# Patient Record
Sex: FEMALE | Race: WHITE | URBAN - METROPOLITAN AREA
[De-identification: names, ages, dates, MRNs, and addresses within clinical notes are randomized per-mention and may not be internally consistent; named-entity substitution may affect disease eponyms.]

---

## 2024-01-09 RX ORDER — SODIUM CHLORIDE, SODIUM LACTATE, POTASSIUM CHLORIDE, CALCIUM CHLORIDE 600; 310; 30; 20 MG/100ML; MG/100ML; MG/100ML; MG/100ML
INJECTION, SOLUTION INTRAVENOUS CONTINUOUS
Status: CANCELLED | OUTPATIENT
Start: 2024-01-09

## 2024-01-10 ENCOUNTER — ANESTHESIA (OUTPATIENT)
Dept: ENDOSCOPY | Facility: HOSPITAL | Age: 44
End: 2024-01-10
Payer: COMMERCIAL

## 2024-01-10 ENCOUNTER — ANESTHESIA EVENT (OUTPATIENT)
Dept: ENDOSCOPY | Facility: HOSPITAL | Age: 44
End: 2024-01-10
Payer: COMMERCIAL

## 2024-01-10 ENCOUNTER — HOSPITAL ENCOUNTER (OUTPATIENT)
Facility: HOSPITAL | Age: 44
Setting detail: HOSPITAL OUTPATIENT SURGERY
Discharge: HOME | End: 2024-01-10
Attending: INTERNAL MEDICINE | Admitting: INTERNAL MEDICINE
Payer: COMMERCIAL

## 2024-01-10 ENCOUNTER — APPOINTMENT (OUTPATIENT)
Dept: GENERAL RADIOLOGY | Facility: HOSPITAL | Age: 44
End: 2024-01-10
Attending: INTERNAL MEDICINE
Payer: COMMERCIAL

## 2024-01-10 VITALS
BODY MASS INDEX: 29.25 KG/M2 | TEMPERATURE: 97 F | WEIGHT: 149 LBS | DIASTOLIC BLOOD PRESSURE: 69 MMHG | HEART RATE: 66 BPM | RESPIRATION RATE: 16 BRPM | SYSTOLIC BLOOD PRESSURE: 120 MMHG | OXYGEN SATURATION: 100 % | HEIGHT: 60 IN

## 2024-01-10 DIAGNOSIS — K86.89 PANCREATIC MASS: ICD-10-CM

## 2024-01-10 DIAGNOSIS — R79.89 ABNORMAL LFTS: ICD-10-CM

## 2024-01-10 LAB — B-HCG UR QL: NEGATIVE

## 2024-01-10 PROCEDURE — 88307 TISSUE EXAM BY PATHOLOGIST: CPT | Performed by: INTERNAL MEDICINE

## 2024-01-10 PROCEDURE — 0F798DZ DILATION OF COMMON BILE DUCT WITH INTRALUMINAL DEVICE, VIA NATURAL OR ARTIFICIAL OPENING ENDOSCOPIC: ICD-10-PCS | Performed by: INTERNAL MEDICINE

## 2024-01-10 PROCEDURE — 88341 IMHCHEM/IMCYTCHM EA ADD ANTB: CPT | Performed by: INTERNAL MEDICINE

## 2024-01-10 PROCEDURE — 07DC8ZX EXTRACTION OF PELVIS LYMPHATIC, VIA NATURAL OR ARTIFICIAL OPENING ENDOSCOPIC, DIAGNOSTIC: ICD-10-PCS | Performed by: INTERNAL MEDICINE

## 2024-01-10 PROCEDURE — 88342 IMHCHEM/IMCYTCHM 1ST ANTB: CPT | Performed by: INTERNAL MEDICINE

## 2024-01-10 PROCEDURE — 81025 URINE PREGNANCY TEST: CPT

## 2024-01-10 PROCEDURE — 74328 X-RAY BILE DUCT ENDOSCOPY: CPT | Performed by: INTERNAL MEDICINE

## 2024-01-10 PROCEDURE — 88173 CYTOPATH EVAL FNA REPORT: CPT | Performed by: INTERNAL MEDICINE

## 2024-01-10 PROCEDURE — 0FBG8ZX EXCISION OF PANCREAS, VIA NATURAL OR ARTIFICIAL OPENING ENDOSCOPIC, DIAGNOSTIC: ICD-10-PCS | Performed by: INTERNAL MEDICINE

## 2024-01-10 PROCEDURE — 88305 TISSUE EXAM BY PATHOLOGIST: CPT | Performed by: INTERNAL MEDICINE

## 2024-01-10 PROCEDURE — BW41ZZZ ULTRASONOGRAPHY OF ABDOMEN AND PELVIS: ICD-10-PCS | Performed by: INTERNAL MEDICINE

## 2024-01-10 PROCEDURE — BF47ZZZ ULTRASONOGRAPHY OF PANCREAS: ICD-10-PCS | Performed by: INTERNAL MEDICINE

## 2024-01-10 DEVICE — STENT SYSTEM RMV
Type: IMPLANTABLE DEVICE | Site: BILIARY | Status: FUNCTIONAL
Brand: WALLFLEX BILIARY

## 2024-01-10 RX ORDER — SODIUM CHLORIDE, SODIUM LACTATE, POTASSIUM CHLORIDE, CALCIUM CHLORIDE 600; 310; 30; 20 MG/100ML; MG/100ML; MG/100ML; MG/100ML
INJECTION, SOLUTION INTRAVENOUS CONTINUOUS
Status: DISCONTINUED | OUTPATIENT
Start: 2024-01-10 | End: 2024-01-10

## 2024-01-10 RX ORDER — HYDROMORPHONE HYDROCHLORIDE 1 MG/ML
INJECTION, SOLUTION INTRAMUSCULAR; INTRAVENOUS; SUBCUTANEOUS
Status: DISCONTINUED
Start: 2024-01-10 | End: 2024-01-10

## 2024-01-10 RX ORDER — INDOMETHACIN 100 MG
100 SUPPOSITORY, RECTAL RECTAL ONCE
Status: COMPLETED | OUTPATIENT
Start: 2024-01-10 | End: 2024-01-10

## 2024-01-10 RX ORDER — HYDROMORPHONE HYDROCHLORIDE 1 MG/ML
0.5 INJECTION, SOLUTION INTRAMUSCULAR; INTRAVENOUS; SUBCUTANEOUS ONCE
Status: COMPLETED | OUTPATIENT
Start: 2024-01-10 | End: 2024-01-10

## 2024-01-10 RX ORDER — NALOXONE HYDROCHLORIDE 0.4 MG/ML
0.08 INJECTION, SOLUTION INTRAMUSCULAR; INTRAVENOUS; SUBCUTANEOUS ONCE AS NEEDED
Status: DISCONTINUED | OUTPATIENT
Start: 2024-01-10 | End: 2024-01-10

## 2024-01-10 RX ORDER — LIDOCAINE HYDROCHLORIDE 10 MG/ML
INJECTION, SOLUTION EPIDURAL; INFILTRATION; INTRACAUDAL; PERINEURAL AS NEEDED
Status: DISCONTINUED | OUTPATIENT
Start: 2024-01-10 | End: 2024-01-10 | Stop reason: SURG

## 2024-01-10 RX ORDER — ONDANSETRON 2 MG/ML
4 INJECTION INTRAMUSCULAR; INTRAVENOUS ONCE AS NEEDED
Status: DISCONTINUED | OUTPATIENT
Start: 2024-01-10 | End: 2024-01-10

## 2024-01-10 RX ORDER — INDOMETHACIN 100 MG
SUPPOSITORY, RECTAL RECTAL
Status: DISCONTINUED
Start: 2024-01-10 | End: 2024-01-10

## 2024-01-10 RX ORDER — SODIUM CHLORIDE, SODIUM LACTATE, POTASSIUM CHLORIDE, CALCIUM CHLORIDE 600; 310; 30; 20 MG/100ML; MG/100ML; MG/100ML; MG/100ML
100 INJECTION, SOLUTION INTRAVENOUS CONTINUOUS
Status: DISCONTINUED | OUTPATIENT
Start: 2024-01-10 | End: 2024-01-10

## 2024-01-10 RX ADMIN — SODIUM CHLORIDE, SODIUM LACTATE, POTASSIUM CHLORIDE, CALCIUM CHLORIDE: 600; 310; 30; 20 INJECTION, SOLUTION INTRAVENOUS at 07:44:00

## 2024-01-10 RX ADMIN — LIDOCAINE HYDROCHLORIDE 30 MG: 10 INJECTION, SOLUTION EPIDURAL; INFILTRATION; INTRACAUDAL; PERINEURAL at 07:49:00

## 2024-01-10 RX ADMIN — SODIUM CHLORIDE, SODIUM LACTATE, POTASSIUM CHLORIDE, CALCIUM CHLORIDE: 600; 310; 30; 20 INJECTION, SOLUTION INTRAVENOUS at 08:35:00

## 2024-01-10 NOTE — OPERATIVE REPORT
Lluvia Nieves Patient Status:  Hospital Outpatient Surgery    1980 MRN [id number]   [facility] Attending Diego Grady MD   Date 1/10/2024 PCP PHYSICIAN NONSTAFF     PREOPERATIVE DIAGNOSIS/INDICATION: Malignant biliary obstruction  POSTOPERTATIVE DIAGNOSIS: Same  PROCEDURE PERFORMED: ERCP with biliary sphincterotomy and biliary stent placement  TIME OUT WAS PERFORMED    SEDATION: MAC sedation provided by General Anesthesia    INFORMED CONSENT: Risks, benefits and alternatives to the procedure were explained to the patient including but not limited to bleeding, infection, perforation, adverse drug reactions, pancreatitis and the need for hospitalization and surgery if this occurs, the patient understands and agrees to procedure.  PROCEDURE DESCRIPTION: The therapeutic side viewing duodenoscope was introduced into the patient’s mouth, hypo pharynx, esophagus, stomach and the first and second portion of the duodenum, straightening of the endoscope was performed to obtain a direct view of the major ampulla. Careful but limited examination of the above described areas was performed on withdrawal of the endoscope. The patient tolerated the procedure well and there were no immediate complications noted during the procedure, the patient was transported to the recovery area in stable condition.  FINDINGS:  DUODENUM: Normal  MAJOR AMPULLA: normal  ERP: Limited injection showed collaterals and obstruction of PD at the head  ERC: The CBD showed a 3 cm tight malignant stricture with upstream dilatation of the CHD, the confluence of the right and left hepatic ducts and the intrahepatics, the cystic duct and the gallbladder were not visualized  THERAPEUTICS: The CBD was cannulated using a standard 0.035 [place] scientific  Hydratome RX44, 20mm cut wire, deep cannulation was performed with the help of a 0.035 straight Hydra jagwire 260cm in length, an adequate biliary sphincterotomy  was performed with standard ERBE settings, there were no immediate complication noted. We placed a biliary fully covered metal stent size 10 mm x 80 mm successfully; at the end of the procedure the proximal end was located at the CHD and the distal end in the duodenum.  RECOMMENDATIONS:   Post ERCP precautions  Call status report post procedure.  Surgical and medical oncology consultation    Diego Grady MD  1/10/2024  8:33 AM

## 2024-01-10 NOTE — H&P
[facility]  Pre-op H and P    Lluvia Nieves Patient Status:  Hospital Outpatient Surgery    1980 MRN [id number]   Location [facility] ENDOSCOPY [facility]IN [facility]ENTER Attending No att. providers found   Date 1/10/2024 PCP PHYSICIAN NONSTAFF     CC: Pancreas mass    History of Present Illness:  Lluvia Nieves is a a(n) 43 year old female. Pancreas mass    History:  Past Medical History:   Diagnosis Date    Abdominal pain Couple days ago    Blood in the stool Couple months ago    Easy bruising Couple months ago    Flatulence/gas pain/belching Couple days ago    Headache disorder Couple months ago    Heartburn 2 months ago    Heavy menses Couple months ago    Indigestion 2 months ago    Itch of skin Couple months ago    Nausea Couple of days ago    Stress Couple months ago    Uncomfortable fullness after meals Couple days ago    Vomiting Couple days ago    Weight loss Couple months ago     Past Surgical History:   Procedure Laterality Date    NEEDLE BIOPSY LEFT      TUBAL LIGATION       History reviewed. No pertinent family history.   reports that she has never smoked. She has never used smokeless tobacco. She reports that she does not currently use alcohol. She reports that she does not use drugs.    Allergies:  No Known Allergies    Medications:    Current Facility-Administered Medications:     [COMPLETED] lactated ringers IV bolus 1,000 mL, 1,000 mL, Intravenous, Once **FOLLOWED BY** lactated ringers infusion, 100 mL/hr, Intravenous, Continuous    indomethacin (Indocin) 100 MG rectal suppository, , ,     lactated ringers infusion, , Intravenous, Continuous    naloxone (Narcan) 0.4 MG/ML injection 0.08 mg, 0.08 mg, Intravenous, Once PRN    ondansetron (Zofran) 4 MG/2ML injection 4 mg, 4 mg, Intravenous, Once PRN    iohexol (OMNIPAQUE) 350 MG/ML injection, , , PRN    HYDROmorphone (Dilaudid) 1 MG/ML injection, , ,     Physical Exam:    Blood pressure 120/69, pulse 66, temperature 97.2 °F (36.2 °C), temperature  source Temporal, resp. rate 16, height 5' (1.524 m), weight 149 lb (67.6 kg), last menstrual period 12/22/2023, SpO2 100%.    General: Appears alert, oriented x3 and in no acute distress.  CV: Normal rate   Lungs: Normal effort   Skin: Warm and dry.  Laboratory Data:         Assessment/Plan/Procedure:  There is no problem list on file for this patient.      Pancreas mass    Plan:  EUSERCP    Diego Grady MD  1/10/2024  10:15 AM

## 2024-01-10 NOTE — ANESTHESIA PREPROCEDURE EVALUATION
PRE-OP EVALUATION    Patient Name: Lluvia Nieves    Admit Diagnosis: Pancreatic mass [K86.89]  Abnormal LFTs [R79.89]    Pre-op Diagnosis: Pancreatic mass [K86.89]  Abnormal LFTs [R79.89]    ENDOSCOPIC RETROGRADE CHOLANGIOPANCREATOGRAPHY (ERCP) and endoscopic ultrasound    Anesthesia Procedure: ENDOSCOPIC RETROGRADE CHOLANGIOPANCREATOGRAPHY (ERCP) and endoscopic ultrasound  ENDOSCOPIC ULTRASOUND (EUS)    Surgeon(s) and Role:     * Diego Grady MD - Primary    Pre-op vitals reviewed.  Temp: 97.2 °F (36.2 °C)  Pulse: 71  Resp: 18  BP: 111/68  SpO2: 99 %  Body mass index is 29.1 kg/m².    Current medications reviewed.  Hospital Medications:  • lactated ringers IV bolus 1,000 mL  1,000 mL Intravenous Once    Followed by   • lactated ringers infusion  100 mL/hr Intravenous Continuous   • indomethacin (Indocin) 100 MG rectal suppository 100 mg  100 mg Rectal Once   • indomethacin (Indocin) 100 MG rectal suppository           Outpatient Medications:     Medications Prior to Admission   Medication Sig Dispense Refill Last Dose   • Multiple Vitamin (MULTI-VITAMIN) Oral Tab Take 1 tablet by mouth daily.   2 weeks ago       Allergies: Patient has no known allergies.      Anesthesia Evaluation    Patient summary reviewed.    Anesthetic Complications  (-) history of anesthetic complications         GI/Hepatic/Renal  Comment: Pancreatic head mass                               Cardiovascular        Exercise tolerance: good     MET: >4                                           Endo/Other                                  Pulmonary                           Neuro/Psych                              Heartburn  Indigestion  Abdominal pain  Nausea  Vomiting  Uncomfortable fullness after meals  Flatulence/gas pain/belching  Blood in the stool  Weight loss  Headache disorder  Heavy menses  Stress  Itch of skin  Easy bruising          Past Surgical History:   Procedure Laterality Date   • NEEDLE BIOPSY LEFT     • TUBAL LIGATION        Social History     Socioeconomic History   • Marital status:    Tobacco Use   • Smoking status: Never   • Smokeless tobacco: Never   Vaping Use   • Vaping Use: Never used   Substance and Sexual Activity   • Alcohol use: Not Currently   • Drug use: Never     History   Drug Use Unknown     Available pre-op labs reviewed.               Airway      Mallampati: II  Mouth opening: >3 FB  TM distance: 4 - 6 cm  Neck ROM: full Cardiovascular      Rhythm: regular  Rate: normal     Dental    Dentition appears grossly intact         Pulmonary      Breath sounds clear to auscultation bilaterally.               Other findings        ASA: 2   Plan: MAC  NPO status verified and patient meets guidelines.    Post-procedure pain management plan discussed with surgeon and patient.      Plan/risks discussed with: patient            Present on Admission:  **None**

## 2024-01-10 NOTE — OPERATIVE REPORT
Lluvia Nieves Patient Status:  Hospital Outpatient Surgery    1980 MRN [id number]   [facility] Attending Diego Grady MD   Date 1/10/2024 PCP PHYSICIAN NONSTAFF     PREOPERATIVE DIAGNOSIS/INDICATION: Pancreas mass, abnormal LFT's  POSTOPERTATIVE DIAGNOSIS: Same  PROCEDURE PERFORMED: EUS FNB  TIME OUT WAS PERFORMED    SEDATION: MAC sedation provided by General Anesthesia    INFORMED CONSENT: Risks, benefits and alternatives to the procedure were explained to the patient including but not limited to bleeding, infection, perforation, adverse drug reactions, pancreatitis and the need for hospitalization and surgery if this occurs, the patient understands and agrees to procedure.  PROCEDURE DESCRIPTION: The therapeutic side viewing echoendoscope was introduced into the patient’s mouth, hypo pharynx, esophagus, stomach and the first and second portion of the duodenum, straightening of the endoscope was performed to obtain a direct view of the major ampulla. Careful but limited examination of the above described areas was performed on withdrawal of the endoscope. The patient tolerated the procedure well and there were no immediate complications noted during the procedure, the patient was transported to the recovery area in stable condition.  FINDINGS:  DUODENUM: Normal  MAJOR AMPULLA: Normal  ECHO: Imaging was performed through the esophagus, stomach and duodenum. The visualized portions of the left hepatic lobe showed intrahepatic biliary dilatation, the visualized pancreatic parenchyma showed a 3.3 cm hypoechoic heterogeneous mass at the head causing obstruction of the CBD and portal vein confluence, there was a large 3 cm periportal lymph node, round hypoechoic  THERAPEUTICS: FNB 22 G Acquire [place] [employer] needle, 4 passes, multiple throws of the pancreas head mass though duodenum and 3 passes, multiple throws of periportal LN through duodenum  RECOMMENDATIONS:    Post EUS/FNAprecautions, watch for bleeding, infection, perforation, adverse drug reactions and pancreatitis.  Call status report post procedure.  Follow cytopathology results.    Diego Grady MD  1/10/2024  8:35 AM

## 2024-01-10 NOTE — DISCHARGE INSTRUCTIONS
Home Care Instructions for Gastroscopy with Sedation    Diet:  - Resume your regular diet as tolerated unless otherwise instructed.  - Start with light meals to minimize bloating.  - Do not drink alcohol today.    Medication:  - If you have questions about resuming your normal medications, please contact your Primary Care Physician.    Activities:  - Take it easy today. Do not return to work today.  - Do not drive today.  - Do not operate any machinery today (including kitchen equipment).    Gastroscopy:  - You may have a sore throat for 2-3 days following the exam. This is normal. Gargling with warm salt water (1/2 tsp salt to 1 glass warm water) or using throat lozenges will help.  - If you experience any sharp pain in your neck, abdomen or chest, vomiting of blood, oral temperature over 100 degrees Fahrenheit, light-headedness or dizziness, or any other problems, contact your doctor.    **If unable to reach your doctor, please go to the [facility] Emergency Room**    - Your referring physician will receive a full report of your examination.  - If you do not hear from your doctor's office within two weeks of your biopsy, please call them for your results.    You may be able to see your laboratory results in [email or web address] between 4 and 7 business days.  In some cases, your physician may not have viewed the results before they are released to [email or web address].  If you have questions regarding your results contact the physician who ordered the test/exam by phone or via [email or web address] by choosing \"Ask a Medical Question.\"

## 2024-01-10 NOTE — ANESTHESIA POSTPROCEDURE EVALUATION
[facility]    Lluvia Nieves Patient Status:  Hospital Outpatient Surgery   Age/Gender 43 year old female MRN [id number]   Location [facility] ENDOSCOPY PAIN CENTER Attending Diego Grady MD   Hosp Day # 0 PCP PHYSICIAN NONSTAFF       Anesthesia Post-op Note    ENDOSCOPIC RETROGRADE CHOLANGIOPANCREATOGRAPHY (ERCP) with sphincterotomy and endoscopic ultrasound with fine needle biopsy    Procedure Summary       Date: 01/10/24 Room / Location:  ENDOSCOPY 02 /  ENDOSCOPY    Anesthesia Start: 0744 Anesthesia Stop: 0835    Procedures:       ENDOSCOPIC RETROGRADE CHOLANGIOPANCREATOGRAPHY (ERCP) with sphincterotomy and endoscopic ultrasound with fine needle biopsy      ENDOSCOPIC ULTRASOUND (EUS) Diagnosis:       Pancreatic mass      Abnormal LFTs      (pancreatic malignancy)    Surgeons: Diego Grady MD Anesthesiologist: Robbi Alonzo MD    Anesthesia Type: MAC ASA Status: 2            Anesthesia Type: MAC    Vitals Value Taken Time   /74 01/10/24 0835   Temp  01/10/24 0835   Pulse 84 01/10/24 0835   Resp 16 01/10/24 0835   SpO2 100 % 01/10/24 0835   Vitals shown include unfiled device data.    Patient Location: Endoscopy    Anesthesia Type: MAC    Airway Patency: patent    Postop Pain Control: adequate    Mental Status: mildly sedated but able to meaningfully participate in the post-anesthesia evaluation    Nausea/Vomiting: none    Cardiopulmonary/Hydration status: stable euvolemic    Complications: no apparent anesthesia related complications    Postop vital signs: stable    Dental Exam: Unchanged from Postop

## 2024-01-17 ENCOUNTER — OFFICE VISIT (OUTPATIENT)
Dept: SURGERY | Facility: CLINIC | Age: 44
End: 2024-01-17
Payer: COMMERCIAL

## 2024-01-17 VITALS
DIASTOLIC BLOOD PRESSURE: 83 MMHG | RESPIRATION RATE: 20 BRPM | SYSTOLIC BLOOD PRESSURE: 122 MMHG | HEART RATE: 75 BPM | WEIGHT: 150.63 LBS | HEIGHT: 60 IN | BODY MASS INDEX: 29.57 KG/M2 | TEMPERATURE: 98 F

## 2024-01-17 DIAGNOSIS — C7A.8 NEUROENDOCRINE CARCINOMA METASTATIC TO INTRA-ABDOMINAL LYMPH NODE (HCC): ICD-10-CM

## 2024-01-17 DIAGNOSIS — D3A.8 PRIMARY NEUROENDOCRINE TUMOR OF PANCREAS: Primary | ICD-10-CM

## 2024-01-17 DIAGNOSIS — C7B.8 NEUROENDOCRINE CARCINOMA METASTATIC TO INTRA-ABDOMINAL LYMPH NODE (HCC): ICD-10-CM

## 2024-01-17 DIAGNOSIS — C7B.8 METASTATIC MALIGNANT NEUROENDOCRINE TUMOR TO LIVER (HCC): ICD-10-CM

## 2024-01-17 PROBLEM — K86.89 MASS OF HEAD OF PANCREAS: Status: ACTIVE | Noted: 2024-01-17

## 2024-01-17 PROBLEM — K86.89 MASS OF HEAD OF PANCREAS: Status: RESOLVED | Noted: 2024-01-17 | Resolved: 2024-01-17

## 2024-01-17 PROBLEM — K86.89 MASS OF HEAD OF PANCREAS (HCC): Status: RESOLVED | Noted: 2024-01-17 | Resolved: 2024-01-17

## 2024-01-17 PROBLEM — K21.9 GERD (GASTROESOPHAGEAL REFLUX DISEASE): Status: ACTIVE | Noted: 2024-01-17

## 2024-01-17 PROBLEM — K86.89 MASS OF HEAD OF PANCREAS (HCC): Status: ACTIVE | Noted: 2024-01-17

## 2024-01-17 PROCEDURE — 3008F BODY MASS INDEX DOCD: CPT | Performed by: SURGERY

## 2024-01-17 PROCEDURE — 99205 OFFICE O/P NEW HI 60 MIN: CPT | Performed by: SURGERY

## 2024-01-17 PROCEDURE — 3074F SYST BP LT 130 MM HG: CPT | Performed by: SURGERY

## 2024-01-17 PROCEDURE — 3079F DIAST BP 80-89 MM HG: CPT | Performed by: SURGERY

## 2024-01-17 NOTE — CONSULTS
[facility]        Patient Name:  Lluvia Nieves   YOB: 1980   Gender:  Female   Appt Date:  1/17/2024   Provider:  Jarrod Napier MD     PATIENT PROVIDERS  Referring Provider: Diego Grady MD    Primary Care Provider: Sundeep Ordonez MD       CHIEF COMPLAINT  Chief Complaint   Patient presents with    Consult     Pancreas mass         PROBLEMS  Reviewed   Patient Active Problem List   Diagnosis    GERD (gastroesophageal reflux disease)    Primary neuroendocrine tumor of pancreas        History of Present Illness:  Patient is a 43 year old woman who is currently being referred for consideration of surgical oncology management of recently diagnosed neuroendocrine tumor of the pancreas     12/2023: Patient saw PCP who recommended colonoscopy and routine lab work. LFTs elevated: Total Bilirubin 2.6, AST//294,     12/29/2023: US liver performed showing 4.6 hypoechoic stricture in the region of the pancreatic head, concerning for malignancy or malignant lymphadenopathy. Noted intrahepatic and extrahepatic biliary ductal dilatation.    1/6/2024: CT A/P showing 3.3 x 2.8 x 3.7 cm hypoenhancing mass in the region of the pancreatic head suspicious for primary pancreatic cancer. Mass involves the portal vein and superior mesenteric vein with > 180 degree involvement, and abuts the common hepatic artery. Enlarged peripancreatic lymph nodes. 1.8 x 1.8 cm hypoenhancing mass in right hepatic lobe suspicious for metastatic disease.     1/10/2024: ERCP with EUS/FNA performed by Dr Grady showing 3 cm tight malignant stricture of the CBD with upstream dilatation of the CHD. EUS with 3.3 cm hypoechoic mass at head of pancreas causing obstruction. Biopsies of pancreatic mass and periportal lymph nodes --> well differentiated NET of pancreatic head, metastatic NET in periportal lymph nodes    Patient is doing well since ERCP. She confirms nausea and vomiting for the last year  that is progressively getting worse. She has noticed decreased appetite and confirms weight loss. She has epigastric pain radiating into the back that is associated with when she eats. No history of pancreatitis. No personal history of cancer. Family history includes a cousin with lung cancer. Recently has mammogram which was within normal limits.      Vital Signs:  /83 (BP Location: Right arm, Patient Position: Sitting, Cuff Size: adult)   Pulse 75   Temp 98 °F (36.7 °C) (Temporal)   Resp 20   Ht 1.524 m (5')   Wt 68.3 kg (150 lb 9.6 oz)   LMP 12/22/2023   BMI 29.41 kg/m²      Medications Reviewed:    Current Outpatient Medications:     Multiple Vitamin (MULTI-VITAMIN) Oral Tab, Take 1 tablet by mouth daily., Disp: , Rfl:      Allergies Reviewed:  No Known Allergies     History:  Reviewed:  Past Medical History:   Diagnosis Date    Abdominal pain Couple days ago    Blood in the stool Couple months ago    Easy bruising Couple months ago    Flatulence/gas pain/belching Couple days ago    Headache disorder Couple months ago    Heartburn 2 months ago    Heavy menses Couple months ago    Indigestion 2 months ago    Itch of skin Couple months ago    Nausea Couple of days ago    Stress Couple months ago    Uncomfortable fullness after meals Couple days ago    Vomiting Couple days ago    Weight loss Couple months ago      Reviewed:  Past Surgical History:   Procedure Laterality Date    Needle biopsy left      Tubal ligation        Reviewed Social History:  Social History     Socioeconomic History    Marital status:    Tobacco Use    Smoking status: Never    Smokeless tobacco: Never   Vaping Use    Vaping Use: Never used   Substance and Sexual Activity    Alcohol use: Not Currently    Drug use: Never      Reviewed:  History reviewed. No pertinent family history.   Review of Systems:  GENERAL HEALTH: feels well, + fatigue.   SKIN: no change in mole.   HEENT: denies pain  RESPIRATORY: denies shortness of  breath, wheezing or cough   CARDIOVASCULAR: denies chest pain, SOB, edema,orthopnea, no palpitations   GI: + nausea, vomiting and epigastric pain. Denies constipation, diarrhea; no rectal bleeding  GENITAL/: no blood in urine  MUSCULOSKELETAL: no joint complaints, no back pain  NEURO: no tingling, numbness, weakness  ENDOCRINE: + weight loss/gain  PSYCH: no mood changes       Physical Examination:  Constitutional: NAD.   Eyes: Sclera: non-icteric.   Lymph Nodes: Lymph Nodes no cervical LAD, supraclavicular LAD, axillary LAD, or inguinal LAD.   Lungs: Auscultation: breath sounds normal.   Cardiovascular: Heart Auscultation: RRR.   Abdomen: Inspection and Palpation: no masses, tenderness (no guarding, no rebound), or CVA tenderness and soft and non-distended. Liver: no hepatomegaly. Spleen: no splenomegaly. Hernia: none palpable.   Musculoskeletal: Extremities: no edema.   Skin: Inspection and palpation: no jaundice.      Document Review:  12/29/2023 US Abdomen:  1.  4.6 cm hypoechoic structure in the region of the pancreatic head, concerning for pancreatic head malignancy or malignant lymphadenopathy. Further evaluation by a contrast enhanced CT of the abdomen and pelvis pancreatic protocol is recommended.   2.  Intrahepatic and extrahepatic biliary ductal dilatation, likely related to the aforementioned process.   3.  Cholelithiasis without acute cholecystitis.     1/6/2024 CT A/P:  1.  A 3.3 x 2.8 x 3.7 cm hypoenhancing mass in the region of the pancreatic head. This is highly suspicious for primary pancreatic cancer. Main pancreatic duct is nondilated. This mass involves the portal vein and the superior mesenteric vein with greater than 180 degree involvement. Mass abuts the common hepatic artery without focal narrowing. No involvement of the main celiac or the superior mesenteric artery.     2.  Enlarged peripancreatic lymph nodes consistent with metastatic disease.   3.  A 1.8 x 1.8 cm hypoenhancing mass in  the right hepatic lobe adjacent to the gallbladder fundus. This is highly suspicious for metastatic disease.     4.  Enlarged left para-aortic lymph node, highly suspicious for metastatic disease.   5.  Echogenic material is noted within the gallbladder. This likely represents gallstones given appearance and ultrasound.     1/10/2024 EUS:  ECHO: Imaging was performed through the esophagus, stomach and duodenum. The visualized portions of the left hepatic lobe showed intrahepatic biliary dilatation, the visualized pancreatic parenchyma showed a 3.3 cm hypoechoic heterogeneous mass at the head causing obstruction of the CBD and portal vein confluence, there was a large 3 cm periportal lymph node, round hypoechoic  THERAPEUTICS: FNB 22 G Acquire [employer] needle, 4 passes, multiple throws of the pancreas head mass though duodenum and 3 passes, multiple throws of periportal LN through duodenum  1/10/2024 ERCP:  ERP: Limited injection showed collaterals and obstruction of PD at the head  ERC: The CBD showed a 3 cm tight malignant stricture with upstream dilatation of the CHD, the confluence of the right and left hepatic ducts and the intrahepatics, the cystic duct and the gallbladder were not visualized  Pathology:  Final Diagnosis:   A.  Pancreatic head mass biopsy:  -Well-differentiated neuroendocrine tumor.     B.  Periportal lymph node biopsy:  -Metastatic well-differentiated neuroendocrine tumor.     Final Diagnosis:   A- Pancreatic head mass, EUS-guided fine needle aspiration:  -Adequate for evaluation.   -Atypical cells present.  -See surgical specimen [id number].     B- Periportal lymph node, EUS-guided fine needle aspiration:  -Adequate for evaluation.   -POSITIVE for metastasis.   -See surgical specimen [id number].        Procedure(s):  None     Assessment / Plan:    ICD-10-CM    1. Primary neuroendocrine tumor of pancreas  D3A.8    -METASTATIC to liver and intra-abdominal lymph nodes.      Findings were  discussed with patient.  A  was present over the language line.  The case was presented today at our multidisciplinary tumor board and my recommendations reflect those of the tumor board.  I recommended PET gallium scan and referral to medical oncology.  Rationale were discussed.  Patient agreed and understood.  She and her family members had ample time to ask questions.         Follow Up:  Pending above.       Electronically Signed by:     Jarrod Napier MD [facility]  Chief of Surgical Oncology, [facility]  Professor of Surgery, [facility]  [phone]

## 2024-01-23 ENCOUNTER — APPOINTMENT (OUTPATIENT)
Dept: HEMATOLOGY/ONCOLOGY | Facility: HOSPITAL | Age: 44
End: 2024-01-23
Attending: INTERNAL MEDICINE
Payer: COMMERCIAL

## 2024-01-24 ENCOUNTER — LAB ENCOUNTER (OUTPATIENT)
Dept: LAB | Age: 44
End: 2024-01-24
Attending: INTERNAL MEDICINE
Payer: COMMERCIAL

## 2024-01-24 DIAGNOSIS — R79.89 ABNORMAL LFTS: ICD-10-CM

## 2024-01-24 LAB
ALBUMIN SERPL-MCNC: 3.5 G/DL (ref 3.4–5)
ALP LIVER SERPL-CCNC: 298 U/L
AST SERPL-CCNC: 47 U/L (ref 15–37)
BILIRUB DIRECT SERPL-MCNC: 0.5 MG/DL (ref 0–0.2)
BILIRUB SERPL-MCNC: 0.7 MG/DL (ref 0.1–2)
PROT SERPL-MCNC: 7.4 G/DL (ref 6.4–8.2)

## 2024-01-24 PROCEDURE — 36415 COLL VENOUS BLD VENIPUNCTURE: CPT

## 2024-01-24 PROCEDURE — 80076 HEPATIC FUNCTION PANEL: CPT

## 2024-01-31 ENCOUNTER — HOSPITAL ENCOUNTER (OUTPATIENT)
Dept: NUCLEAR MEDICINE | Facility: HOSPITAL | Age: 44
Discharge: HOME | End: 2024-01-31
Attending: SURGERY
Payer: COMMERCIAL

## 2024-01-31 DIAGNOSIS — D3A.8 PRIMARY NEUROENDOCRINE TUMOR OF PANCREAS: ICD-10-CM

## 2024-01-31 PROCEDURE — 78815 PET IMAGE W/CT SKULL-THIGH: CPT | Performed by: SURGERY

## 2024-02-06 ENCOUNTER — OFFICE VISIT (OUTPATIENT)
Dept: HEMATOLOGY/ONCOLOGY | Facility: HOSPITAL | Age: 44
End: 2024-02-06
Attending: INTERNAL MEDICINE
Payer: COMMERCIAL

## 2024-02-06 VITALS
HEIGHT: 60 IN | HEART RATE: 76 BPM | DIASTOLIC BLOOD PRESSURE: 71 MMHG | BODY MASS INDEX: 29.64 KG/M2 | SYSTOLIC BLOOD PRESSURE: 111 MMHG | WEIGHT: 151 LBS | OXYGEN SATURATION: 100 % | TEMPERATURE: 98 F | RESPIRATION RATE: 16 BRPM

## 2024-02-06 DIAGNOSIS — C7A.8 PRIMARY MALIGNANT NEUROENDOCRINE TUMOR OF PANCREAS (HCC): Primary | ICD-10-CM

## 2024-02-06 DIAGNOSIS — D50.0 IRON DEFICIENCY ANEMIA DUE TO CHRONIC BLOOD LOSS: ICD-10-CM

## 2024-02-06 DIAGNOSIS — D64.9 ANEMIA, UNSPECIFIED TYPE: ICD-10-CM

## 2024-02-06 LAB
ALBUMIN SERPL-MCNC: 3.4 G/DL (ref 3.4–5)
ALBUMIN/GLOB SERPL: 0.9 {RATIO} (ref 1–2)
ALP LIVER SERPL-CCNC: 271 U/L
ALT SERPL-CCNC: 72 U/L
ANION GAP SERPL CALC-SCNC: 2 MMOL/L (ref 0–18)
AST SERPL-CCNC: 63 U/L (ref 15–37)
BASOPHILS # BLD AUTO: 0.03 X10(3) UL (ref 0–0.2)
BASOPHILS NFR BLD AUTO: 0.4 %
BILIRUB SERPL-MCNC: 0.5 MG/DL (ref 0.1–2)
BUN BLD-MCNC: 15 MG/DL (ref 9–23)
CALCIUM BLD-MCNC: 8.5 MG/DL (ref 8.5–10.1)
CHLORIDE SERPL-SCNC: 114 MMOL/L (ref 98–112)
CO2 SERPL-SCNC: 26 MMOL/L (ref 21–32)
CREAT BLD-MCNC: 0.68 MG/DL
DEPRECATED HBV CORE AB SER IA-ACNC: 5.7 NG/ML
EGFRCR SERPLBLD CKD-EPI 2021: 109 ML/MIN/1.73M2 (ref 60–?)
EOSINOPHIL # BLD AUTO: 0.37 X10(3) UL (ref 0–0.7)
EOSINOPHIL NFR BLD AUTO: 5.4 %
ERYTHROCYTE [DISTWIDTH] IN BLOOD BY AUTOMATED COUNT: 13.4 %
GLOBULIN PLAS-MCNC: 3.9 G/DL (ref 2.8–4.4)
GLUCOSE BLD-MCNC: 157 MG/DL (ref 70–99)
HCT VFR BLD AUTO: 33.6 %
HGB BLD-MCNC: 11 G/DL
IMM GRANULOCYTES # BLD AUTO: 0.02 X10(3) UL (ref 0–1)
IMM GRANULOCYTES NFR BLD: 0.3 %
IRON SATN MFR SERPL: 7 %
IRON SERPL-MCNC: 29 UG/DL
LYMPHOCYTES # BLD AUTO: 0.95 X10(3) UL (ref 1–4)
LYMPHOCYTES NFR BLD AUTO: 14 %
MCH RBC QN AUTO: 28.5 PG (ref 26–34)
MCHC RBC AUTO-ENTMCNC: 32.7 G/DL (ref 31–37)
MCV RBC AUTO: 87 FL
MONOCYTES # BLD AUTO: 0.51 X10(3) UL (ref 0.1–1)
MONOCYTES NFR BLD AUTO: 7.5 %
NEUTROPHILS # BLD AUTO: 4.91 X10 (3) UL (ref 1.5–7.7)
NEUTROPHILS # BLD AUTO: 4.91 X10(3) UL (ref 1.5–7.7)
NEUTROPHILS NFR BLD AUTO: 72.4 %
OSMOLALITY SERPL CALC.SUM OF ELEC: 298 MOSM/KG (ref 275–295)
PLATELET # BLD AUTO: 199 10(3)UL (ref 150–450)
POTASSIUM SERPL-SCNC: 4.2 MMOL/L (ref 3.5–5.1)
PROT SERPL-MCNC: 7.3 G/DL (ref 6.4–8.2)
RBC # BLD AUTO: 3.86 X10(6)UL
SODIUM SERPL-SCNC: 142 MMOL/L (ref 136–145)
TIBC SERPL-MCNC: 443 UG/DL (ref 240–450)
TRANSFERRIN SERPL-MCNC: 297 MG/DL (ref 200–360)
WBC # BLD AUTO: 6.8 X10(3) UL (ref 4–11)

## 2024-02-06 PROCEDURE — 99205 OFFICE O/P NEW HI 60 MIN: CPT | Performed by: INTERNAL MEDICINE

## 2024-02-06 RX ORDER — LANREOTIDE ACETATE 120 MG/.5ML
120 INJECTION SUBCUTANEOUS ONCE
OUTPATIENT
Start: 2024-02-06

## 2024-02-06 NOTE — PROGRESS NOTES
Here for new consult. Accompanied with . She is feeling well overall. Has some abdominal cramping but also has her period currently, so it could be associated with that. She occasionally gets a pain in her left side under her ribs that can radiate across her abdomen. Appetite is good. No family hx of cancer.    Education Record    Learner:  Patient and Spouse    Disease / Diagnosis: Neuroendocrine CA    Barriers / Limitations:  None   Comments:    Method:  Discussion   Comments:    General Topics:  Medication and Plan of care reviewed   Comments:    Outcome:  Shows understanding   Comments:

## 2024-02-06 NOTE — PROGRESS NOTES
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  - never smoker, does not drink  - no FH of cancer    Past Medical History:  Past Medical History:   Diagnosis Date    Abdominal pain Couple days ago    Blood in the stool Couple months ago    Easy bruising Couple months ago    Flatulence/gas pain/belching Couple days ago    Headache disorder Couple months ago    Heartburn 2 months ago    Heavy menses Couple months ago    Indigestion 2  months ago    Itch of skin Couple months ago    Nausea Couple of days ago    Stress Couple months ago    Uncomfortable fullness after meals Couple days ago    Vomiting Couple days ago    Weight loss Couple months ago       Past Surgical History:   Procedure Laterality Date    NEEDLE BIOPSY LEFT      TUBAL LIGATION         Home Medications:  No outpatient medications have been marked as taking for the 2/6/24 encounter (Office Visit) with Eduar Alfred MD.     -------  No current outpatient medications on file prior to visit.     Current Facility-Administered Medications on File Prior to Visit   Medication Dose Route Frequency Provider Last Rate Last Admin    [COMPLETED] lactated ringers IV bolus 1,000 mL  1,000 mL Intravenous Once Diego Grady MD 2,000 mL/hr at 01/10/24 0707 1,000 mL at 01/10/24 0707    [COMPLETED] indomethacin (Indocin) 100 MG rectal suppository 100 mg  100 mg Rectal Once Diego Grady MD   100 mg at 01/10/24 0821    [COMPLETED] HYDROmorphone (Dilaudid) 1 MG/ML injection 0.5 mg  0.5 mg Intravenous Once Diego Grady MD   0.5 mg at 01/10/24 0850       Allergies:   No Known Allergies    Psychosocial History:  Social History     Social History Narrative    Not on file     Social History     Socioeconomic History    Marital status:    Tobacco Use    Smoking status: Never    Smokeless tobacco: Never   Vaping Use    Vaping Use: Never used   Substance and Sexual Activity    Alcohol use: Not Currently    Drug use: Never       Family Medical History:  History reviewed. No pertinent family history.    Review of Systems:  A 10-point ROS was done with pertinent positives and negative per the HPI    Vital Signs:  Height: 152.4 cm (5') (02/06 1353)  Weight: 68.5 kg (151 lb) (02/06 1353)  BSA (Calculated - sq m): 1.66 sq meters (02/06 1353)  Pulse: 76 (02/06 1353)  BP: 111/71 (02/06 1353)  Temp: 97.7 °F (36.5 °C) (02/06 1353)  Do Not Use - Resp Rate: --  SpO2: 100 % (02/06 1353)    Wt  Readings from Last 6 Encounters:   24 68.5 kg (151 lb)   24 68.3 kg (150 lb 9.6 oz)   01/10/24 67.6 kg (149 lb)   24 67.8 kg (149 lb 6.4 oz)       ECOG PS: 1    Physical Examination:  General: Patient is alert and oriented, not in acute distress  Psych: Mood and affect are appropriate  Eyes: EOMI, PERRL  ENT: Oropharynx is clear, no adenopathy  CV: no LE edema  Respiratory: Non-labored respirations  GI/Abd: Soft, mildly tender at epigastric region  Neurological: Grossly intact   Skin: no rashes or petechiae    Laboratory:  No results found for: \"WBC\", \"HGB\", \"HCT\", \"MCV\", \"MCH\", \"MCHC\", \"RDW\", \"PLT\"  Lab Results   Component Value Date    ALKPHO 298 (H) 2024    AST 47 (H) 2024    ALT  2024      Comment:      Due to  backorder we are temporarily unable to offer hospital-based ALT testing at [facility] [place].   If urgently needed, please order ALT test code [id number].   The new order will need a new venipuncture and will be sent to [facility] for testing.   The expected turnaround time will be within 24 hours.     BILT 0.7 2024    TP 7.4 2024    ALB 3.5 2024     No results found for: \"PTT\", \"PT\", \"INR\"    Imagin/31/24 Dotatate PET:  CONCLUSION:    1. There are findings consistent with diffuse lymph node metastases of the neuroendocrine tumor with left supraclavicular, mediastinal and hilar enlarged lymph nodes demonstrating significant accumulation of radiotracer.   2. There is activity evident in the pancreatic head mass.  This has been seen to better advantage on prior contrast-enhanced CT.   3. Diffuse retroperitoneal and mesenteric lymphadenopathy with associated accumulation of radiotracer consistent with neuroendocrine metastasis.   4. 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   5. No specific liver lesion is detected on this study although this may be obscured by physiologic activity in the liver.        Impression & Plan:     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, grade 2  - metastatic to liver and diffuse lymphadenopathy  - s/p biliary stent placement 1/10/24  - check baseline chromogranin A  - discussed incurable prognosis and palliative intent of treatment  - recommended initiating 1L treatment with lanreotide [id number], discussed side effects including but not limited to fatigue, diarrhea, but otherwise is generally a very tolerable medication  - we discussed future therapies for her NET such as Lutathera and cape/tem  - plan for restaging dotatate PET in 6 months (~8/2024)    Iron deficiency anemia  - iron studies consistent with BUD, likely secondary to menorrhagia. Pt currently having her period  - schedule 1000mg IV iron dextran, close monitoring in infusion, recheck iron labs 3 months afterwards    Follow up: C1 lanreotide    Eduar Alfred MD  Hematology/Medical Oncology  [facility]

## 2024-02-07 ENCOUNTER — DOCUMENTATION ONLY (OUTPATIENT)
Dept: HEMATOLOGY/ONCOLOGY | Facility: HOSPITAL | Age: 44
End: 2024-02-07

## 2024-02-07 LAB — CHROMOGRANIN A: 229 NG/ML

## 2024-02-07 NOTE — PROGRESS NOTES
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  Chart reviewed. Noted pt w/ 60 lb unplanned wt loss surrounding dx. RD will plan to meet w/ during upcoming appointment.

## 2024-02-14 ENCOUNTER — OFFICE VISIT (OUTPATIENT)
Dept: HEMATOLOGY/ONCOLOGY | Facility: HOSPITAL | Age: 44
End: 2024-02-14
Attending: INTERNAL MEDICINE
Payer: COMMERCIAL

## 2024-02-14 VITALS
HEART RATE: 77 BPM | SYSTOLIC BLOOD PRESSURE: 99 MMHG | DIASTOLIC BLOOD PRESSURE: 66 MMHG | TEMPERATURE: 99 F | RESPIRATION RATE: 16 BRPM | OXYGEN SATURATION: 95 %

## 2024-02-14 DIAGNOSIS — C7A.8 PRIMARY MALIGNANT NEUROENDOCRINE TUMOR OF PANCREAS (HCC): ICD-10-CM

## 2024-02-14 DIAGNOSIS — D50.0 IRON DEFICIENCY ANEMIA DUE TO CHRONIC BLOOD LOSS: Primary | ICD-10-CM

## 2024-02-14 PROCEDURE — 96365 THER/PROPH/DIAG IV INF INIT: CPT

## 2024-02-14 PROCEDURE — 96376 TX/PRO/DX INJ SAME DRUG ADON: CPT

## 2024-02-14 PROCEDURE — 96372 THER/PROPH/DIAG INJ SC/IM: CPT

## 2024-02-14 RX ORDER — LANREOTIDE ACETATE 120 MG/.5ML
120 INJECTION SUBCUTANEOUS ONCE
Status: COMPLETED | OUTPATIENT
Start: 2024-02-14 | End: 2024-02-14

## 2024-02-14 RX ADMIN — LANREOTIDE ACETATE 120 MG: 120 INJECTION SUBCUTANEOUS at 15:08:00

## 2024-02-14 NOTE — PROGRESS NOTES
Pt here for INFED and Lanreotide injection . Pt denies any issues or concerns.      Ordering MD: Dr. Alfred  Order Exp: Infed is a one-time dose. Lanreotide is a monthly dose until discontinued     Pt tolerated infusion without difficulty or complaint. Reviewed next apt date/time: Yes - next appt will be in 4 weeks for Lanreotide injection with a follow up with Dr. Alfred and labs. Patient wants to come in on Fridays for her appointments. Reminded that she needs labs to recheck iron stores in 3 months.      Education Record  Learner:  Patient and Spouse  Disease / Diagnosis: Primary malignant neuroendocrine tumor of pancreas, Iron deficiency anemia  Barriers / Limitations:  Language  Method:  Brief focused and Reinforcement  General Topics:  Medication, Side effects and symptom management, and Plan of care reviewed  Outcome:  Shows understanding  Tolerated iron infusion without any issues. Vital signs taken and recorded after a 5-minute flush. Patient discharged in good condition.

## 2024-02-15 NOTE — PROGRESS NOTES
Oncology Nutrition Consultation    Patient Name: Lluvia Nieves  YOB: 1980  Medical Record Number: [id number]   Account Number: [id number]  Dietitian: Phyllis Ambriz RD, LDN    Date of visit: 2/14/2024    Diet Rx: high protein/quality as tolerated    Pertinent Dx/PMH: metastatic pancreatic NET    Past Medical History:   Diagnosis Date    Abdominal pain Couple days ago    Blood in the stool Couple months ago    Easy bruising Couple months ago    Flatulence/gas pain/belching Couple days ago    Headache disorder Couple months ago    Heartburn 2 months ago    Heavy menses Couple months ago    Indigestion 2 months ago    Itch of skin Couple months ago    Nausea Couple of days ago    Stress Couple months ago    Uncomfortable fullness after meals Couple days ago    Vomiting Couple days ago    Weight loss Couple months ago       TX: Lanreotide    Other pertinent subjective/objective information: diet/sx/wt hx obtained    Pertinent Meds:  No current outpatient medications on file.    Pertinent Labs:     Height:  5'           IBW: 100  +/- 10%    WT HX:   Wt Readings from Last 9 Encounters:   02/06/24 68.5 kg (151 lb)   01/17/24 68.3 kg (150 lb 9.6 oz)   01/10/24 67.6 kg (149 lb)   01/09/24 67.8 kg (149 lb 6.4 oz)       Estimated Nutrition Needs: 20-22 kcals/kg = 6131-5551 KCALS/d; 1.2 gms protein/kg = 82 gms/d    Services Provided: Verbal ix provided addressing -  importance of nutrition during tx    Assessment/Plan: RD met w/ pt and spouse in tx area during her iron infusion.     Pt reporting a 60 lb wt loss over the past 2 years noting initially intentional by following a Keto Diet but then continuing to lose w/o dieting. Pt noted she had been snacking a lot and dining out frequently at fast food restaurants prior to starting Keto Diet.     Present diet hx revealed \"green smoothie\" made from cucumbers, spinach, celery and a couple of grapes at 0730; 2 eggs, +/- cody/sausage or fish w/ zucchini for  breakfast at 1100; +/- snacking on fruit; balanced dinner. Pt noted drinking H2O throughout the day.     RD reviewed recommendations as noted requesting pt consider increasing protein intake as well as CHO - examples provided. Questions addressed. Both pt and spouse verbalized understanding of recommendations made. RD will continue to monitor.     Thank you for allowing me to participate in the care of Lluvia.    The 21st Century Cures Act makes medical notes like these available to patients in the interest of transparency. Please be advised this is a medical document. Medical documents are intended to carry relevant information, facts as evident, and the clinical opinion of the practitioner. The medical note is intended as peer to peer communication and may appear blunt or direct. It is written in medical language and may contain abbreviations or verbiage that are unfamiliar.

## 2024-03-14 RX ORDER — LANREOTIDE ACETATE 120 MG/.5ML
120 INJECTION SUBCUTANEOUS ONCE
Status: CANCELLED | OUTPATIENT
Start: 2024-03-15

## 2024-03-14 RX ORDER — LANREOTIDE ACETATE 120 MG/.5ML
120 INJECTION SUBCUTANEOUS ONCE
OUTPATIENT
Start: 2024-04-12

## 2024-03-15 ENCOUNTER — OFFICE VISIT (OUTPATIENT)
Dept: HEMATOLOGY/ONCOLOGY | Facility: HOSPITAL | Age: 44
End: 2024-03-15
Attending: INTERNAL MEDICINE
Payer: COMMERCIAL

## 2024-03-15 VITALS
BODY MASS INDEX: 30.04 KG/M2 | HEART RATE: 73 BPM | OXYGEN SATURATION: 100 % | RESPIRATION RATE: 16 BRPM | TEMPERATURE: 98 F | DIASTOLIC BLOOD PRESSURE: 81 MMHG | HEIGHT: 60 IN | SYSTOLIC BLOOD PRESSURE: 128 MMHG | WEIGHT: 153 LBS

## 2024-03-15 DIAGNOSIS — C7A.8 PRIMARY MALIGNANT NEUROENDOCRINE TUMOR OF PANCREAS (HCC): Primary | ICD-10-CM

## 2024-03-15 DIAGNOSIS — D50.0 IRON DEFICIENCY ANEMIA DUE TO CHRONIC BLOOD LOSS: ICD-10-CM

## 2024-03-15 LAB
ALBUMIN SERPL-MCNC: 3.8 G/DL (ref 3.4–5)
ALBUMIN/GLOB SERPL: 1.1 {RATIO} (ref 1–2)
ALP LIVER SERPL-CCNC: 232 U/L
ALT SERPL-CCNC: 79 U/L
ANION GAP SERPL CALC-SCNC: 3 MMOL/L (ref 0–18)
AST SERPL-CCNC: 46 U/L (ref 15–37)
BASOPHILS # BLD AUTO: 0.03 X10(3) UL (ref 0–0.2)
BASOPHILS NFR BLD AUTO: 0.5 %
BILIRUB SERPL-MCNC: 0.6 MG/DL (ref 0.1–2)
BUN BLD-MCNC: 14 MG/DL (ref 9–23)
CALCIUM BLD-MCNC: 9 MG/DL (ref 8.5–10.1)
CHLORIDE SERPL-SCNC: 109 MMOL/L (ref 98–112)
CO2 SERPL-SCNC: 25 MMOL/L (ref 21–32)
CREAT BLD-MCNC: 0.61 MG/DL
EGFRCR SERPLBLD CKD-EPI 2021: 112 ML/MIN/1.73M2 (ref 60–?)
EOSINOPHIL # BLD AUTO: 0.21 X10(3) UL (ref 0–0.7)
EOSINOPHIL NFR BLD AUTO: 3.6 %
ERYTHROCYTE [DISTWIDTH] IN BLOOD BY AUTOMATED COUNT: 15.6 %
FASTING STATUS PATIENT QL REPORTED: NO
GLOBULIN PLAS-MCNC: 3.6 G/DL (ref 2.8–4.4)
GLUCOSE BLD-MCNC: 136 MG/DL (ref 70–99)
HCT VFR BLD AUTO: 35 %
HGB BLD-MCNC: 11.8 G/DL
IMM GRANULOCYTES # BLD AUTO: 0.02 X10(3) UL (ref 0–1)
IMM GRANULOCYTES NFR BLD: 0.3 %
LYMPHOCYTES # BLD AUTO: 0.72 X10(3) UL (ref 1–4)
LYMPHOCYTES NFR BLD AUTO: 12.4 %
MCH RBC QN AUTO: 29.4 PG (ref 26–34)
MCHC RBC AUTO-ENTMCNC: 33.7 G/DL (ref 31–37)
MCV RBC AUTO: 87.1 FL
MONOCYTES # BLD AUTO: 0.32 X10(3) UL (ref 0.1–1)
MONOCYTES NFR BLD AUTO: 5.5 %
NEUTROPHILS # BLD AUTO: 4.52 X10 (3) UL (ref 1.5–7.7)
NEUTROPHILS # BLD AUTO: 4.52 X10(3) UL (ref 1.5–7.7)
NEUTROPHILS NFR BLD AUTO: 77.7 %
OSMOLALITY SERPL CALC.SUM OF ELEC: 287 MOSM/KG (ref 275–295)
PLATELET # BLD AUTO: 158 10(3)UL (ref 150–450)
POTASSIUM SERPL-SCNC: 4.1 MMOL/L (ref 3.5–5.1)
PROT SERPL-MCNC: 7.4 G/DL (ref 6.4–8.2)
RBC # BLD AUTO: 4.02 X10(6)UL
SODIUM SERPL-SCNC: 137 MMOL/L (ref 136–145)
WBC # BLD AUTO: 5.8 X10(3) UL (ref 4–11)

## 2024-03-15 PROCEDURE — 96372 THER/PROPH/DIAG INJ SC/IM: CPT

## 2024-03-15 PROCEDURE — 99215 OFFICE O/P EST HI 40 MIN: CPT | Performed by: INTERNAL MEDICINE

## 2024-03-15 RX ORDER — LANREOTIDE ACETATE 120 MG/.5ML
120 INJECTION SUBCUTANEOUS ONCE
Status: COMPLETED | OUTPATIENT
Start: 2024-03-15 | End: 2024-03-15

## 2024-03-15 RX ADMIN — LANREOTIDE ACETATE 120 MG: 120 INJECTION SUBCUTANEOUS at 09:20:00

## 2024-03-15 NOTE — PROGRESS NOTES
Education Record    Learner:  Patient and Family Member    Disease / Diagnosis:Neuroendocrine carcinoma of pancreas    Barriers / Limitations:  None   Comments:    Method:  Discussion   Comments:    General Topics:  Medication, Side effects and symptom management, and Plan of care reviewed   Comments:    Outcome:  Shows understanding   Comments:    Here for lanreotide injection. Energy is pretty good. Appetite is good. Occasionally has R sided abdominal pain, but only sometimes. Usually is constipated, but had a BM this morning. Occasionally gets headaches/dizziness.

## 2024-03-15 NOTE — PROGRESS NOTES
Education Record    Learner:  Patient    Disease / Diagnosis: Primary malignant neuroendocrine tumor of pancreas     Barriers / Limitations:  None   Comments:    Method:  Brief focused   Comments:    General Topics:  Diet, Infection, Medication, Pain, Precautions, Procedure, Side effects and symptom management, Plan of care reviewed, and Fall risk and prevention   Comments:    Outcome:  Shows understanding   Comments:    D1C2 Lanreotide injection given while patient was laying down on her stomach. \" I can't relax standing up \" ... No issues at this time. Given AVS with future appointments

## 2024-03-15 NOTE — PROGRESS NOTES
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    - tolerating lanreotide well, has some mild constipation  - some intermittent abdominal discomfort but otherwise not bothersome  - seeing Dr Magdaleno on 3/18  - felt better after her IV iron infusion    Past Medical History:  Past Medical History:   Diagnosis Date    Abdominal pain Couple days ago    Blood in the stool Couple months ago    Easy bruising Couple months ago    Flatulence/gas pain/belching Couple days ago    Headache disorder Couple months ago    Heartburn 2 months ago    Heavy menses Couple months ago    Indigestion 2 months ago    Itch of skin Couple months ago    Nausea Couple of days ago    Stress Couple months ago     Uncomfortable fullness after meals Couple days ago    Vomiting Couple days ago    Weight loss Couple months ago     Past Surgical History:   Procedure Laterality Date    NEEDLE BIOPSY LEFT      TUBAL LIGATION         Home Medications:  No outpatient medications have been marked as taking for the 3/15/24 encounter (Office Visit) with Eduar Alfred MD.       Allergies:   No Known Allergies    Psychosocial History:  Social History     Socioeconomic History    Marital status:      Spouse name: Not on file    Number of children: Not on file    Years of education: Not on file    Highest education level: Not on file   Occupational History    Not on file   Tobacco Use    Smoking status: Never    Smokeless tobacco: Never   Vaping Use    Vaping Use: Never used   Substance and Sexual Activity    Alcohol use: Not Currently    Drug use: Never    Sexual activity: Not on file   Other Topics Concern    Not on file   Social History Narrative    Not on file     Social Determinants of Health     Financial Resource Strain: Not on file   Food Insecurity: Not on file   Transportation Needs: Not on file   Physical Activity: Not on file   Stress: Not on file   Social Connections: Not on file   Housing Stability: Not on file       Family Medical History:  No family history on file.    Review of Systems:  A 10-point ROS was done with pertinent positives and negative per the HPI    Vital Signs:  Height: 152.4 cm (5') (03/15 0854)  Weight: 69.4 kg (153 lb) (03/15 0854)  BSA (Calculated - sq m): 1.67 sq meters (03/15 0854)  Pulse: 73 (03/15 0854)  BP: 128/81 (03/15 0854)  Temp: 97.5 °F (36.4 °C) (03/15 0854)  Do Not Use - Resp Rate: --  SpO2: 100 % (03/15 0854)    Wt Readings from Last 6 Encounters:   03/15/24 69.4 kg (153 lb)   02/06/24 68.5 kg (151 lb)   01/17/24 68.3 kg (150 lb 9.6 oz)   01/10/24 67.6 kg (149 lb)   01/09/24 67.8 kg (149 lb 6.4 oz)     Physical Examination:  General: Patient is alert and oriented, not in acute  distress  Psych: Mood and affect are appropriate  Eyes: EOMI, PERRL  ENT: Oropharynx is clear, no adenopathy  CV: no LE edema  Respiratory: Non-labored respirations  GI/Abd: Soft, non-tender  Neurological: Grossly intact   Lymphatics: No palpable inguinal lymphadenopathy  Skin: no rashes or petechiae    Laboratory:  Lab Results   Component Value Date    WBC 6.8 02/06/2024    HGB 11.0 (L) 02/06/2024    HCT 33.6 (L) 02/06/2024    MCV 87.0 02/06/2024    MCH 28.5 02/06/2024    MCHC 32.7 02/06/2024    RDW 13.4 02/06/2024    .0 02/06/2024     Lab Results   Component Value Date     (H) 02/06/2024    BUN 15 02/06/2024    CREATSERUM 0.68 02/06/2024    ANIONGAP 2 02/06/2024    CA 8.5 02/06/2024    OSMOCALC 298 (H) 02/06/2024    ALKPHO 271 (H) 02/06/2024    AST 63 (H) 02/06/2024    ALT 72 (H) 02/06/2024    BILT 0.5 02/06/2024    TP 7.3 02/06/2024    ALB 3.4 02/06/2024    GLOBULIN 3.9 02/06/2024     02/06/2024    K 4.2 02/06/2024     (H) 02/06/2024    CO2 26.0 02/06/2024     No results found for: \"PTT\", \"PT\", \"INR\"      Impression & Plan:     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, grade 2  - metastatic to liver and diffuse lymphadenopathy  - s/p metal biliary stent placement 1/10/24  - baseline chromogranin A 229  - previously discussed incurable prognosis and palliative intent of treatment  - on 1L lanreotide  - plan for restaging dotatate PET after 6 months lanreotide (~8/2024)  - follow up with GI for biliary stent     Iron deficiency anemia  - s/p IV iron dextran 2/14/24  - recheck iron labs ~5/2024    F/u: 1 month    Eduar Alfred  Hematology/Medical Oncology  [facility]

## 2024-03-18 PROBLEM — D3A.8 PRIMARY PANCREATIC NEUROENDOCRINE TUMOR (HCC): Status: ACTIVE | Noted: 2024-01-17

## 2024-03-19 LAB — CHROMOGRANIN A: 263 NG/ML

## 2024-04-02 ENCOUNTER — LAB ENCOUNTER (OUTPATIENT)
Dept: LAB | Age: 44
End: 2024-04-02
Attending: INTERNAL MEDICINE
Payer: COMMERCIAL

## 2024-04-02 DIAGNOSIS — R79.89 ABNORMAL LFTS: ICD-10-CM

## 2024-04-02 LAB
ALBUMIN SERPL-MCNC: 3.1 G/DL (ref 3.4–5)
ALP LIVER SERPL-CCNC: 516 U/L
ALT SERPL-CCNC: 81 U/L
AST SERPL-CCNC: 36 U/L (ref 15–37)
BILIRUB DIRECT SERPL-MCNC: 0.2 MG/DL (ref 0–0.2)
BILIRUB SERPL-MCNC: 0.5 MG/DL (ref 0.1–2)
PROT SERPL-MCNC: 7.4 G/DL (ref 6.4–8.2)

## 2024-04-02 PROCEDURE — 83516 IMMUNOASSAY NONANTIBODY: CPT

## 2024-04-02 PROCEDURE — 80076 HEPATIC FUNCTION PANEL: CPT

## 2024-04-02 PROCEDURE — 36415 COLL VENOUS BLD VENIPUNCTURE: CPT

## 2024-04-03 LAB
ACTIN SMOOTH MUSCLE AB: 8 UNITS
M2 MITOCHONDRIAL AB: 39.5 UNITS

## 2024-04-11 ENCOUNTER — LAB ENCOUNTER (OUTPATIENT)
Dept: LAB | Age: 44
End: 2024-04-11
Attending: INTERNAL MEDICINE
Payer: COMMERCIAL

## 2024-04-11 DIAGNOSIS — K86.89 PANCREATIC MASS (HCC): ICD-10-CM

## 2024-04-11 DIAGNOSIS — R79.89 ABNORMAL LFTS: ICD-10-CM

## 2024-04-11 LAB
ALBUMIN SERPL-MCNC: 3.7 G/DL (ref 3.4–5)
ALBUMIN/GLOB SERPL: 0.9 {RATIO} (ref 1–2)
ALP LIVER SERPL-CCNC: 388 U/L
ALT SERPL-CCNC: 63 U/L
ANION GAP SERPL CALC-SCNC: 7 MMOL/L (ref 0–18)
AST SERPL-CCNC: 61 U/L (ref 15–37)
BILIRUB DIRECT SERPL-MCNC: 0.2 MG/DL (ref 0–0.2)
BILIRUB SERPL-MCNC: 0.5 MG/DL (ref 0.1–2)
BUN BLD-MCNC: 13 MG/DL (ref 9–23)
CALCIUM BLD-MCNC: 9 MG/DL (ref 8.5–10.1)
CANCER AG19-9 SERPL-ACNC: 19.8 U/ML (ref ?–37)
CHLORIDE SERPL-SCNC: 105 MMOL/L (ref 98–112)
CO2 SERPL-SCNC: 23 MMOL/L (ref 21–32)
CREAT BLD-MCNC: 0.6 MG/DL
EGFRCR SERPLBLD CKD-EPI 2021: 113 ML/MIN/1.73M2 (ref 60–?)
ERYTHROCYTE [DISTWIDTH] IN BLOOD BY AUTOMATED COUNT: 15.4 %
FASTING STATUS PATIENT QL REPORTED: YES
GLOBULIN PLAS-MCNC: 4.3 G/DL (ref 2.8–4.4)
GLUCOSE BLD-MCNC: 132 MG/DL (ref 70–99)
HCT VFR BLD AUTO: 35.7 %
HGB BLD-MCNC: 11.9 G/DL
MCH RBC QN AUTO: 28.7 PG (ref 26–34)
MCHC RBC AUTO-ENTMCNC: 33.3 G/DL (ref 31–37)
MCV RBC AUTO: 86.2 FL
OSMOLALITY SERPL CALC.SUM OF ELEC: 282 MOSM/KG (ref 275–295)
PLATELET # BLD AUTO: 235 10(3)UL (ref 150–450)
POTASSIUM SERPL-SCNC: 3.9 MMOL/L (ref 3.5–5.1)
PROT SERPL-MCNC: 8 G/DL (ref 6.4–8.2)
RBC # BLD AUTO: 4.14 X10(6)UL
SODIUM SERPL-SCNC: 135 MMOL/L (ref 136–145)
WBC # BLD AUTO: 8.4 X10(3) UL (ref 4–11)

## 2024-04-11 PROCEDURE — 80053 COMPREHEN METABOLIC PANEL: CPT

## 2024-04-11 PROCEDURE — 86301 IMMUNOASSAY TUMOR CA 19-9: CPT

## 2024-04-11 PROCEDURE — 83516 IMMUNOASSAY NONANTIBODY: CPT

## 2024-04-11 PROCEDURE — 82248 BILIRUBIN DIRECT: CPT

## 2024-04-11 PROCEDURE — 85027 COMPLETE CBC AUTOMATED: CPT

## 2024-04-11 PROCEDURE — 36415 COLL VENOUS BLD VENIPUNCTURE: CPT

## 2024-04-12 ENCOUNTER — OFFICE VISIT (OUTPATIENT)
Dept: HEMATOLOGY/ONCOLOGY | Facility: HOSPITAL | Age: 44
End: 2024-04-12
Attending: INTERNAL MEDICINE
Payer: COMMERCIAL

## 2024-04-12 VITALS
BODY MASS INDEX: 29.64 KG/M2 | SYSTOLIC BLOOD PRESSURE: 113 MMHG | OXYGEN SATURATION: 97 % | RESPIRATION RATE: 16 BRPM | DIASTOLIC BLOOD PRESSURE: 74 MMHG | WEIGHT: 151 LBS | HEIGHT: 60 IN | HEART RATE: 79 BPM | TEMPERATURE: 98 F

## 2024-04-12 DIAGNOSIS — C7A.8 PRIMARY MALIGNANT NEUROENDOCRINE TUMOR OF PANCREAS (HCC): Primary | ICD-10-CM

## 2024-04-12 DIAGNOSIS — D50.0 IRON DEFICIENCY ANEMIA DUE TO CHRONIC BLOOD LOSS: ICD-10-CM

## 2024-04-12 LAB
ACTIN SMOOTH MUSCLE AB: 9 UNITS
ALBUMIN SERPL-MCNC: 3.7 G/DL (ref 3.4–5)
ALBUMIN/GLOB SERPL: 0.9 {RATIO} (ref 1–2)
ALP LIVER SERPL-CCNC: 399 U/L
ALT SERPL-CCNC: 66 U/L
ANION GAP SERPL CALC-SCNC: 2 MMOL/L (ref 0–18)
AST SERPL-CCNC: 60 U/L (ref 15–37)
BASOPHILS # BLD AUTO: 0.05 X10(3) UL (ref 0–0.2)
BASOPHILS NFR BLD AUTO: 0.6 %
BILIRUB SERPL-MCNC: 0.5 MG/DL (ref 0.1–2)
BUN BLD-MCNC: 12 MG/DL (ref 9–23)
CALCIUM BLD-MCNC: 8.9 MG/DL (ref 8.5–10.1)
CHLORIDE SERPL-SCNC: 107 MMOL/L (ref 98–112)
CO2 SERPL-SCNC: 27 MMOL/L (ref 21–32)
CREAT BLD-MCNC: 0.73 MG/DL
DEPRECATED HBV CORE AB SER IA-ACNC: 73.2 NG/ML
EGFRCR SERPLBLD CKD-EPI 2021: 103 ML/MIN/1.73M2 (ref 60–?)
EOSINOPHIL # BLD AUTO: 0.31 X10(3) UL (ref 0–0.7)
EOSINOPHIL NFR BLD AUTO: 3.6 %
ERYTHROCYTE [DISTWIDTH] IN BLOOD BY AUTOMATED COUNT: 15.4 %
GLOBULIN PLAS-MCNC: 4.1 G/DL (ref 2.8–4.4)
GLUCOSE BLD-MCNC: 92 MG/DL (ref 70–99)
HCT VFR BLD AUTO: 35.1 %
HGB BLD-MCNC: 12 G/DL
IMM GRANULOCYTES # BLD AUTO: 0.04 X10(3) UL (ref 0–1)
IMM GRANULOCYTES NFR BLD: 0.5 %
IRON SATN MFR SERPL: 14 %
IRON SERPL-MCNC: 37 UG/DL
LYMPHOCYTES # BLD AUTO: 0.97 X10(3) UL (ref 1–4)
LYMPHOCYTES NFR BLD AUTO: 11.2 %
M2 MITOCHONDRIAL AB: 46.6 UNITS
MCH RBC QN AUTO: 28.9 PG (ref 26–34)
MCHC RBC AUTO-ENTMCNC: 34.2 G/DL (ref 31–37)
MCV RBC AUTO: 84.6 FL
MONOCYTES # BLD AUTO: 0.41 X10(3) UL (ref 0.1–1)
MONOCYTES NFR BLD AUTO: 4.7 %
NEUTROPHILS # BLD AUTO: 6.9 X10 (3) UL (ref 1.5–7.7)
NEUTROPHILS # BLD AUTO: 6.9 X10(3) UL (ref 1.5–7.7)
NEUTROPHILS NFR BLD AUTO: 79.4 %
OSMOLALITY SERPL CALC.SUM OF ELEC: 281 MOSM/KG (ref 275–295)
PLATELET # BLD AUTO: 243 10(3)UL (ref 150–450)
POTASSIUM SERPL-SCNC: 3.9 MMOL/L (ref 3.5–5.1)
PROT SERPL-MCNC: 7.8 G/DL (ref 6.4–8.2)
RBC # BLD AUTO: 4.15 X10(6)UL
SODIUM SERPL-SCNC: 136 MMOL/L (ref 136–145)
TIBC SERPL-MCNC: 259 UG/DL (ref 240–450)
TRANSFERRIN SERPL-MCNC: 174 MG/DL (ref 200–360)
WBC # BLD AUTO: 8.7 X10(3) UL (ref 4–11)

## 2024-04-12 PROCEDURE — 99215 OFFICE O/P EST HI 40 MIN: CPT | Performed by: INTERNAL MEDICINE

## 2024-04-12 PROCEDURE — 96372 THER/PROPH/DIAG INJ SC/IM: CPT

## 2024-04-12 RX ORDER — LANREOTIDE ACETATE 120 MG/.5ML
120 INJECTION SUBCUTANEOUS ONCE
OUTPATIENT
Start: 2024-06-07

## 2024-04-12 RX ORDER — LANREOTIDE ACETATE 120 MG/.5ML
120 INJECTION SUBCUTANEOUS ONCE
Status: COMPLETED | OUTPATIENT
Start: 2024-04-12 | End: 2024-04-12

## 2024-04-12 RX ORDER — LANREOTIDE ACETATE 120 MG/.5ML
120 INJECTION SUBCUTANEOUS ONCE
OUTPATIENT
Start: 2024-05-10

## 2024-04-12 RX ADMIN — LANREOTIDE ACETATE 120 MG: 120 INJECTION SUBCUTANEOUS at 11:43:00

## 2024-04-12 NOTE — PROGRESS NOTES
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    - tolerating lanreotide well  - some intermittent back discomfort but otherwise not bothersome  - felt better after her IV iron infusion    Past Medical History:  Past Medical History:    Abdominal pain    Blood disorder    ANEMIA    Blood in the stool    Cancer (HCC)    pancreas    Constipation    Disorder of liver    elevated enzymes    Easy bruising    Flatulence/gas pain/belching    Headache disorder    Heartburn    Heavy menses    Hemorrhoids    Indigestion    Irregular bowel habits    Itch of skin    Leg swelling    Menses painful    Nausea    Stool incontinence    Stress     Uncomfortable fullness after meals    Visual impairment    reading glasses    Vomiting    Wears glasses    Weight loss     Past Surgical History:   Procedure Laterality Date    Needle biopsy left      Tubal ligation         Home Medications:  No outpatient medications have been marked as taking for the 4/12/24 encounter (Office Visit) with Eduar Alfred MD.       Allergies:   No Known Allergies    Psychosocial History:  Social History     Socioeconomic History    Marital status:      Spouse name: Not on file    Number of children: Not on file    Years of education: Not on file    Highest education level: Not on file   Occupational History    Not on file   Tobacco Use    Smoking status: Never    Smokeless tobacco: Never   Vaping Use    Vaping status: Never Used   Substance and Sexual Activity    Alcohol use: Not Currently    Drug use: Never    Sexual activity: Not on file   Other Topics Concern    Not on file   Social History Narrative    Not on file     Social Determinants of Health     Financial Resource Strain: Not on file   Food Insecurity: Food Insecurity Present (2/9/2024)    [facility], [facility]    Food Insecurity     Currently or in the past 3 months, have you worried your food would run out before you had money to buy more?: Yes     In the past 12 months, have you run out of food or been unable to get more?: No   Transportation Needs: No Transportation Needs (2/9/2024)    [facility], [facility]    Transportation Needs     Currently or in the past 3 months, has lack of transportation kept you from medical appointments, getting food or medicine, or providing care to a family member?: Not on file     : Not on file     Medical Transportation Needs?: No     Daily Living Transportation Needs? [Peds Only] : Not on file   Physical Activity: Not on file   Stress: Not on file   Social Connections:  Unknown (3/13/2021)    Received from [facility], [facility]    Social Connections     Conversations with friends/family/neighbors per week: Not on file   Housing Stability: Low Risk  (11/10/2023)    Received from [facility], [facility]    Housing Stability     Mortgage Payment Concerns?: Not on file     Number of Places Lived in the Last Year: Not on file     Unstable Housing?: Not on file       Family Medical History:  No family history on file.    Review of Systems:  A 10-point ROS was done with pertinent positives and negative per the HPI    Vital Signs:  Height: 152.4 cm (5') (04/12 1121)  Weight: 68.5 kg (151 lb) (04/12 1121)  BSA (Calculated - sq m): 1.66 sq meters (04/12 1121)  Pulse: 79 (04/12 1121)  BP: 113/74 (04/12 1121)  Temp: 97.9 °F (36.6 °C) (04/12 1121)  Do Not Use - Resp Rate: --  SpO2: 97 % (04/12 1121)    Wt Readings from Last 6 Encounters:   04/12/24 68.5 kg (151 lb)   04/08/24 68.5 kg (151 lb)   03/18/24 68.5 kg (151 lb)   03/15/24 69.4 kg (153 lb)   02/06/24 68.5 kg (151 lb)   01/17/24 68.3 kg (150 lb 9.6 oz)     Physical Examination:  General: Patient is alert and oriented, not in acute distress  Psych: Mood and affect are appropriate  Eyes: EOMI, PERRL  ENT: Oropharynx is clear, no adenopathy  CV: no LE edema  Respiratory: Non-labored respirations  GI/Abd: Soft, non-tender  Neurological: Grossly intact   Lymphatics: No palpable inguinal lymphadenopathy  Skin: no rashes or petechiae    Laboratory:  Lab Results   Component Value Date    WBC 8.4 04/11/2024    WBC 5.8 03/15/2024    WBC 6.8 02/06/2024    HGB 11.9 (L) 04/11/2024    HGB 11.8 (L) 03/15/2024    HGB 11.0 (L) 02/06/2024    HCT 35.7 04/11/2024    MCV 86.2 04/11/2024    MCH 28.7 04/11/2024    MCHC 33.3 04/11/2024    RDW 15.4 04/11/2024    .0 04/11/2024    .0 03/15/2024    .0 02/06/2024     Lab Results   Component Value Date     (H)  04/11/2024    BUN 13 04/11/2024    CREATSERUM 0.60 04/11/2024    CREATSERUM 0.61 03/15/2024    CREATSERUM 0.68 02/06/2024    ANIONGAP 7 04/11/2024    CA 9.0 04/11/2024    OSMOCALC 282 04/11/2024    ALKPHO 388 (H) 04/11/2024    AST 61 (H) 04/11/2024    ALT 63 (H) 04/11/2024    BILT 0.5 04/11/2024    TP 8.0 04/11/2024    ALB 3.7 04/11/2024    GLOBULIN 4.3 04/11/2024     (L) 04/11/2024    K 3.9 04/11/2024     04/11/2024    CO2 23.0 04/11/2024     No results found for: \"PTT\", \"PT\", \"INR\"      Impression & Plan:     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, grade 2  - metastatic to liver and diffuse lymphadenopathy  - s/p metal biliary stent placement 1/10/24  - baseline chromogranin A 229  - previously discussed incurable prognosis and palliative intent of treatment  - on 1L lanreotide  - plan for restaging dotatate PET after 6 months lanreotide (~8/2024)  - per GI, no need for routine ERCP for biliary stent     Iron deficiency anemia  - s/p IV iron dextran 2/14/24  - iron labs today with adequate iron stores  - getting EGD with Dr Magdaleno    Elevated LFTs  - there is some concern that she may have concomitant autoimmune liver disease. Dr Magdaleno currently working her up; liver biopsy has been recommended. This is scheduled for 4/23    F/u: 1 month    [facility]  Hematology/Medical Oncology  [facility]

## 2024-04-12 NOTE — H&P (VIEW-ONLY)
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    - tolerating lanreotide well  - some intermittent back discomfort but otherwise not bothersome  - felt better after her IV iron infusion    Past Medical History:  Past Medical History:    Abdominal pain    Blood disorder    ANEMIA    Blood in the stool    Cancer (HCC)    pancreas    Constipation    Disorder of liver    elevated enzymes    Easy bruising    Flatulence/gas pain/belching    Headache disorder    Heartburn    Heavy menses    Hemorrhoids    Indigestion    Irregular bowel habits    Itch of skin    Leg swelling    Menses painful    Nausea    Stool incontinence    Stress     Uncomfortable fullness after meals    Visual impairment    reading glasses    Vomiting    Wears glasses    Weight loss     Past Surgical History:   Procedure Laterality Date    Needle biopsy left      Tubal ligation         Home Medications:  No outpatient medications have been marked as taking for the 4/12/24 encounter (Office Visit) with Eduar Alfred MD.       Allergies:   No Known Allergies    Psychosocial History:  Social History     Socioeconomic History    Marital status:      Spouse name: Not on file    Number of children: Not on file    Years of education: Not on file    Highest education level: Not on file   Occupational History    Not on file   Tobacco Use    Smoking status: Never    Smokeless tobacco: Never   Vaping Use    Vaping status: Never Used   Substance and Sexual Activity    Alcohol use: Not Currently    Drug use: Never    Sexual activity: Not on file   Other Topics Concern    Not on file   Social History Narrative    Not on file     Social Determinants of Health     Financial Resource Strain: Not on file   Food Insecurity: Food Insecurity Present (2/9/2024)    Received from [facility], [facility]    Food Insecurity     Currently or in the past 3 months, have you worried your food would run out before you had money to buy more?: Yes     In the past 12 months, have you run out of food or been unable to get more?: No   Transportation Needs: No Transportation Needs (2/9/2024)    Received from [facility], [facility]    Transportation Needs     Currently or in the past 3 months, has lack of transportation kept you from medical appointments, getting food or medicine, or providing care to a family member?: Not on file     : Not on file     Medical Transportation Needs?: No     Daily Living Transportation Needs? [Peds Only] : Not on file   Physical Activity: Not on file   Stress: Not on file   Social Connections:  Unknown (3/13/2021)    Received from [facility], [facility]    Social Connections     Conversations with friends/family/neighbors per week: Not on file   Housing Stability: Low Risk  (11/10/2023)    Received from [facility], [facility]    Housing Stability     Mortgage Payment Concerns?: Not on file     Number of Places Lived in the Last Year: Not on file     Unstable Housing?: Not on file       Family Medical History:  No family history on file.    Review of Systems:  A 10-point ROS was done with pertinent positives and negative per the HPI    Vital Signs:  Height: 152.4 cm (5') (04/12 1121)  Weight: 68.5 kg (151 lb) (04/12 1121)  BSA (Calculated - sq m): 1.66 sq meters (04/12 1121)  Pulse: 79 (04/12 1121)  BP: 113/74 (04/12 1121)  Temp: 97.9 °F (36.6 °C) (04/12 1121)  Do Not Use - Resp Rate: --  SpO2: 97 % (04/12 1121)    Wt Readings from Last 6 Encounters:   04/12/24 68.5 kg (151 lb)   04/08/24 68.5 kg (151 lb)   03/18/24 68.5 kg (151 lb)   03/15/24 69.4 kg (153 lb)   02/06/24 68.5 kg (151 lb)   01/17/24 68.3 kg (150 lb 9.6 oz)     Physical Examination:  General: Patient is alert and oriented, not in acute distress  Psych: Mood and affect are appropriate  Eyes: EOMI, PERRL  ENT: Oropharynx is clear, no adenopathy  CV: no LE edema  Respiratory: Non-labored respirations  GI/Abd: Soft, non-tender  Neurological: Grossly intact   Lymphatics: No palpable inguinal lymphadenopathy  Skin: no rashes or petechiae    Laboratory:  Lab Results   Component Value Date    WBC 8.4 04/11/2024    WBC 5.8 03/15/2024    WBC 6.8 02/06/2024    HGB 11.9 (L) 04/11/2024    HGB 11.8 (L) 03/15/2024    HGB 11.0 (L) 02/06/2024    HCT 35.7 04/11/2024    MCV 86.2 04/11/2024    MCH 28.7 04/11/2024    MCHC 33.3 04/11/2024    RDW 15.4 04/11/2024    .0 04/11/2024    .0 03/15/2024    .0 02/06/2024     Lab Results   Component Value Date     (H)  04/11/2024    BUN 13 04/11/2024    CREATSERUM 0.60 04/11/2024    CREATSERUM 0.61 03/15/2024    CREATSERUM 0.68 02/06/2024    ANIONGAP 7 04/11/2024    CA 9.0 04/11/2024    OSMOCALC 282 04/11/2024    ALKPHO 388 (H) 04/11/2024    AST 61 (H) 04/11/2024    ALT 63 (H) 04/11/2024    BILT 0.5 04/11/2024    TP 8.0 04/11/2024    ALB 3.7 04/11/2024    GLOBULIN 4.3 04/11/2024     (L) 04/11/2024    K 3.9 04/11/2024     04/11/2024    CO2 23.0 04/11/2024     No results found for: \"PTT\", \"PT\", \"INR\"      Impression & Plan:     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, grade 2  - metastatic to liver and diffuse lymphadenopathy  - s/p metal biliary stent placement 1/10/24  - baseline chromogranin A 229  - previously discussed incurable prognosis and palliative intent of treatment  - on 1L lanreotide  - plan for restaging dotatate PET after 6 months lanreotide (~8/2024)  - per GI, no need for routine ERCP for biliary stent     Iron deficiency anemia  - s/p IV iron dextran 2/14/24  - iron labs today with adequate iron stores  - getting EGD with Dr Magdaleno    Elevated LFTs  - there is some concern that she may have concomitant autoimmune liver disease. Dr Magdaleno currently working her up; liver biopsy has been recommended. This is scheduled for 4/23    F/u: 1 month    [facility]  Hematology/Medical Oncology  [facility]

## 2024-04-12 NOTE — PROGRESS NOTES
Education Record    Learner:  Patient and Spouse    Disease / Diagnosis:Neuroendocrine CA    Barriers / Limitations:  None   Comments:    Method:  Discussion   Comments:    General Topics:  Pain   Comments:    Outcome:  Shows understanding   Comments:    Here for Lanreotide injection. She is feeling more fatigued. She is having normal bowel movements without issue. She is feeling some intermittent middle abdominal discomfort. She is having some middle back pain that started last week. She is eating and drinking well.

## 2024-04-12 NOTE — PROGRESS NOTES
Education Record    Learner:  Patient and Spouse     Disease / Diagnosis: Pancreatic Neuroendocrine Tumor    Barriers / Limitations:  None   Comments:    Method:  Brief focused   Comments:    General Topics:  Plan of care reviewed   Comments:    Outcome:  Shows understanding   Comments:

## 2024-04-15 LAB — CHROMOGRANIN A: 173.2 NG/ML

## 2024-04-23 ENCOUNTER — NURSE ONLY (OUTPATIENT)
Dept: LAB | Facility: HOSPITAL | Age: 44
End: 2024-04-23
Attending: INTERNAL MEDICINE
Payer: COMMERCIAL

## 2024-04-23 ENCOUNTER — HOSPITAL ENCOUNTER (OUTPATIENT)
Dept: ULTRASOUND IMAGING | Facility: HOSPITAL | Age: 44
Discharge: HOME | End: 2024-04-23
Attending: INTERNAL MEDICINE
Payer: COMMERCIAL

## 2024-04-23 VITALS
RESPIRATION RATE: 18 BRPM | HEIGHT: 60 IN | TEMPERATURE: 97 F | DIASTOLIC BLOOD PRESSURE: 69 MMHG | WEIGHT: 151 LBS | OXYGEN SATURATION: 98 % | BODY MASS INDEX: 29.64 KG/M2 | HEART RATE: 67 BPM | SYSTOLIC BLOOD PRESSURE: 98 MMHG

## 2024-04-23 DIAGNOSIS — R79.89 ABNORMAL LFTS: Primary | ICD-10-CM

## 2024-04-23 DIAGNOSIS — R79.89 ABNORMAL LFTS: ICD-10-CM

## 2024-04-23 LAB
INR BLD: 1.05 (ref 0.8–1.2)
PROTHROMBIN TIME: 13.7 SECONDS (ref 11.6–14.8)

## 2024-04-23 PROCEDURE — 47000 NEEDLE BIOPSY OF LIVER PERQ: CPT | Performed by: INTERNAL MEDICINE

## 2024-04-23 PROCEDURE — 88313 SPECIAL STAINS GROUP 2: CPT | Performed by: INTERNAL MEDICINE

## 2024-04-23 PROCEDURE — 36415 COLL VENOUS BLD VENIPUNCTURE: CPT

## 2024-04-23 PROCEDURE — 76942 ECHO GUIDE FOR BIOPSY: CPT | Performed by: INTERNAL MEDICINE

## 2024-04-23 PROCEDURE — 85610 PROTHROMBIN TIME: CPT

## 2024-04-23 PROCEDURE — 88307 TISSUE EXAM BY PATHOLOGIST: CPT | Performed by: INTERNAL MEDICINE

## 2024-04-23 PROCEDURE — 99152 MOD SED SAME PHYS/QHP 5/>YRS: CPT | Performed by: INTERNAL MEDICINE

## 2024-04-23 RX ORDER — ONDANSETRON 2 MG/ML
INJECTION INTRAMUSCULAR; INTRAVENOUS
Status: DISCONTINUED
Start: 2024-04-23 | End: 2024-04-23 | Stop reason: WASHOUT

## 2024-04-23 RX ORDER — NALOXONE HYDROCHLORIDE 0.4 MG/ML
80 INJECTION, SOLUTION INTRAMUSCULAR; INTRAVENOUS; SUBCUTANEOUS AS NEEDED
Status: DISCONTINUED | OUTPATIENT
Start: 2024-04-23 | End: 2024-04-25

## 2024-04-23 RX ORDER — MIDAZOLAM HYDROCHLORIDE 1 MG/ML
INJECTION INTRAMUSCULAR; INTRAVENOUS
Status: COMPLETED
Start: 2024-04-23 | End: 2024-04-23

## 2024-04-23 RX ORDER — NALOXONE HYDROCHLORIDE 0.4 MG/ML
INJECTION, SOLUTION INTRAMUSCULAR; INTRAVENOUS; SUBCUTANEOUS
Status: DISCONTINUED
Start: 2024-04-23 | End: 2024-04-23 | Stop reason: WASHOUT

## 2024-04-23 RX ORDER — SODIUM CHLORIDE 9 MG/ML
INJECTION, SOLUTION INTRAVENOUS CONTINUOUS
Status: DISCONTINUED | OUTPATIENT
Start: 2024-04-23 | End: 2024-04-25

## 2024-04-23 RX ORDER — FLUMAZENIL 0.1 MG/ML
0.2 INJECTION INTRAVENOUS AS NEEDED
Status: DISCONTINUED | OUTPATIENT
Start: 2024-04-23 | End: 2024-04-25

## 2024-04-23 RX ORDER — MIDAZOLAM HYDROCHLORIDE 1 MG/ML
1 INJECTION INTRAMUSCULAR; INTRAVENOUS EVERY 5 MIN PRN
Status: ACTIVE | OUTPATIENT
Start: 2024-04-23 | End: 2024-04-23

## 2024-04-23 RX ORDER — FLUMAZENIL 0.1 MG/ML
INJECTION INTRAVENOUS
Status: DISCONTINUED
Start: 2024-04-23 | End: 2024-04-23 | Stop reason: WASHOUT

## 2024-04-23 RX ADMIN — MIDAZOLAM HYDROCHLORIDE 0.5 MG: 1 INJECTION INTRAMUSCULAR; INTRAVENOUS at 09:46:00

## 2024-04-23 RX ADMIN — MIDAZOLAM HYDROCHLORIDE 1 MG: 1 INJECTION INTRAMUSCULAR; INTRAVENOUS at 09:41:00

## 2024-04-23 RX ADMIN — SODIUM CHLORIDE: 9 INJECTION, SOLUTION INTRAVENOUS at 09:30:00

## 2024-04-23 NOTE — INTERVAL H&P NOTE
The above referenced H&P was reviewed by Diana Briones MD on 4/23/2024, the patient was examined and no significant changes have occurred in the patient's condition since the H&P was performed.  Risks, benefits, alternative treatments and consequences of no treatment were discussed.  We will proceed with procedure as planned.      Diana Briones MD  4/23/2024  9:49 AM

## 2024-04-23 NOTE — PROCEDURES
[facility]   part of [facility]  Procedure Note    Lluvia Nieves Patient Status:  Outpatient    1980 MRN [id number]   Location [facility] ULTRASOUND Attending Pantera Magdaleno MD    Day # 0 PCP PHYSICIAN NONSTAFF     Procedure: US liver biopsy    Pre-Procedure Diagnosis:  Elevated LFT    Post-Procedure Diagnosis: Same    Anesthesia:  Local and Sedation    Findings:  Successful US guided liver biopsy    Specimens: As above    Blood Loss:  <5ml    Tourniquet Time: 0  Complications:  None  Drains:  None    Secondary Diagnosis:  None    Diana Briones MD  2024

## 2024-04-23 NOTE — DISCHARGE INSTRUCTIONS
Home Care [facility] Department of Radiology    Biopsy of any type    Have someone drive you home after your procedure.  You may not drive yourself home    AFTER YOU ARRIVE HOME    Take things easy for the rest of the day after your biopsy.  You may be sore in the biopsy area for one to five days  DO drink plenty of fluids  DO resume your regular diet  DO keep a bandage on the biopsy site for at least 24 hours  DO NOT take a hot bath or shower for at least 12 hours  DO NOT drive or operative machinery for at least 24 hours  DO NOT smoke for at least 24 hours  DO NOT do any strenuous exercise or lifting for at least 48 hours  DO call your doctor immediately if  You start bleeding  There is any change in color or temperature of the area where the biopsy was performed  You develop increasing pain in shortness of breath

## 2024-04-23 NOTE — IMAGING NOTE
Pt here for US Liver Biopsy. IV access to right arm saline lock. 0.9 NS IV initiated to maintain patency.  Informed consent obtained by Dr NASREEN Briones. Positioned pt on stretcher. Obtained biopsy. Specimen sent to Pathology.    Presently denies pain. Tolerated procedure well. Vital signs stable on room air.  Transported pt to Rm [id number] accompanied by Franki ALY. Bedside report given to Lisa ALY.

## 2024-04-29 ENCOUNTER — ANESTHESIA EVENT (OUTPATIENT)
Dept: ENDOSCOPY | Facility: HOSPITAL | Age: 44
End: 2024-04-29
Payer: COMMERCIAL

## 2024-04-29 ENCOUNTER — ANESTHESIA (OUTPATIENT)
Dept: ENDOSCOPY | Facility: HOSPITAL | Age: 44
End: 2024-04-29
Payer: COMMERCIAL

## 2024-04-29 ENCOUNTER — HOSPITAL ENCOUNTER (OUTPATIENT)
Facility: HOSPITAL | Age: 44
Setting detail: HOSPITAL OUTPATIENT SURGERY
Discharge: HOME | End: 2024-04-29
Attending: INTERNAL MEDICINE | Admitting: INTERNAL MEDICINE
Payer: COMMERCIAL

## 2024-04-29 VITALS
SYSTOLIC BLOOD PRESSURE: 104 MMHG | DIASTOLIC BLOOD PRESSURE: 66 MMHG | RESPIRATION RATE: 16 BRPM | TEMPERATURE: 98 F | HEIGHT: 60 IN | OXYGEN SATURATION: 95 % | WEIGHT: 151 LBS | BODY MASS INDEX: 29.64 KG/M2 | HEART RATE: 86 BPM

## 2024-04-29 DIAGNOSIS — D50.9 IRON DEFICIENCY ANEMIA, UNSPECIFIED IRON DEFICIENCY ANEMIA TYPE: ICD-10-CM

## 2024-04-29 LAB — B-HCG UR QL: NEGATIVE

## 2024-04-29 PROCEDURE — 0DB98ZX EXCISION OF DUODENUM, VIA NATURAL OR ARTIFICIAL OPENING ENDOSCOPIC, DIAGNOSTIC: ICD-10-PCS | Performed by: INTERNAL MEDICINE

## 2024-04-29 PROCEDURE — 88305 TISSUE EXAM BY PATHOLOGIST: CPT | Performed by: INTERNAL MEDICINE

## 2024-04-29 PROCEDURE — 81025 URINE PREGNANCY TEST: CPT

## 2024-04-29 PROCEDURE — 0DB68ZX EXCISION OF STOMACH, VIA NATURAL OR ARTIFICIAL OPENING ENDOSCOPIC, DIAGNOSTIC: ICD-10-PCS | Performed by: INTERNAL MEDICINE

## 2024-04-29 PROCEDURE — 0DB78ZX EXCISION OF STOMACH, PYLORUS, VIA NATURAL OR ARTIFICIAL OPENING ENDOSCOPIC, DIAGNOSTIC: ICD-10-PCS | Performed by: INTERNAL MEDICINE

## 2024-04-29 RX ORDER — SODIUM CHLORIDE, SODIUM LACTATE, POTASSIUM CHLORIDE, CALCIUM CHLORIDE 600; 310; 30; 20 MG/100ML; MG/100ML; MG/100ML; MG/100ML
INJECTION, SOLUTION INTRAVENOUS CONTINUOUS
Status: DISCONTINUED | OUTPATIENT
Start: 2024-04-29 | End: 2024-04-29

## 2024-04-29 RX ADMIN — SODIUM CHLORIDE, SODIUM LACTATE, POTASSIUM CHLORIDE, CALCIUM CHLORIDE: 600; 310; 30; 20 INJECTION, SOLUTION INTRAVENOUS at 09:04:00

## 2024-04-29 NOTE — ANESTHESIA PREPROCEDURE EVALUATION
PRE-OP EVALUATION    Patient Name: Lluvia Nieves    Admit Diagnosis: Iron deficiency anemia, unspecified iron deficiency anemia type [D50.9]    Pre-op Diagnosis: Iron deficiency anemia, unspecified iron deficiency anemia type [D50.9]    ESOPHAGOGASTRODUODENOSCOPY (EGD)    Anesthesia Procedure: ESOPHAGOGASTRODUODENOSCOPY (EGD)    Surgeons and Role:     * Pantera Magdaleno MD - Primary    Pre-op vitals reviewed.  Temp: 98.2 °F (36.8 °C)  Pulse: 78  Resp: 16  BP: 117/73  SpO2: 99 %  Body mass index is 29.49 kg/m².    Current medications reviewed.  Hospital Medications:   lactated ringers infusion   Intravenous Continuous       Outpatient Medications:     Medications Prior to Admission   Medication Sig Dispense Refill Last Dose    NON FORMULARY Unsure of name of medication every 4 weeks injection for cancer of pancreas.   Past Month    Ursodiol 500 MG Oral Tab Take 1 tablet (500 mg total) by mouth in the morning and 1 tablet (500 mg total) before bedtime. 180 tablet 3 More than a month       Allergies: Patient has no known allergies.      Anesthesia Evaluation    Patient summary reviewed.    Anesthetic Complications  (-) history of anesthetic complications         GI/Hepatic/Renal  Comment: Pancreatic head mass    (+) GERD          (+) liver disease                 Cardiovascular        Exercise tolerance: good     MET: >4                                           Endo/Other                                  Pulmonary                           Neuro/Psych                              Heartburn  Indigestion  Abdominal pain  Nausea  Vomiting  Uncomfortable fullness after meals  Flatulence/gas pain/belching  Blood in the stool  Weight loss  Headache disorder  Heavy menses  Stress  Itch of skin  Easy bruising            Procedure Laterality Date    Needle biopsy left      Tubal ligation       Social History     Socioeconomic History    Marital status:    Tobacco Use    Smoking status: Never    Smokeless tobacco: Never    Vaping Use    Vaping status: Never Used   Substance and Sexual Activity    Alcohol use: Not Currently    Drug use: Never     History   Drug Use Unknown     Available pre-op labs reviewed.  Lab Results   Component Value Date    WBC 8.7 04/12/2024    RBC 4.15 04/12/2024    HGB 12.0 04/12/2024    HCT 35.1 04/12/2024    MCV 84.6 04/12/2024    MCH 28.9 04/12/2024    MCHC 34.2 04/12/2024    RDW 15.4 04/12/2024    .0 04/12/2024     Lab Results   Component Value Date     04/12/2024    K 3.9 04/12/2024     04/12/2024    CO2 27.0 04/12/2024    BUN 12 04/12/2024    CREATSERUM 0.73 04/12/2024    GLU 92 04/12/2024    CA 8.9 04/12/2024     Lab Results   Component Value Date    INR 1.05 04/23/2024         Airway      Mallampati: II  Mouth opening: 3 FB  TM distance: 4 - 6 cm  Neck ROM: full Cardiovascular    Cardiovascular exam normal.         Dental  Comment: Normal wear/minor imperfections and discoloration noted. No grossly weakened or damaged teeth on exam.           Pulmonary    Pulmonary exam normal.                 Other findings            ASA: 2   Plan: MAC  NPO status verified and patient meets guidelines.        Comment: We have decided on MAC for this procedure. MAC stands for Monitored Anesthesia Care and is a type of sedation. The patient will be given medications through the IV to relax the patient and help with pain control. During MAC the patient will be breathing on their own during the case. The patient has a risk of awareness before, during and after the procedure. There is also a risk of requiring general anesthesia and placement of a breathing tube should the patient not breathe well or should MAC not provide adequate sedation for the patient to tolerate the procedure. All questions were answered.                    Present on Admission:  **None**

## 2024-04-29 NOTE — DISCHARGE INSTRUCTIONS

## 2024-04-29 NOTE — OPERATIVE REPORT
EGD Operative Report  Patient Name: Lluvia Nieves  YOB: 1980  MRN: [id number]  Procedure: Esophagogastroduodenoscopy (EGD)  with cold forceps biopsies  Pre-operative Diagnosis & Indication: iron deficiency anemia  Post-operative Diagnosis:   Prominent fundal veins vs small gastric varices  Biliary stent  Attending Endoscopist: Pantera Magdaleno MD  Informed Consent: The planned procedure(s), the explanation of the procedure, its expected benefits, the potential complications and risks and possible alternatives and their benefits and risks were discussed with the patient or the patient's surrogate. The discussion of risks, not limited to but including bleeding, infection, perforation, adverse effects from anesthesia, need for emergency surgery/prolonged hospitalization,  cardiac arrhythmias,  and aspiration were discussed with patient.  Pt and/or surrogate understood the proposed procedure(s), its risks, benefits and alternatives and wish to proceed with procedure(s). All questions answered in full.  After all questions were answered to their satisfaction, a signed, informed, and witnessed consent was obtained.  Physical Exam: Heart: regular rate and rhythm. No rubs, murmurs, or gallops. Lungs: Clear to auscultation bilaterally. Abdomen: Soft, non-tender, non distended. No rebound tenderness, no guarding.   A TIME OUT WAS COMPLETED prior to the procedure to confirm the patient, procedure(s) and complete endoscopy safety procedure.  Sedation: Monitored Anesthesia Care; ASA class per anesthesiology team   Monitoring: Pulsoximetry, pulse, respirations, and blood pressure , vitals were monitored throughout the entire procedure under monitored anesthesia care.   Procedure: The patient was then brought to the endoscopy suite where his/her pulse, pulse oximetry and blood pressure were monitored. The patient was placed in the left lateral decubitus position and deep sedation was administered. Once adequate sedation  was achieved, a bite block was placed and a lubricated tip of an Olympus video upper endoscope was inserted through the oropharynx and gently manipulated through the esophagus into the stomach and the second portion of the duodenum. Upon withdrawal of the endoscope, careful visualization of the mucosa was performed. The endoscope was then withdrawn into the gastric antrum and placed in a retroflexed position.  The endoscope was then righted, and air was suctioned from the stomach.  The endoscope was then withdrawn from the patient, with careful visual inspection of the mucosa. The patient left the procedure room in stable condition for recovery. Findings and endotherapy as listed below  Toleration: Patient tolerated procedure well   Complications: No immediate complications   Technical Difficulty:  The procedure was not technically difficult   Estimated Blood Loss: Minimal, less than 5mL of estimated blood loss.   Findings and Therapeutics:  Esophagus:   The mucosa was normal.   There were no strictures or stenosis. GEJ junction traversed with endoscope without resistance.  The Z-line was irregular, appreciated at 34 cm from the incisors.    Stomach:    There were prominent veins in the gastric fundus, but none around the GE junction and none had a grape like appearance; otherwise, the gastric body, antrum, cardia, and angularis were normal. No ulcers, erosions or masses visualized. No portal hypertensive gastropathy was noted. Endoscope was placed in a retroflexion view in the stomach. There was  no evidence of a hiatal hernia. Biopsies with cold forceps were obtained in the antrum and body.  Duodenum:   The entire examined duodenum was normal. A biliary stent was identified. Biopsies with cold forceps were obtained in the bulb and 2nd portion.   Recommendations:  Post EGD precautions, watch for bleeding, infection, perforation, adverse drug reactions   Follow-up biopsies  Avoid non-aspirin NSAID  Will likely  recommend CT enterography wwo pending biopsy results, to assess for small bowel lesions as well as vascular abnormalities given fundal findings.    Pantera Magdaleno MD  4/29/2024  9:00 AM

## 2024-04-29 NOTE — ANESTHESIA POSTPROCEDURE EVALUATION
[facility]    Lluvia Nieves Patient Status:  Hospital Outpatient Surgery   Age/Gender 43 year old female MRN [id number]   Location [facility] ENDOSCOPY [facility] Attending Pantera Magdaleno MD   Hosp Day # 0 PCP PHYSICIAN NONSTAFF       Anesthesia Post-op Note    ESOPHAGOGASTRODUODENOSCOPY (EGD) with biopsies    Procedure Summary       Date: 04/29/24 Room / Location:  ENDOSCOPY 02 /  ENDOSCOPY    Anesthesia Start: 0849 Anesthesia Stop: 0904    Procedure: ESOPHAGOGASTRODUODENOSCOPY (EGD) with biopsies Diagnosis:       Iron deficiency anemia, unspecified iron deficiency anemia type      (prominent fundal vein)    Surgeons: Pantera Magdaleno MD Anesthesiologist: Jose Raul Hawkins MD    Anesthesia Type: MAC ASA Status: 2            Anesthesia Type: MAC    Vitals Value Taken Time   BP 81/58 04/29/24 0904   Temp n 04/29/24 0905   Pulse 95 04/29/24 0904   Resp 14 04/29/24 0905   SpO2 91 % 04/29/24 0904   Vitals shown include unfiled device data.    Patient Location: Endoscopy    Anesthesia Type: MAC    Airway Patency: patent    Postop Pain Control: adequate    Mental Status: mildly sedated but able to meaningfully participate in the post-anesthesia evaluation    Nausea/Vomiting: none    Cardiopulmonary/Hydration status: stable euvolemic    Complications: no apparent anesthesia related complications    Postop vital signs: stable    Dental Exam: Unchanged from Preop    Patient to be discharged home when criteria met.

## 2024-04-29 NOTE — H&P
History & Physical Examination    Patient Name: Lluvia Nieves  MRN: [id number]  [facility]: [id number]  YOB: 1980    Diagnosis: BUD    Present Illness: 42 y/o F history of BUD presents for EGD.    Medications Prior to Admission   Medication Sig Dispense Refill Last Dose    NON FORMULARY Unsure of name of medication every 4 weeks injection for cancer of pancreas.   Past Month    Ursodiol 500 MG Oral Tab Take 1 tablet (500 mg total) by mouth in the morning and 1 tablet (500 mg total) before bedtime. 180 tablet 3 More than a month     Current Facility-Administered Medications   Medication Dose Route Frequency    lactated ringers infusion   Intravenous Continuous       Allergies: No Known Allergies    Past Medical History:    Abdominal pain    Blood disorder    ANEMIA    Blood in the stool    Cancer (HCC)    pancreas    Constipation    Disorder of liver    elevated enzymes    Easy bruising    Flatulence/gas pain/belching    Headache disorder    Heartburn    Heavy menses    Hemorrhoids    Indigestion    Irregular bowel habits    Itch of skin    Leg swelling    Menses painful    Nausea    Stool incontinence    Stress    Uncomfortable fullness after meals    Visual impairment    reading glasses    Vomiting    Wears glasses    Weight loss     Past Surgical History:   Procedure Laterality Date    Needle biopsy left      Tubal ligation       History reviewed. No pertinent family history.  Social History     Tobacco Use    Smoking status: Never    Smokeless tobacco: Never   Substance Use Topics    Alcohol use: Not Currently       SYSTEM Check if Review is Normal Check if Physical Exam is Normal If not normal, please explain:   HEENT [ x] [x ]    NECK & BACK [ x] [ x]    HEART [ x] [x ]    LUNGS [ x] [ x]    ABDOMEN [ x] [x ]    UROGENITAL [ n/a] [ n/a]    EXTREMITIES [ x] [x ]    OTHER        [ x ] I have discussed the risks and benefits and alternatives with the patient/family.  They understand and agree to proceed with  plan of care.  [ x ] I have reviewed the History and Physical done within the last 30 days.  Any changes noted above.    Pantera Magdaleno MD  4/29/2024  8:46 AM

## 2024-05-09 NOTE — PROGRESS NOTES
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    - tolerating lanreotide well  - recently started on ursodiol for new dx of primary biliary cholangitis  - reports her previous abdominal discomfort is less now  - EGD did not show any GI bleeding    Past Medical History:  Past Medical History:    Abdominal pain    Blood disorder    ANEMIA    Blood in the stool    Cancer (HCC)    pancreas    Constipation    Disorder of liver    elevated enzymes    Easy bruising    Flatulence/gas pain/belching     Headache disorder    Heartburn    Heavy menses    Hemorrhoids    Indigestion    Irregular bowel habits    Itch of skin    Leg swelling    Menses painful    Nausea    Stool incontinence    Stress    Uncomfortable fullness after meals    Visual impairment    reading glasses    Vomiting    Wears glasses    Weight loss     Past Surgical History:   Procedure Laterality Date    Needle biopsy left      Tubal ligation         Home Medications:  No outpatient medications have been marked as taking for the 5/10/24 encounter (Appointment) with Eduar Alfred MD.       Allergies:   No Known Allergies    Psychosocial History:  Social History     Socioeconomic History    Marital status:      Spouse name: Not on file    Number of children: Not on file    Years of education: Not on file    Highest education level: Not on file   Occupational History    Not on file   Tobacco Use    Smoking status: Never    Smokeless tobacco: Never   Vaping Use    Vaping status: Never Used   Substance and Sexual Activity    Alcohol use: Not Currently    Drug use: Never    Sexual activity: Not on file   Other Topics Concern    Not on file   Social History Narrative    Not on file     Social Determinants of Health     Financial Resource Strain: Not on file   Food Insecurity: Food Insecurity Present (2/9/2024)    [facility]    Food Insecurity     Currently or in the past 3 months, have you worried your food would run out before you had money to buy more?: Yes     In the past 12 months, have you run out of food or been unable to get more?: No   Transportation Needs: No Transportation Needs (2/9/2024)    [facility], [facility]    Transportation Needs     Currently or in the past 3 months, has lack of transportation kept you from medical appointments, getting food or medicine, or providing care to a family member?: Not on file     :  Not on file     Medical Transportation Needs?: No     Daily Living Transportation Needs? [Peds Only] : Not on file   Physical Activity: Not on file   Stress: Not on file   Social Connections: Unknown (3/13/2021)    [facility]    Social Connections     Conversations with friends/family/neighbors per week: Not on file   Housing Stability: Low Risk  (11/10/2023)    [facility]    Housing Stability     Mortgage Payment Concerns?: Not on file     Number of Places Lived in the Last Year: Not on file     Unstable Housing?: Not on file       Family Medical History:  No family history on file.    Review of Systems:  A 10-point ROS was done with pertinent positives and negative per the HPI    Vital Signs:  Height: --  Weight: --  BSA (Calculated - sq m): --  Pulse: --  BP: --  Temp: --  Do Not Use - Resp Rate: --  SpO2: --    Wt Readings from Last 6 Encounters:   04/29/24 68.5 kg (151 lb)   04/12/24 68.5 kg (151 lb)   04/12/24 68.5 kg (151 lb)   03/18/24 68.5 kg (151 lb)   03/15/24 69.4 kg (153 lb)   02/06/24 68.5 kg (151 lb)     Physical Examination:  General: Patient is alert and oriented, not in acute distress  Psych: Mood and affect are appropriate  Eyes: EOMI, PERRL  ENT: Oropharynx is clear, no adenopathy  CV: no LE edema  Respiratory: Non-labored respirations  GI/Abd: Soft, non-tender  Neurological: Grossly intact   Lymphatics: No palpable inguinal lymphadenopathy  Skin: no rashes or petechiae    Laboratory:  Lab Results   Component Value Date    WBC 8.7 04/12/2024    WBC 8.4 04/11/2024    WBC 5.8 03/15/2024    HGB 12.0 04/12/2024    HGB 11.9 (L) 04/11/2024    HGB 11.8 (L) 03/15/2024    HCT 35.1 04/12/2024    MCV 84.6 04/12/2024    MCH 28.9 04/12/2024    MCHC 34.2 04/12/2024    RDW 15.4 04/12/2024    .0 04/12/2024    .0 04/11/2024    .0 03/15/2024     Lab Results    Component Value Date    GLU 92 04/12/2024    BUN 12 04/12/2024    CREATSERUM 0.73 04/12/2024    CREATSERUM 0.60 04/11/2024    CREATSERUM 0.61 03/15/2024    ANIONGAP 2 04/12/2024    CA 8.9 04/12/2024    OSMOCALC 281 04/12/2024    ALKPHO 399 (H) 04/12/2024    AST 60 (H) 04/12/2024    ALT 66 (H) 04/12/2024    BILT 0.5 04/12/2024    TP 7.8 04/12/2024    ALB 3.7 04/12/2024    GLOBULIN 4.1 04/12/2024     04/12/2024    K 3.9 04/12/2024     04/12/2024    CO2 27.0 04/12/2024     Lab Results   Component Value Date    INR 1.05 04/23/2024         Impression & Plan:     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, grade 2  - metastatic to liver and diffuse lymphadenopathy (ki-67 3%)  - s/p metal biliary stent placement 1/10/24  - baseline chromogranin A 229  - previously discussed incurable prognosis and palliative intent of treatment  - on 1L lanreotide  - plan for restaging dotatate PET after 6 months lanreotide (~8/2024); she is already getting a CT enterography A/P which will help to reassess her disease as well  - per GI, no need for routine ERCP for biliary stent     Iron deficiency anemia  - s/p IV iron dextran 2/14/24  - iron stores adequate but borderline in 4/2024; repeat labs 7/2024    Primary biliary cholangitis  - on ursodiol; following with Dr Magdaleno    F/u: 1 month    Eduar Alfred  Hematology/Medical Oncology  [facility]

## 2024-05-10 ENCOUNTER — OFFICE VISIT (OUTPATIENT)
Dept: HEMATOLOGY/ONCOLOGY | Facility: HOSPITAL | Age: 44
End: 2024-05-10
Attending: INTERNAL MEDICINE
Payer: COMMERCIAL

## 2024-05-10 VITALS
OXYGEN SATURATION: 96 % | WEIGHT: 144 LBS | SYSTOLIC BLOOD PRESSURE: 110 MMHG | DIASTOLIC BLOOD PRESSURE: 74 MMHG | HEIGHT: 60 IN | HEART RATE: 82 BPM | RESPIRATION RATE: 16 BRPM | TEMPERATURE: 98 F | BODY MASS INDEX: 28.27 KG/M2

## 2024-05-10 DIAGNOSIS — C7A.8 PRIMARY MALIGNANT NEUROENDOCRINE TUMOR OF PANCREAS (HCC): Primary | ICD-10-CM

## 2024-05-10 DIAGNOSIS — K74.3 PRIMARY BILIARY CHOLANGITIS (HCC): ICD-10-CM

## 2024-05-10 LAB
ALBUMIN SERPL-MCNC: 3.2 G/DL (ref 3.4–5)
ALBUMIN/GLOB SERPL: 0.6 {RATIO} (ref 1–2)
ALP LIVER SERPL-CCNC: 336 U/L
ALT SERPL-CCNC: 50 U/L
ANION GAP SERPL CALC-SCNC: 3 MMOL/L (ref 0–18)
AST SERPL-CCNC: 38 U/L (ref 15–37)
BASOPHILS # BLD AUTO: 0.04 X10(3) UL (ref 0–0.2)
BASOPHILS NFR BLD AUTO: 0.4 %
BILIRUB SERPL-MCNC: 0.6 MG/DL (ref 0.1–2)
BUN BLD-MCNC: 11 MG/DL (ref 9–23)
CALCIUM BLD-MCNC: 8.8 MG/DL (ref 8.5–10.1)
CHLORIDE SERPL-SCNC: 105 MMOL/L (ref 98–112)
CO2 SERPL-SCNC: 29 MMOL/L (ref 21–32)
CREAT BLD-MCNC: 0.62 MG/DL
EGFRCR SERPLBLD CKD-EPI 2021: 112 ML/MIN/1.73M2 (ref 60–?)
EOSINOPHIL # BLD AUTO: 0.32 X10(3) UL (ref 0–0.7)
EOSINOPHIL NFR BLD AUTO: 3.4 %
ERYTHROCYTE [DISTWIDTH] IN BLOOD BY AUTOMATED COUNT: 14.5 %
GLOBULIN PLAS-MCNC: 5.3 G/DL (ref 2.8–4.4)
GLUCOSE BLD-MCNC: 119 MG/DL (ref 70–99)
HCT VFR BLD AUTO: 34.5 %
HGB BLD-MCNC: 11.3 G/DL
IMM GRANULOCYTES # BLD AUTO: 0.04 X10(3) UL (ref 0–1)
IMM GRANULOCYTES NFR BLD: 0.4 %
LYMPHOCYTES # BLD AUTO: 0.55 X10(3) UL (ref 1–4)
LYMPHOCYTES NFR BLD AUTO: 5.8 %
MCH RBC QN AUTO: 28 PG (ref 26–34)
MCHC RBC AUTO-ENTMCNC: 32.8 G/DL (ref 31–37)
MCV RBC AUTO: 85.6 FL
MONOCYTES # BLD AUTO: 0.54 X10(3) UL (ref 0.1–1)
MONOCYTES NFR BLD AUTO: 5.7 %
NEUTROPHILS # BLD AUTO: 8.01 X10 (3) UL (ref 1.5–7.7)
NEUTROPHILS # BLD AUTO: 8.01 X10(3) UL (ref 1.5–7.7)
NEUTROPHILS NFR BLD AUTO: 84.3 %
OSMOLALITY SERPL CALC.SUM OF ELEC: 285 MOSM/KG (ref 275–295)
PLATELET # BLD AUTO: 327 10(3)UL (ref 150–450)
POTASSIUM SERPL-SCNC: 4.2 MMOL/L (ref 3.5–5.1)
PROT SERPL-MCNC: 8.5 G/DL (ref 6.4–8.2)
RBC # BLD AUTO: 4.03 X10(6)UL
SODIUM SERPL-SCNC: 137 MMOL/L (ref 136–145)
WBC # BLD AUTO: 9.5 X10(3) UL (ref 4–11)

## 2024-05-10 PROCEDURE — 99215 OFFICE O/P EST HI 40 MIN: CPT | Performed by: INTERNAL MEDICINE

## 2024-05-10 PROCEDURE — 96372 THER/PROPH/DIAG INJ SC/IM: CPT

## 2024-05-10 RX ORDER — LANREOTIDE ACETATE 120 MG/.5ML
120 INJECTION SUBCUTANEOUS ONCE
Status: COMPLETED | OUTPATIENT
Start: 2024-05-10 | End: 2024-05-10

## 2024-05-10 RX ADMIN — LANREOTIDE ACETATE 120 MG: 120 INJECTION SUBCUTANEOUS at 09:10:00

## 2024-05-10 NOTE — PROGRESS NOTES
Education Record    Learner:  Patient    Disease / Diagnosis: Neuroendocrine tumor    Barriers / Limitations:  None   Comments:    Method:  Brief focused and Reinforcement   Comments:    General Topics:  Plan of care reviewed   Comments:    Outcome:  Shows understanding   Comments:    Patient tolerated Lanreotide injection and discharged in stable condition.

## 2024-05-10 NOTE — PROGRESS NOTES
Education Record    Learner:  Patient and Spouse    Disease / Diagnosis: Pancreatic neuroendocrine tumor    Barriers / Limitations:  None   Comments:    Method:  Discussion   Comments:    General Topics:  Medication, Side effects and symptom management, and Plan of care reviewed   Comments:    Outcome:  Shows understanding   Comments:    Here for c4 lanreotide injection. Pain has improved since last visit. Reports low energy, but better than before the iron infusion. Appetite good. Bms normal.

## 2024-05-13 LAB — CHROMOGRANIN A: 175 NG/ML

## 2024-05-17 ENCOUNTER — HOSPITAL ENCOUNTER (OUTPATIENT)
Dept: CT IMAGING | Facility: HOSPITAL | Age: 44
Discharge: HOME | End: 2024-05-17
Attending: INTERNAL MEDICINE

## 2024-05-17 DIAGNOSIS — D50.9 IRON DEFICIENCY ANEMIA, UNSPECIFIED IRON DEFICIENCY ANEMIA TYPE: ICD-10-CM

## 2024-05-17 PROCEDURE — 74177 CT ABD & PELVIS W/CONTRAST: CPT | Performed by: INTERNAL MEDICINE

## 2024-06-07 ENCOUNTER — OFFICE VISIT (OUTPATIENT)
Dept: HEMATOLOGY/ONCOLOGY | Facility: HOSPITAL | Age: 44
End: 2024-06-07
Attending: INTERNAL MEDICINE
Payer: COMMERCIAL

## 2024-06-07 ENCOUNTER — TELEPHONE (OUTPATIENT)
Dept: HEMATOLOGY/ONCOLOGY | Facility: HOSPITAL | Age: 44
End: 2024-06-07

## 2024-06-07 VITALS
WEIGHT: 144.38 LBS | RESPIRATION RATE: 16 BRPM | HEIGHT: 60 IN | OXYGEN SATURATION: 96 % | HEART RATE: 72 BPM | TEMPERATURE: 98 F | SYSTOLIC BLOOD PRESSURE: 111 MMHG | DIASTOLIC BLOOD PRESSURE: 74 MMHG | BODY MASS INDEX: 28.35 KG/M2

## 2024-06-07 DIAGNOSIS — C7A.8 PRIMARY MALIGNANT NEUROENDOCRINE TUMOR OF PANCREAS (HCC): ICD-10-CM

## 2024-06-07 DIAGNOSIS — C7A.8 PRIMARY MALIGNANT NEUROENDOCRINE TUMOR OF PANCREAS (HCC): Primary | ICD-10-CM

## 2024-06-07 DIAGNOSIS — C7B.8 NEUROENDOCRINE CARCINOMA METASTATIC TO INTRA-ABDOMINAL LYMPH NODE (HCC): ICD-10-CM

## 2024-06-07 DIAGNOSIS — C7A.8 NEUROENDOCRINE CARCINOMA METASTATIC TO INTRA-ABDOMINAL LYMPH NODE (HCC): ICD-10-CM

## 2024-06-07 DIAGNOSIS — C7B.8 METASTATIC MALIGNANT NEUROENDOCRINE TUMOR TO LIVER (HCC): Primary | ICD-10-CM

## 2024-06-07 DIAGNOSIS — D50.0 IRON DEFICIENCY ANEMIA DUE TO CHRONIC BLOOD LOSS: ICD-10-CM

## 2024-06-07 LAB
ALBUMIN SERPL-MCNC: 3.4 G/DL (ref 3.4–5)
ALBUMIN/GLOB SERPL: 0.8 {RATIO} (ref 1–2)
ALP LIVER SERPL-CCNC: 205 U/L
ALT SERPL-CCNC: 37 U/L
ANION GAP SERPL CALC-SCNC: 8 MMOL/L (ref 0–18)
AST SERPL-CCNC: 36 U/L (ref 15–37)
BASOPHILS # BLD AUTO: 0.04 X10(3) UL (ref 0–0.2)
BASOPHILS NFR BLD AUTO: 0.7 %
BILIRUB SERPL-MCNC: 0.6 MG/DL (ref 0.1–2)
BUN BLD-MCNC: 7 MG/DL (ref 9–23)
CALCIUM BLD-MCNC: 8.7 MG/DL (ref 8.5–10.1)
CHLORIDE SERPL-SCNC: 105 MMOL/L (ref 98–112)
CO2 SERPL-SCNC: 25 MMOL/L (ref 21–32)
CREAT BLD-MCNC: 0.58 MG/DL
DEPRECATED HBV CORE AB SER IA-ACNC: 30.4 NG/ML
EGFRCR SERPLBLD CKD-EPI 2021: 113 ML/MIN/1.73M2 (ref 60–?)
EOSINOPHIL # BLD AUTO: 0.23 X10(3) UL (ref 0–0.7)
EOSINOPHIL NFR BLD AUTO: 4 %
ERYTHROCYTE [DISTWIDTH] IN BLOOD BY AUTOMATED COUNT: 14.9 %
GLOBULIN PLAS-MCNC: 4.5 G/DL (ref 2.8–4.4)
GLUCOSE BLD-MCNC: 169 MG/DL (ref 70–99)
HCT VFR BLD AUTO: 31.8 %
HGB BLD-MCNC: 10.6 G/DL
IMM GRANULOCYTES # BLD AUTO: 0.02 X10(3) UL (ref 0–1)
IMM GRANULOCYTES NFR BLD: 0.3 %
IRON SATN MFR SERPL: 11 %
IRON SERPL-MCNC: 32 UG/DL
LYMPHOCYTES # BLD AUTO: 0.78 X10(3) UL (ref 1–4)
LYMPHOCYTES NFR BLD AUTO: 13.4 %
MCH RBC QN AUTO: 28.3 PG (ref 26–34)
MCHC RBC AUTO-ENTMCNC: 33.3 G/DL (ref 31–37)
MCV RBC AUTO: 84.8 FL
MONOCYTES # BLD AUTO: 0.36 X10(3) UL (ref 0.1–1)
MONOCYTES NFR BLD AUTO: 6.2 %
NEUTROPHILS # BLD AUTO: 4.38 X10 (3) UL (ref 1.5–7.7)
NEUTROPHILS # BLD AUTO: 4.38 X10(3) UL (ref 1.5–7.7)
NEUTROPHILS NFR BLD AUTO: 75.4 %
OSMOLALITY SERPL CALC.SUM OF ELEC: 288 MOSM/KG (ref 275–295)
PLATELET # BLD AUTO: 196 10(3)UL (ref 150–450)
POTASSIUM SERPL-SCNC: 3.9 MMOL/L (ref 3.5–5.1)
PROT SERPL-MCNC: 7.9 G/DL (ref 6.4–8.2)
RBC # BLD AUTO: 3.75 X10(6)UL
SODIUM SERPL-SCNC: 138 MMOL/L (ref 136–145)
TIBC SERPL-MCNC: 289 UG/DL (ref 240–450)
TRANSFERRIN SERPL-MCNC: 194 MG/DL (ref 200–360)
WBC # BLD AUTO: 5.8 X10(3) UL (ref 4–11)

## 2024-06-07 PROCEDURE — 99215 OFFICE O/P EST HI 40 MIN: CPT | Performed by: INTERNAL MEDICINE

## 2024-06-07 RX ORDER — ARGININE/LYSINE/STERILE WATER 25-25MG/ML
1000 PLASTIC BAG, INJECTION (ML) INTRAVENOUS ONCE
OUTPATIENT
Start: 2024-06-07

## 2024-06-07 RX ORDER — DEXAMETHASONE SODIUM PHOSPHATE 10 MG/ML
10 INJECTION, SOLUTION INTRAMUSCULAR; INTRAVENOUS ONCE
OUTPATIENT
Start: 2024-06-07

## 2024-06-07 RX ORDER — ONDANSETRON 2 MG/ML
8 INJECTION INTRAMUSCULAR; INTRAVENOUS ONCE
OUTPATIENT
Start: 2024-06-07

## 2024-06-07 RX ORDER — OCTREOTIDE ACETATE 500 UG/ML
300 INJECTION, SOLUTION INTRAVENOUS; SUBCUTANEOUS ONCE
OUTPATIENT
Start: 2024-06-07

## 2024-06-07 RX ORDER — PROCHLORPERAZINE MALEATE 10 MG
10 TABLET ORAL ONCE
OUTPATIENT
Start: 2024-06-07

## 2024-06-07 NOTE — PROGRESS NOTES
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 Path of pancreatic head mass and periportal lymph node both consistent with well differentiated, grade 2 neuroendocrine tumor, Ki-67 3%  1/31/24: Dotatate PET with avid pancreatic head mass, diffuse lymph node metastases of left supraclavicular region, mediastinum, hilum, retroperitoneum and mesentery  2/14/24: C1 lanreotide  3/13/24: C2 lanreotide  4/12/24: C3 lanreotide  4/23/24: s/p liver biopsy with Dr Magdaleno, path positive for primary biliary cholangitis  5/9/24: C4 lanreotide  5/17/24: CT enterography with disease progression in pancreas, abdominal/retroperitoneal adenopathy    History of Present Illness:      43 y/o F PMH 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    - tolerating lanreotide well  - recently started on ursodiol for new dx of primary biliary cholangitis  - reports her previous abdominal discomfort is less now  - EGD did not show any GI bleeding    Past Medical History:  Past Medical History:    Abdominal pain    Blood disorder    ANEMIA    Blood in the stool    Cancer (HCC)    pancreas     Constipation    Disorder of liver    elevated enzymes    Easy bruising    Flatulence/gas pain/belching    Headache disorder    Heartburn    Heavy menses    Hemorrhoids    Indigestion    Irregular bowel habits    Itch of skin    Leg swelling    Menses painful    Nausea    Stool incontinence    Stress    Uncomfortable fullness after meals    Visual impairment    reading glasses    Vomiting    Wears glasses    Weight loss     Past Surgical History:   Procedure Laterality Date    Needle biopsy left      Tubal ligation         Home Medications:  No outpatient medications have been marked as taking for the 6/7/24 encounter (Office Visit) with Eduar Alfred MD.       Allergies:   No Known Allergies    Psychosocial History:  Social History     Socioeconomic History    Marital status:      Spouse name: Not on file    Number of children: Not on file    Years of education: Not on file    Highest education level: Not on file   Occupational History    Not on file   Tobacco Use    Smoking status: Never    Smokeless tobacco: Never   Vaping Use    Vaping status: Never Used   Substance and Sexual Activity    Alcohol use: Not Currently    Drug use: Never    Sexual activity: Not on file   Other Topics Concern    Not on file   Social History Narrative    Not on file     Social Determinants of Health     Financial Resource Strain: Not on file   Food Insecurity: Food Insecurity Present (2/9/2024)    [facility], [facility]    Food Insecurity     Currently or in the past 3 months, have you worried your food would run out before you had money to buy more?: Yes     In the past 12 months, have you run out of food or been unable to get more?: No   Transportation Needs: No Transportation Needs (2/9/2024)    [facility], [facility]    Transportation Needs     Currently or in the past 3 months, has lack of transportation kept you from  medical appointments, getting food or medicine, or providing care to a family member?: Not on file     : Not on file     Medical Transportation Needs?: No     Daily Living Transportation Needs? [Peds Only] : Not on file   Physical Activity: Not on file   Stress: Not on file   Social Connections: Unknown (3/13/2021)    [facility], [facility]    Social Connections     Conversations with friends/family/neighbors per week: Not on file   Housing Stability: Low Risk  (11/10/2023)    [facility] from [facility], [facility]    Housing Stability     Mortgage Payment Concerns?: Not on file     Number of Places Lived in the Last Year: Not on file     Unstable Housing?: Not on file       Family Medical History:  No family history on file.    Review of Systems:  A 10-point ROS was done with pertinent positives and negative per the HPI    Vital Signs:  Height: 152.4 cm (5') (06/07 0912)  Weight: 65.5 kg (144 lb 6.4 oz) (06/07 0912)  BSA (Calculated - sq m): 1.63 sq meters (06/07 0912)  Pulse: 72 (06/07 0912)  BP: 111/74 (06/07 0912)  Temp: 98 °F (36.7 °C) (06/07 0912)  Do Not Use - Resp Rate: --  SpO2: 96 % (06/07 0912)    Wt Readings from Last 6 Encounters:   06/07/24 65.5 kg (144 lb 6.4 oz)   05/10/24 65.3 kg (144 lb)   04/29/24 68.5 kg (151 lb)   04/12/24 68.5 kg (151 lb)   04/12/24 68.5 kg (151 lb)   03/18/24 68.5 kg (151 lb)     Physical Examination:  General: Patient is alert and oriented, not in acute distress  Psych: Mood and affect are appropriate  Eyes: EOMI, PERRL  ENT: Oropharynx is clear, no adenopathy  CV: no LE edema  Respiratory: Non-labored respirations  GI/Abd: Soft, non-tender  Neurological: Grossly intact   Lymphatics: No palpable inguinal lymphadenopathy  Skin: no rashes or petechiae    Laboratory:  Lab Results   Component Value Date    WBC 5.8 06/07/2024    WBC 9.5 05/10/2024    WBC 8.7 04/12/2024    HGB 10.6 (L)  06/07/2024    HGB 11.3 (L) 05/10/2024    HGB 12.0 04/12/2024    HCT 31.8 (L) 06/07/2024    MCV 84.8 06/07/2024    MCH 28.3 06/07/2024    MCHC 33.3 06/07/2024    RDW 14.9 06/07/2024    .0 06/07/2024    .0 05/10/2024    .0 04/12/2024     Lab Results   Component Value Date     (H) 06/07/2024    BUN 7 (L) 06/07/2024    CREATSERUM 0.58 06/07/2024    CREATSERUM 0.62 05/10/2024    CREATSERUM 0.73 04/12/2024    ANIONGAP 8 06/07/2024    CA 8.7 06/07/2024    OSMOCALC 288 06/07/2024    ALKPHO 205 (H) 06/07/2024    AST 36 06/07/2024    ALT 37 06/07/2024    BILT 0.6 06/07/2024    TP 7.9 06/07/2024    ALB 3.4 06/07/2024    GLOBULIN 4.5 (H) 06/07/2024     06/07/2024    K 3.9 06/07/2024     06/07/2024    CO2 25.0 06/07/2024     Lab Results   Component Value Date    INR 1.05 04/23/2024         Impression & Plan:     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, grade 2  - metastatic to liver and diffuse lymphadenopathy (ki-67 3%)  - s/p metal biliary stent placement 1/10/24  - baseline chromogranin A 229  - previously discussed incurable prognosis and palliative intent of treatment  - progressed on 1L single agent lanreotide on Ct enterography A/P  - discussed plan to transition to 2L Lutathera; we discussed logistics and side effects of treatment including but not limited to low blood counts, fatigue, diarrhea     Iron deficiency anemia  - s/p IV iron dextran 2/14/24  - persistent anemic. recheck iron studies; plan for IV iron repletion again if deficient    Primary biliary cholangitis  - on ursodiol; following with Dr Magdaleno    F/u: pending Lutathera set up at [facility]  Hematology/Medical Oncology  [facility]

## 2024-06-07 NOTE — TELEPHONE ENCOUNTER
Language Line, Maltese, Tomás [phone]    Last Lantreotide administered on 5/10/24.      Discussed logistics of Lutathera treatment with patient.  4 cycles completed at [facility].  All other appointments will be coordinated in [place].      Nuc Med Consult scheduled on 6/13/24 at 10 am  Lutathera education on 6/13/24 at 11 am    Once I know dose availability, I will contact patient.     Encouraged to call the GI Navigator line prn.

## 2024-06-07 NOTE — PROGRESS NOTES
Education Record    Learner:  Patient    Disease / Diagnosis: Pancreatic Neuroendocrine     Barriers / Limitations:  None   Comments:    Method:  Discussion   Comments:    General Topics:  Medication and Plan of care reviewed   Comments:    Outcome:  Shows understanding   Comments:    Here for follow up. Had CT scan. Still having some intermittent pain in her LUQ.

## 2024-06-10 ENCOUNTER — TELEPHONE (OUTPATIENT)
Dept: HEMATOLOGY/ONCOLOGY | Facility: HOSPITAL | Age: 44
End: 2024-06-10

## 2024-06-11 ENCOUNTER — TELEPHONE (OUTPATIENT)
Dept: HEMATOLOGY/ONCOLOGY | Facility: HOSPITAL | Age: 44
End: 2024-06-11

## 2024-06-11 LAB — CHROMOGRANIN A: 202.4 NG/ML

## 2024-06-11 NOTE — TELEPHONE ENCOUNTER
Ezequiel Teixeira [phone]  I need to reschedule my appointment for  6/18/24 @ 9:45 and 10:00 . Thanks Jaclyn

## 2024-06-12 ENCOUNTER — TELEPHONE (OUTPATIENT)
Dept: HEMATOLOGY/ONCOLOGY | Facility: HOSPITAL | Age: 44
End: 2024-06-12

## 2024-06-12 NOTE — TELEPHONE ENCOUNTER
AZIZA Branham [phone] Aline    Called patient to review future appointments.  LMOVM.  Will discuss at education appointment on 6/13/24.

## 2024-06-13 ENCOUNTER — OFFICE VISIT (OUTPATIENT)
Dept: HEMATOLOGY/ONCOLOGY | Facility: HOSPITAL | Age: 44
End: 2024-06-13
Attending: PHYSICIAN ASSISTANT
Payer: COMMERCIAL

## 2024-06-13 ENCOUNTER — HOSPITAL ENCOUNTER (OUTPATIENT)
Dept: NUCLEAR MEDICINE | Facility: HOSPITAL | Age: 44
Discharge: HOME | End: 2024-06-13
Attending: PHYSICIAN ASSISTANT
Payer: COMMERCIAL

## 2024-06-13 DIAGNOSIS — Z51.81 ENCOUNTER FOR MONITORING CARDIOTOXIC DRUG THERAPY: ICD-10-CM

## 2024-06-13 DIAGNOSIS — D3A.8 PRIMARY PANCREATIC NEUROENDOCRINE TUMOR (HCC): Primary | ICD-10-CM

## 2024-06-13 DIAGNOSIS — Z79.899 ENCOUNTER FOR MONITORING CARDIOTOXIC DRUG THERAPY: ICD-10-CM

## 2024-06-13 DIAGNOSIS — C7B.8 NEUROENDOCRINE CARCINOMA METASTATIC TO INTRA-ABDOMINAL LYMPH NODE (HCC): ICD-10-CM

## 2024-06-13 DIAGNOSIS — C7A.8 PRIMARY MALIGNANT NEUROENDOCRINE TUMOR OF PANCREAS (HCC): ICD-10-CM

## 2024-06-13 DIAGNOSIS — C7A.8 NEUROENDOCRINE CARCINOMA METASTATIC TO INTRA-ABDOMINAL LYMPH NODE (HCC): ICD-10-CM

## 2024-06-13 DIAGNOSIS — C7B.8 METASTATIC MALIGNANT NEUROENDOCRINE TUMOR TO LIVER (HCC): ICD-10-CM

## 2024-06-13 PROCEDURE — 99215 OFFICE O/P EST HI 40 MIN: CPT | Performed by: PHYSICIAN ASSISTANT

## 2024-06-13 RX ORDER — PROCHLORPERAZINE MALEATE 10 MG
10 TABLET ORAL EVERY 6 HOURS PRN
Qty: 60 TABLET | Refills: 1 | Status: SHIPPED | OUTPATIENT
Start: 2024-06-13

## 2024-06-13 NOTE — PROGRESS NOTES
Medication Education Record: IV Therapy    Learner:  Patient, Spouse, and Family Member    Barriers / Limitations:  Language    Psychosocial Assessment:  patient psychosocial response appropriate    Diagnosis:   Neuroendocrine carcinoma    IV Cancer Treatment Name(s): Lutathera  IV Cancer Treatment Frequency Once every 8 weeks    Number of cycles planned 4 cycles  Plan for appointments and lab testing Every 2-4 weeks  Verified Consent to Chemotherapy/Biotherapy Cancer Treatment form signed by patient and provider:  Yes    Confirm patient informs his/her Cancer Care team of any treatment received in a setting other than at the [facility] (such as inpatient or outpatient at another hospital or clinic, locally or out of state) so that this medical information can accurately be reflected in his/her medical record.  This vital information will provide an accurate picture for the physician prescribing the current cancer treatment.Yes  Cancer Treatment Side Effects (refer to Chemo Care Handouts for further information):  Allergic reactions  Constipation  Diarrhea  Eye disorders  Fatigue  Hair loss  Heart effects  Kidney / Bladder effects  Liver effects  Loss of appetite  Low red blood cell count / Anemia  Low White Blood Cell Count/Risk of infection  Low Platelet Count/Risk of Bleeding  Lung Effects  Mouth or Throat Sores  Muscle / Bone Effects  Nausea / Vomiting  Nerve Effects  Reproductive / Fertility Effects:      I was informed that certain therapies may cause reproductive changes that can    result in infertility.      I was informed that it is necessary to avoid pregnancy during treatment and for six months time after therapy.  I understand that birth control methods should be used if applicable.  Secondary Malignancies  Skin Effects  Taste Changes    IV administration risks:  Potential leaking of drug outside of vein during administration   Signs/symptoms include redness, swelling, pain, burning  at the site of administration  Notify Infusion Nurse immediately if any of these symptoms occur during or after the infusion  Allergic reaction: there is a chance for allergic reaction with some medications.  If your prescribed therapy has a higher risk for this, steps will be taken to prevent and minimize this from occurring.    Recommended Anti-nausea medications (as directed by your provider):  Prochloperazine (Compazine) 10 mg every 6 hours    Note:  Tubal ligation    Other drug specific:   Octreotide injection:  Once every 4 weeks at [place] (per insurance company)  Helpful hints during cancer treatment:    Diet:  Avoid greasy or spicy foods on days surrounding chemotherapy  Eat small frequent meals per day (6-7 meals) rather than 3 large meals  Choose high calorie/high protein foods (chicken, hard cooked eggs, peanut butter, cheese)  If nauseated, try dry foods, such as toast, crackers or pretzels; light or bland foods, such as applesauce or oatmeal.    Fluid intake:  Drink 8-10 cups of liquid a day and take a water bottle wherever you go.  Any fluid is acceptable, but caffeinated products do not count towards your intake and should be limited to 1-2 drinks/day.    Physical Activity    If your doctor approves, be as physically active as you can, but start out slowly, and increase your activity over time as you feel stronger.  Listen to your body and rest when you need to.  Do what you can when you feel up to it.      Oral Care  Keep your mouth clean.  Rinse your mouth before and after meals with plain water or with a mild mouth rinse (made with 1 quart water, 1 teaspoon salt, and 1 teaspoon baking soda, shake before using)   Avoid commercial mouthwashes that contain alcohol, alcoholic or acidic drinks or tobacco  An acceptable mouthwash is Biotene®   If soreness or sores develop, contact the office.    Diarrhea or Constipation    Imodium A-D (Loperamide) use for diarrhea:  Take 2 tabs (4mg) at the first sign  of diarrhea; then take 1 tab (2mg) every 2 hours until you have had no diarrhea for at least 12 hours; during the night take 2 tabs (4mg) every 4 hours as needed.  Maximum of 8 tablets in 24 hours.   Constipation:  Over-the-counter recommendations include:  Senokot, Ducolax, Milk of Magnesia or Miralax (generics are ok)  If you have persistent diarrhea or constipation, please contact the triage nurse for further instructions.  Skin Care  Avoid direct sunlight.  Wear a broad-spectrum sunscreen with an SPF of 30 or higher on any skin exposed to the sun.  Re-apply every 2 hours if in the sun and after bathing or sweating.  For dry skin, use an alcohol-free lotion twice per day, especially after baths.  If scalp hair loss has occurred, protect the scalp from the sun by wearing a hat and use sunscreen.  Apply alcohol-free moisturizer as needed.    When to call the doctor:  Fever of 100.5 or greater or shaking chills  Nausea/vomiting not controlled with anti-nausea medications: Unable to drink for 24 hours or have signs of dehydration: tiredness, thirst, dry mouth, dark and decreased amount of urine  Diarrhea - not controlled with Imodium AD or more than 6 episodes in 24 hours  Constipation -no bowel movement x 3 days, no response to stool softeners or laxatives  Mouth sores, sore throat or blisters on the lips affecting oral intake  Difficulty breathing, chest pain, or new cough  Excessive tiredness or weakness, confusion or loss of balance  New rash  Tingling or burning, redness, swelling of the palms of hands or soles of feet  Sudden new unexpected symptom -such as change in vision, swelling in arm or leg  Increase in numbness and tingling of hands or feet  Unusual bleeding (nose bleeds, blood in urine, stool or phlegm)  Pain with urination  Persistent mood changes, depression, nervousness, difficulty sleeping   Pain, redness, swelling or blistering at the IV site  If you go to Emergency Room for any reason or seek  medical attention elsewhere  If you should need to cancel or reschedule any visit, it is important that you contact the office    What Phone Number to Call:  Cancer Center [phone] / Triage Nurse    Cancer treatment education, including treatment plan, supportive medications, and post-treatment care was provided to the patient.  The patient/support person  was attentive during education, verbalized understanding, all questions were answered and patient was instructed to call with further questions.     Reinforcement of education is needed at next visit? Yes  Include language provided and  information:  Kristin Branham [phone], Carlo [phone]  60 minutes appointment with at minimum 50% time spent counseling/coordinating care

## 2024-06-13 NOTE — PATIENT INSTRUCTIONS
Medication Education Record: IV Therapy    Learner:  Patient, Spouse, and Family Member    Barriers / Limitations:  Language    Psychosocial Assessment:  patient psychosocial response appropriate    Diagnosis:   Neuroendocrine carcinoma    IV Cancer Treatment Name(s): Lutathera  IV Cancer Treatment Frequency Once every 8 weeks    Number of cycles planned 4 cycles  Plan for appointments and lab testing Every 2-4 weeks  Verified Consent to Chemotherapy/Biotherapy Cancer Treatment form signed by patient and provider:  Yes    Confirm patient informs his/her Cancer Care team of any treatment received in a setting other than at the [facility] (such as inpatient or outpatient at another hospital or clinic, locally or out of state) so that this medical information can accurately be reflected in his/her medical record.  This vital information will provide an accurate picture for the physician prescribing the current cancer treatment.Yes  Cancer Treatment Side Effects (refer to Chemo Care Handouts for further information):  Allergic reactions  Constipation  Diarrhea  Eye disorders  Fatigue  Hair loss  Heart effects  Kidney / Bladder effects  Liver effects  Loss of appetite  Low red blood cell count / Anemia  Low White Blood Cell Count/Risk of infection  Low Platelet Count/Risk of Bleeding  Lung Effects  Mouth or Throat Sores  Muscle / Bone Effects  Nausea / Vomiting  Nerve Effects  Reproductive / Fertility Effects:      I was informed that certain therapies may cause reproductive changes that can    result in infertility.      I was informed that it is necessary to avoid pregnancy during treatment and for six months time after therapy.  I understand that birth control methods should be used if applicable.  Secondary Malignancies  Skin Effects  Taste Changes    IV administration risks:  Potential leaking of drug outside of vein during administration   Signs/symptoms include redness, swelling, pain, burning  at the site of administration  Notify Infusion Nurse immediately if any of these symptoms occur during or after the infusion  Allergic reaction: there is a chance for allergic reaction with some medications.  If your prescribed therapy has a higher risk for this, steps will be taken to prevent and minimize this from occurring.    Recommended Anti-nausea medications (as directed by your provider):  Prochloperazine (Compazine) 10 mg every 6 hours    Other drug specific:   Octreotide injection:  Once every 4 weeks at [place] (per insurance company)  Helpful hints during cancer treatment:    Diet:  Avoid greasy or spicy foods on days surrounding chemotherapy  Eat small frequent meals per day (6-7 meals) rather than 3 large meals  Choose high calorie/high protein foods (chicken, hard cooked eggs, peanut butter, cheese)  If nauseated, try dry foods, such as toast, crackers or pretzels; light or bland foods, such as applesauce or oatmeal.    Fluid intake:  Drink 8-10 cups of liquid a day and take a water bottle wherever you go.  Any fluid is acceptable, but caffeinated products do not count towards your intake and should be limited to 1-2 drinks/day.    Physical Activity    If your doctor approves, be as physically active as you can, but start out slowly, and increase your activity over time as you feel stronger.  Listen to your body and rest when you need to.  Do what you can when you feel up to it.      Oral Care  Keep your mouth clean.  Rinse your mouth before and after meals with plain water or with a mild mouth rinse (made with 1 quart water, 1 teaspoon salt, and 1 teaspoon baking soda, shake before using)   Avoid commercial mouthwashes that contain alcohol, alcoholic or acidic drinks or tobacco  An acceptable mouthwash is Biotene®   If soreness or sores develop, contact the office.    Diarrhea or Constipation    Imodium A-D (Loperamide) use for diarrhea:  Take 2 tabs (4mg) at the first sign of diarrhea; then take 1  tab (2mg) every 2 hours until you have had no diarrhea for at least 12 hours; during the night take 2 tabs (4mg) every 4 hours as needed.  Maximum of 8 tablets in 24 hours.   Constipation:  Over-the-counter recommendations include:  Senokot, Ducolax, Milk of Magnesia or Miralax (generics are ok)  If you have persistent diarrhea or constipation, please contact the triage nurse for further instructions.  Skin Care  Avoid direct sunlight.  Wear a broad-spectrum sunscreen with an SPF of 30 or higher on any skin exposed to the sun.  Re-apply every 2 hours if in the sun and after bathing or sweating.  For dry skin, use an alcohol-free lotion twice per day, especially after baths.  If scalp hair loss has occurred, protect the scalp from the sun by wearing a hat and use sunscreen.  Apply alcohol-free moisturizer as needed.    When to call the doctor:  Fever of 100.5 or greater or shaking chills  Nausea/vomiting not controlled with anti-nausea medications: Unable to drink for 24 hours or have signs of dehydration: tiredness, thirst, dry mouth, dark and decreased amount of urine  Diarrhea - not controlled with Imodium AD or more than 6 episodes in 24 hours  Constipation -no bowel movement x 3 days, no response to stool softeners or laxatives  Mouth sores, sore throat or blisters on the lips affecting oral intake  Difficulty breathing, chest pain, or new cough  Excessive tiredness or weakness, confusion or loss of balance  New rash  Tingling or burning, redness, swelling of the palms of hands or soles of feet  Sudden new unexpected symptom -such as change in vision, swelling in arm or leg  Increase in numbness and tingling of hands or feet  Unusual bleeding (nose bleeds, blood in urine, stool or phlegm)  Pain with urination  Persistent mood changes, depression, nervousness, difficulty sleeping   Pain, redness, swelling or blistering at the IV site  If you go to Emergency Room for any reason or seek medical attention  elsewhere  If you should need to cancel or reschedule any visit, it is important that you contact the office    What Phone Number to Call:  Cancer Center [phone] / Triage Nurse

## 2024-06-18 ENCOUNTER — OFFICE VISIT (OUTPATIENT)
Dept: HEMATOLOGY/ONCOLOGY | Facility: HOSPITAL | Age: 44
End: 2024-06-18
Attending: INTERNAL MEDICINE

## 2024-06-18 VITALS
HEART RATE: 78 BPM | BODY MASS INDEX: 28 KG/M2 | SYSTOLIC BLOOD PRESSURE: 112 MMHG | OXYGEN SATURATION: 98 % | DIASTOLIC BLOOD PRESSURE: 76 MMHG | TEMPERATURE: 98 F | WEIGHT: 141 LBS

## 2024-06-18 VITALS — SYSTOLIC BLOOD PRESSURE: 95 MMHG | DIASTOLIC BLOOD PRESSURE: 61 MMHG

## 2024-06-18 DIAGNOSIS — D3A.8 PRIMARY PANCREATIC NEUROENDOCRINE TUMOR (HCC): Primary | ICD-10-CM

## 2024-06-18 DIAGNOSIS — D50.0 IRON DEFICIENCY ANEMIA DUE TO CHRONIC BLOOD LOSS: ICD-10-CM

## 2024-06-18 DIAGNOSIS — D50.0 IRON DEFICIENCY ANEMIA DUE TO CHRONIC BLOOD LOSS: Primary | ICD-10-CM

## 2024-06-18 LAB
ALBUMIN SERPL-MCNC: 3.6 G/DL (ref 3.4–5)
ALBUMIN/GLOB SERPL: 0.8 {RATIO} (ref 1–2)
ALP LIVER SERPL-CCNC: 279 U/L
ALT SERPL-CCNC: 75 U/L
ANION GAP SERPL CALC-SCNC: 9 MMOL/L (ref 0–18)
AST SERPL-CCNC: 55 U/L (ref 15–37)
BASOPHILS # BLD AUTO: 0.03 X10(3) UL (ref 0–0.2)
BASOPHILS NFR BLD AUTO: 0.6 %
BILIRUB SERPL-MCNC: 0.6 MG/DL (ref 0.1–2)
BUN BLD-MCNC: 7 MG/DL (ref 9–23)
CALCIUM BLD-MCNC: 8.9 MG/DL (ref 8.5–10.1)
CHLORIDE SERPL-SCNC: 106 MMOL/L (ref 98–112)
CO2 SERPL-SCNC: 22 MMOL/L (ref 21–32)
CREAT BLD-MCNC: 0.67 MG/DL
EGFRCR SERPLBLD CKD-EPI 2021: 109 ML/MIN/1.73M2 (ref 60–?)
EOSINOPHIL # BLD AUTO: 0.18 X10(3) UL (ref 0–0.7)
EOSINOPHIL NFR BLD AUTO: 3.4 %
ERYTHROCYTE [DISTWIDTH] IN BLOOD BY AUTOMATED COUNT: 15.2 %
FASTING STATUS PATIENT QL REPORTED: NO
GLOBULIN PLAS-MCNC: 4.5 G/DL (ref 2.8–4.4)
GLUCOSE BLD-MCNC: 137 MG/DL (ref 70–99)
HCG UR QL: NEGATIVE
HCT VFR BLD AUTO: 32.9 %
HGB BLD-MCNC: 11 G/DL
IMM GRANULOCYTES # BLD AUTO: 0.03 X10(3) UL (ref 0–1)
IMM GRANULOCYTES NFR BLD: 0.6 %
LYMPHOCYTES # BLD AUTO: 0.59 X10(3) UL (ref 1–4)
LYMPHOCYTES NFR BLD AUTO: 11 %
MCH RBC QN AUTO: 28.5 PG (ref 26–34)
MCHC RBC AUTO-ENTMCNC: 33.4 G/DL (ref 31–37)
MCV RBC AUTO: 85.2 FL
MONOCYTES # BLD AUTO: 0.43 X10(3) UL (ref 0.1–1)
MONOCYTES NFR BLD AUTO: 8.1 %
NEUTROPHILS # BLD AUTO: 4.08 X10 (3) UL (ref 1.5–7.7)
NEUTROPHILS # BLD AUTO: 4.08 X10(3) UL (ref 1.5–7.7)
NEUTROPHILS NFR BLD AUTO: 76.3 %
OSMOLALITY SERPL CALC.SUM OF ELEC: 284 MOSM/KG (ref 275–295)
PLATELET # BLD AUTO: 194 10(3)UL (ref 150–450)
POTASSIUM SERPL-SCNC: 4 MMOL/L (ref 3.5–5.1)
PROT SERPL-MCNC: 8.1 G/DL (ref 6.4–8.2)
RBC # BLD AUTO: 3.86 X10(6)UL
SODIUM SERPL-SCNC: 137 MMOL/L (ref 136–145)
WBC # BLD AUTO: 5.3 X10(3) UL (ref 4–11)

## 2024-06-18 PROCEDURE — G2211 COMPLEX E/M VISIT ADD ON: HCPCS | Performed by: INTERNAL MEDICINE

## 2024-06-18 PROCEDURE — 99215 OFFICE O/P EST HI 40 MIN: CPT | Performed by: INTERNAL MEDICINE

## 2024-06-18 PROCEDURE — 96365 THER/PROPH/DIAG IV INF INIT: CPT

## 2024-06-18 RX ORDER — ARGININE/LYSINE/STERILE WATER 25-25MG/ML
1000 PLASTIC BAG, INJECTION (ML) INTRAVENOUS ONCE
Status: CANCELLED | OUTPATIENT
Start: 2024-06-20

## 2024-06-18 RX ORDER — ONDANSETRON 2 MG/ML
8 INJECTION INTRAMUSCULAR; INTRAVENOUS ONCE
Status: CANCELLED | OUTPATIENT
Start: 2024-06-20

## 2024-06-18 RX ORDER — OCTREOTIDE ACETATE 500 UG/ML
300 INJECTION, SOLUTION INTRAVENOUS; SUBCUTANEOUS ONCE
OUTPATIENT
Start: 2024-06-20

## 2024-06-18 RX ORDER — DEXAMETHASONE SODIUM PHOSPHATE 10 MG/ML
10 INJECTION, SOLUTION INTRAMUSCULAR; INTRAVENOUS ONCE
OUTPATIENT
Start: 2024-06-20

## 2024-06-18 RX ORDER — PROCHLORPERAZINE MALEATE 10 MG
10 TABLET ORAL ONCE
OUTPATIENT
Start: 2024-06-20

## 2024-06-18 NOTE — PROGRESS NOTES
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 Path of pancreatic head mass and periportal lymph node both consistent with well differentiated, grade 2 neuroendocrine tumor, Ki-67 3%  1/31/24: Dot[facility] PET with avid pancreatic head mass, diffuse lymph node metastases of left supraclavicular region, mediastinum, hilum, retroperitoneum and mesentery  2/14/24: C1 lanreotide  3/13/24: C2 lanreotide  4/12/24: C3 lanreotide  4/23/24: s/p liver biopsy with Dr Magdaleno, path positive for primary biliary cholangitis  5/9/24: C4 lanreotide  5/17/24: CT enterography with disease progression in pancreas, abdominal/retroperitoneal adenopathy    History of Present Illness:      43 y/o F PMH 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    - she is set up for her first dose of Lutathera on 6/21  - reports some mild fatigue and dizziness  - she was happy with how things were explained to her at [facility]    Past Medical History:  Past Medical History:    Abdominal pain    Blood disorder    ANEMIA    Blood in the stool    Cancer (HCC)    pancreas    Constipation    Disorder of liver     elevated enzymes    Easy bruising    Flatulence/gas pain/belching    Headache disorder    Heartburn    Heavy menses    Hemorrhoids    Indigestion    Irregular bowel habits    Itch of skin    Leg swelling    Menses painful    Nausea    Stool incontinence    Stress    Uncomfortable fullness after meals    Visual impairment    reading glasses    Vomiting    Wears glasses    Weight loss     Past Surgical History:   Procedure Laterality Date    Needle biopsy left      Tubal ligation         Home Medications:  No outpatient medications have been marked as taking for the 6/18/24 encounter (Appointment) with Eduar Alfred MD.       Allergies:   No Known Allergies    Psychosocial History:  Social History     Socioeconomic History    Marital status:      Spouse name: Not on file    Number of children: Not on file    Years of education: Not on file    Highest education level: Not on file   Occupational History    Not on file   Tobacco Use    Smoking status: Never    Smokeless tobacco: Never   Vaping Use    Vaping status: Never Used   Substance and Sexual Activity    Alcohol use: Not Currently    Drug use: Never    Sexual activity: Not on file   Other Topics Concern    Not on file   Social History Narrative    Not on file     Social Determinants of Health     Financial Resource Strain: Not on file   Food Insecurity: Food Insecurity Present (2/9/2024)    [facility], [facility]    Food Insecurity     Currently or in the past 3 months, have you worried your food would run out before you had money to buy more?: Yes     In the past 12 months, have you run out of food or been unable to get more?: No   Transportation Needs: No Transportation Needs (2/9/2024)    [facility], [facility]    Transportation Needs     Currently or in the past 3 months, has lack of transportation kept you from medical appointments, getting food  or medicine, or providing care to a family member?: Not on file     : Not on file     Medical Transportation Needs?: No     Daily Living Transportation Needs? [Peds Only] : Not on file   Physical Activity: Not on file   Stress: Not on file   Social Connections: Unknown (3/13/2021)    [facility] from [facility], [facility]    Social Connections     Conversations with friends/family/neighbors per week: Not on file   Housing Stability: Low Risk  (11/10/2023)    Received from [facility], [facility]    Housing Stability     Mortgage Payment Concerns?: Not on file     Number of Places Lived in the Last Year: Not on file     Unstable Housing?: Not on file       Family Medical History:  No family history on file.    Review of Systems:  A 10-point ROS was done with pertinent positives and negative per the HPI    Vital Signs:  Height: --  Weight: --  BSA (Calculated - sq m): --  Pulse: --  BP: --  Temp: --  Do Not Use - Resp Rate: --  SpO2: --    Wt Readings from Last 6 Encounters:   06/07/24 65.5 kg (144 lb 6.4 oz)   05/10/24 65.3 kg (144 lb)   04/29/24 68.5 kg (151 lb)   04/12/24 68.5 kg (151 lb)   04/12/24 68.5 kg (151 lb)   03/18/24 68.5 kg (151 lb)     Physical Examination:  General: Patient is alert and oriented, not in acute distress  Psych: Mood and affect are appropriate  Eyes: EOMI, PERRL  ENT: Oropharynx is clear, no adenopathy  CV: no LE edema  Respiratory: Non-labored respirations  GI/Abd: Soft, non-tender  Neurological: Grossly intact   Lymphatics: No palpable inguinal lymphadenopathy  Skin: no rashes or petechiae    Laboratory:  Lab Results   Component Value Date    WBC 5.8 06/07/2024    WBC 9.5 05/10/2024    WBC 8.7 04/12/2024    HGB 10.6 (L) 06/07/2024    HGB 11.3 (L) 05/10/2024    HGB 12.0 04/12/2024    HCT 31.8 (L) 06/07/2024    MCV 84.8 06/07/2024    MCH 28.3 06/07/2024    MCHC 33.3 06/07/2024    RDW 14.9 06/07/2024    .0  06/07/2024    .0 05/10/2024    .0 04/12/2024     Lab Results   Component Value Date     (H) 06/07/2024    BUN 7 (L) 06/07/2024    CREATSERUM 0.58 06/07/2024    CREATSERUM 0.62 05/10/2024    CREATSERUM 0.73 04/12/2024    ANIONGAP 8 06/07/2024    CA 8.7 06/07/2024    OSMOCALC 288 06/07/2024    ALKPHO 205 (H) 06/07/2024    AST 36 06/07/2024    ALT 37 06/07/2024    BILT 0.6 06/07/2024    TP 7.9 06/07/2024    ALB 3.4 06/07/2024    GLOBULIN 4.5 (H) 06/07/2024     06/07/2024    K 3.9 06/07/2024     06/07/2024    CO2 25.0 06/07/2024     Lab Results   Component Value Date    INR 1.05 04/23/2024         Impression & Plan:     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, grade 2  - metastatic to liver and diffuse lymphadenopathy (ki-67 3%)  - s/p metal biliary stent placement 1/10/24  - baseline chromogranin A 229  - previously discussed incurable prognosis and palliative intent of treatment  - progressed on 1L single agent lanreotide on Ct enterography A/P  - she is planned for C1 Lutathera 6/21/24  - labs ok, proceed with C1     Iron deficiency anemia  - s/p IV iron dextran 2/14/24  - repeat iron deficiency. Plan for repeat IV infed today 6/18/24. Plan to recheck iron labs in ~9/18/24    Primary biliary cholangitis  - on ursodiol; following with Dr Magdaleno    F/u: 1 month    Eduar Alfred  Hematology/Medical Oncology  [facility]

## 2024-06-18 NOTE — PROGRESS NOTES
Patient here for follow-up and planned IV iron infusion. States energy levels are low. She is feeling lightheaded at times. Denies any additional symptoms. Will see APN on Friday 6/21/24 for treatment.

## 2024-06-18 NOTE — PROGRESS NOTES
Education Record    Learner:  Patient and Family Member    Disease / Diagnosis: pancreatic neuroendocrine tumor    Barriers / Limitations:  None   Comments:    Method:  Discussion   Comments:    General Topics:  Medication, Side effects and symptom management, Plan of care reviewed, and Fall risk and prevention   Comments:    Outcome:  Shows understanding   Comments:    Infed iron infusion tolerated well. Pt discharged ambulatory in stable condition with her son.

## 2024-06-19 ENCOUNTER — TELEPHONE (OUTPATIENT)
Dept: HEMATOLOGY/ONCOLOGY | Facility: HOSPITAL | Age: 44
End: 2024-06-19

## 2024-06-19 ENCOUNTER — EKG ENCOUNTER (OUTPATIENT)
Dept: LAB | Age: 44
End: 2024-06-19
Attending: PHYSICIAN ASSISTANT

## 2024-06-19 DIAGNOSIS — Z51.81 ENCOUNTER FOR MONITORING CARDIOTOXIC DRUG THERAPY: ICD-10-CM

## 2024-06-19 DIAGNOSIS — Z79.899 ENCOUNTER FOR MONITORING CARDIOTOXIC DRUG THERAPY: ICD-10-CM

## 2024-06-19 DIAGNOSIS — D3A.8 PRIMARY PANCREATIC NEUROENDOCRINE TUMOR (HCC): ICD-10-CM

## 2024-06-19 PROCEDURE — 93010 ELECTROCARDIOGRAM REPORT: CPT | Performed by: INTERNAL MEDICINE

## 2024-06-19 PROCEDURE — 93005 ELECTROCARDIOGRAM TRACING: CPT

## 2024-06-19 NOTE — TELEPHONE ENCOUNTER
Noted a missed call on the GI Navigator Line.  Called patient.  She could not get her EKG completed in [facility] so she scheduled at  today.  No other questions    AZIZA Fam [phone]

## 2024-06-20 LAB
ATRIAL RATE: 75 BPM
P AXIS: 30 DEGREES
P-R INTERVAL: 150 MS
Q-T INTERVAL: 402 MS
QRS DURATION: 70 MS
QTC CALCULATION (BEZET): 448 MS
R AXIS: 5 DEGREES
T AXIS: 28 DEGREES
VENTRICULAR RATE: 75 BPM

## 2024-06-21 ENCOUNTER — HOSPITAL ENCOUNTER (OUTPATIENT)
Dept: NUCLEAR MEDICINE | Facility: HOSPITAL | Age: 44
Discharge: HOME | End: 2024-06-21
Attending: PHYSICIAN ASSISTANT
Payer: COMMERCIAL

## 2024-06-21 ENCOUNTER — OFFICE VISIT (OUTPATIENT)
Dept: HEMATOLOGY/ONCOLOGY | Facility: HOSPITAL | Age: 44
End: 2024-06-21
Attending: INTERNAL MEDICINE

## 2024-06-21 VITALS
WEIGHT: 139.19 LBS | DIASTOLIC BLOOD PRESSURE: 71 MMHG | BODY MASS INDEX: 27 KG/M2 | HEART RATE: 76 BPM | SYSTOLIC BLOOD PRESSURE: 108 MMHG | RESPIRATION RATE: 16 BRPM | TEMPERATURE: 98 F

## 2024-06-21 DIAGNOSIS — C7B.8 NEUROENDOCRINE CARCINOMA METASTATIC TO INTRA-ABDOMINAL LYMPH NODE (HCC): ICD-10-CM

## 2024-06-21 DIAGNOSIS — C7A.8 NEUROENDOCRINE CARCINOMA METASTATIC TO INTRA-ABDOMINAL LYMPH NODE (HCC): ICD-10-CM

## 2024-06-21 DIAGNOSIS — C7A.8 PRIMARY MALIGNANT NEUROENDOCRINE TUMOR OF PANCREAS (HCC): ICD-10-CM

## 2024-06-21 DIAGNOSIS — C7B.8 METASTATIC MALIGNANT NEUROENDOCRINE TUMOR TO LIVER (HCC): ICD-10-CM

## 2024-06-21 DIAGNOSIS — C7B.8 METASTATIC MALIGNANT NEUROENDOCRINE TUMOR TO LIVER (HCC): Primary | ICD-10-CM

## 2024-06-21 PROCEDURE — 96401 CHEMO ANTI-NEOPL SQ/IM: CPT

## 2024-06-21 PROCEDURE — 96366 THER/PROPH/DIAG IV INF ADDON: CPT

## 2024-06-21 PROCEDURE — 96374 THER/PROPH/DIAG INJ IV PUSH: CPT

## 2024-06-21 PROCEDURE — 96365 THER/PROPH/DIAG IV INF INIT: CPT

## 2024-06-21 PROCEDURE — 79101 NUCLEAR RX IV ADMIN: CPT | Performed by: PHYSICIAN ASSISTANT

## 2024-06-21 RX ORDER — ONDANSETRON 2 MG/ML
8 INJECTION INTRAMUSCULAR; INTRAVENOUS ONCE
OUTPATIENT
Start: 2024-06-22

## 2024-06-21 RX ORDER — ONDANSETRON 2 MG/ML
INJECTION INTRAMUSCULAR; INTRAVENOUS
Status: COMPLETED
Start: 2024-06-21 | End: 2024-06-21

## 2024-06-21 RX ORDER — OCTREOTIDE ACETATE 500 UG/ML
300 INJECTION, SOLUTION INTRAVENOUS; SUBCUTANEOUS ONCE
OUTPATIENT
Start: 2024-06-22

## 2024-06-21 RX ORDER — ARGININE/LYSINE/STERILE WATER 25-25MG/ML
1000 PLASTIC BAG, INJECTION (ML) INTRAVENOUS ONCE
Status: COMPLETED | OUTPATIENT
Start: 2024-06-21 | End: 2024-06-21

## 2024-06-21 RX ORDER — ONDANSETRON 2 MG/ML
8 INJECTION INTRAMUSCULAR; INTRAVENOUS ONCE
Status: COMPLETED | OUTPATIENT
Start: 2024-06-21 | End: 2024-06-21

## 2024-06-21 RX ORDER — PROCHLORPERAZINE MALEATE 10 MG
10 TABLET ORAL ONCE
OUTPATIENT
Start: 2024-06-22

## 2024-06-21 RX ORDER — ARGININE/LYSINE/STERILE WATER 25-25MG/ML
1000 PLASTIC BAG, INJECTION (ML) INTRAVENOUS ONCE
OUTPATIENT
Start: 2024-06-22

## 2024-06-21 RX ORDER — DEXAMETHASONE SODIUM PHOSPHATE 10 MG/ML
10 INJECTION, SOLUTION INTRAMUSCULAR; INTRAVENOUS ONCE
OUTPATIENT
Start: 2024-06-22

## 2024-06-21 RX ADMIN — ARGININE/LYSINE/STERILE WATER 1000 ML: 25-25MG/ML PLASTIC BAG, INJECTION (ML) INTRAVENOUS at 08:55:00

## 2024-06-21 RX ADMIN — ONDANSETRON 8 MG: 2 INJECTION INTRAMUSCULAR; INTRAVENOUS at 08:29:00

## 2024-06-21 NOTE — PROGRESS NOTES
Pt to infusion center for Lutathera treatment. This is tx one of four  PIV established x2.    Antiemetic administered and pt transported to [facility] by staff member via wheelchair.     Amino acid infusion started 30 minutes prior to Lutathera and continued for three hours after its completion.     Pt educated on process/radiation safety and Lutathera administered by [facility] staff.     Pt encouraged to hydrate and urinate frequently.  Amino acid infusion tolerated w/o adverse reaction.     Sandostatin LAR injection given four hours post Lutathera.      Discharge instructions given by MD.  Discharged home via wheelchair.

## 2024-07-18 ENCOUNTER — OFFICE VISIT (OUTPATIENT)
Dept: HEMATOLOGY/ONCOLOGY | Facility: HOSPITAL | Age: 44
End: 2024-07-18
Attending: INTERNAL MEDICINE
Payer: COMMERCIAL

## 2024-07-18 VITALS
HEIGHT: 60 IN | WEIGHT: 134.81 LBS | BODY MASS INDEX: 26.47 KG/M2 | HEART RATE: 65 BPM | OXYGEN SATURATION: 96 % | TEMPERATURE: 98 F | RESPIRATION RATE: 16 BRPM | SYSTOLIC BLOOD PRESSURE: 101 MMHG | DIASTOLIC BLOOD PRESSURE: 65 MMHG

## 2024-07-18 DIAGNOSIS — K74.3 PRIMARY BILIARY CHOLANGITIS (HCC): ICD-10-CM

## 2024-07-18 DIAGNOSIS — C7A.8 PRIMARY MALIGNANT NEUROENDOCRINE TUMOR OF PANCREAS (HCC): Primary | ICD-10-CM

## 2024-07-18 LAB
ALBUMIN SERPL-MCNC: 4 G/DL (ref 3.2–4.8)
ALBUMIN/GLOB SERPL: 1.1 {RATIO} (ref 1–2)
ALP LIVER SERPL-CCNC: 435 U/L
ALT SERPL-CCNC: 113 U/L
ANION GAP SERPL CALC-SCNC: 6 MMOL/L (ref 0–18)
AST SERPL-CCNC: 76 U/L (ref ?–34)
BASOPHILS # BLD AUTO: 0.03 X10(3) UL (ref 0–0.2)
BASOPHILS NFR BLD AUTO: 0.6 %
BILIRUB DIRECT SERPL-MCNC: 1.1 MG/DL (ref ?–0.3)
BILIRUB SERPL-MCNC: 1.7 MG/DL (ref 0.3–1.2)
BUN BLD-MCNC: 7 MG/DL (ref 9–23)
CALCIUM BLD-MCNC: 8.9 MG/DL (ref 8.7–10.4)
CHLORIDE SERPL-SCNC: 104 MMOL/L (ref 98–112)
CO2 SERPL-SCNC: 26 MMOL/L (ref 21–32)
CREAT BLD-MCNC: 0.56 MG/DL
EGFRCR SERPLBLD CKD-EPI 2021: 114 ML/MIN/1.73M2 (ref 60–?)
EOSINOPHIL # BLD AUTO: 0.19 X10(3) UL (ref 0–0.7)
EOSINOPHIL NFR BLD AUTO: 3.7 %
ERYTHROCYTE [DISTWIDTH] IN BLOOD BY AUTOMATED COUNT: 16.2 %
GLOBULIN PLAS-MCNC: 3.5 G/DL (ref 2.8–4.4)
GLUCOSE BLD-MCNC: 201 MG/DL (ref 70–99)
HCT VFR BLD AUTO: 31.6 %
HGB BLD-MCNC: 10.7 G/DL
IMM GRANULOCYTES # BLD AUTO: 0.04 X10(3) UL (ref 0–1)
IMM GRANULOCYTES NFR BLD: 0.8 %
LYMPHOCYTES # BLD AUTO: 0.26 X10(3) UL (ref 1–4)
LYMPHOCYTES NFR BLD AUTO: 5.1 %
MCH RBC QN AUTO: 29.2 PG (ref 26–34)
MCHC RBC AUTO-ENTMCNC: 33.9 G/DL (ref 31–37)
MCV RBC AUTO: 86.3 FL
MONOCYTES # BLD AUTO: 0.35 X10(3) UL (ref 0.1–1)
MONOCYTES NFR BLD AUTO: 6.8 %
NEUTROPHILS # BLD AUTO: 4.24 X10 (3) UL (ref 1.5–7.7)
NEUTROPHILS # BLD AUTO: 4.24 X10(3) UL (ref 1.5–7.7)
NEUTROPHILS NFR BLD AUTO: 83 %
OSMOLALITY SERPL CALC.SUM OF ELEC: 286 MOSM/KG (ref 275–295)
PLATELET # BLD AUTO: 132 10(3)UL (ref 150–450)
POTASSIUM SERPL-SCNC: 4 MMOL/L (ref 3.5–5.1)
PROT SERPL-MCNC: 7.5 G/DL (ref 5.7–8.2)
RBC # BLD AUTO: 3.66 X10(6)UL
SODIUM SERPL-SCNC: 136 MMOL/L (ref 136–145)
WBC # BLD AUTO: 5.1 X10(3) UL (ref 4–11)

## 2024-07-18 PROCEDURE — G2211 COMPLEX E/M VISIT ADD ON: HCPCS | Performed by: INTERNAL MEDICINE

## 2024-07-18 PROCEDURE — 99215 OFFICE O/P EST HI 40 MIN: CPT | Performed by: INTERNAL MEDICINE

## 2024-07-18 NOTE — PROGRESS NOTES
Education Record    Learner:  Patient and Spouse    Disease / Diagnosis:    Barriers / Limitations:  None   Comments:    Method:  Discussion   Comments:    General Topics:  Medication, Pain, Side effects and symptom management, and Plan of care reviewed   Comments:    Outcome:  Observed demonstration and Shows understanding   Comments:    Pt here for f/u after starting Ludathericil. Reports fatigue/weakness and minimal dizziness after treatment. No complaints of pain, n/v/d. She is having some hair loss and minimal constipation. Appetite is improved.

## 2024-07-18 NOTE — PROGRESS NOTES
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 Path of pancreatic head mass and periportal lymph node both consistent with well differentiated, grade 2 neuroendocrine tumor, Ki-67 3%  1/31/24: Dotatate PET with avid pancreatic head mass, diffuse lymph node metastases of left supraclavicular region, mediastinum, hilum, retroperitoneum and mesentery  2/14/24: C1 lanreotide  3/13/24: C2 lanreotide  4/12/24: C3 lanreotide  4/23/24: s/p liver biopsy with Dr Magdaleno, path positive for primary biliary cholangitis  5/9/24: C4 lanreotide  5/17/24: CT enterography with disease progression in pancreas, abdominal/retroperitoneal adenopathy  6/21/24: C1 Lutathera    History of Present Illness:      43 y/o F PMH 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    - next dose of Lutathera is set up for 8/16/24  - she is having hair thinning  - her abdominal pain is resolved  - had a lot of fatigue after the first dose which took a few weeks to recover  - no diarrhea    Past Medical History:  Past Medical History:    Abdominal pain    Blood disorder    ANEMIA    Blood in the stool    Cancer  (HCC)    pancreas    Constipation    Disorder of liver    elevated enzymes    Easy bruising    Flatulence/gas pain/belching    Headache disorder    Heartburn    Heavy menses    Hemorrhoids    Indigestion    Irregular bowel habits    Itch of skin    Leg swelling    Menses painful    Nausea    Stool incontinence    Stress    Uncomfortable fullness after meals    Visual impairment    reading glasses    Vomiting    Wears glasses    Weight loss     Past Surgical History:   Procedure Laterality Date    Needle biopsy left      Tubal ligation         Home Medications:  No outpatient medications have been marked as taking for the 7/18/24 encounter (Appointment) with Eduar Alfred MD.       Allergies:   No Known Allergies    Psychosocial History:  Social History     Socioeconomic History    Marital status:      Spouse name: Not on file    Number of children: Not on file    Years of education: Not on file    Highest education level: Not on file   Occupational History    Not on file   Tobacco Use    Smoking status: Never    Smokeless tobacco: Never   Vaping Use    Vaping status: Never Used   Substance and Sexual Activity    Alcohol use: Not Currently    Drug use: Never    Sexual activity: Not on file   Other Topics Concern    Not on file   Social History Narrative    Not on file     Social Determinants of Health     Financial Resource Strain: Not on file   Food Insecurity: Food Insecurity Present (2/9/2024)    [facility]    Food Insecurity     Currently or in the past 3 months, have you worried your food would run out before you had money to buy more?: Yes     In the past 12 months, have you run out of food or been unable to get more?: No   Transportation Needs: No Transportation Needs (2/9/2024)    [facility]    Transportation Needs     Currently or in the past 3 months, has lack of  transportation kept you from medical appointments, getting food or medicine, or providing care to a family member?: Not on file     : Not on file     Medical Transportation Needs?: No     Daily Living Transportation Needs? [Peds Only] : Not on file   Physical Activity: Not on file   Stress: Not on file   Social Connections: Unknown (3/13/2021)    [facility]    Social Connections     Conversations with friends/family/neighbors per week: Not on file   Housing Stability: Low Risk  (11/10/2023)    [facility]    Housing Stability     Mortgage Payment Concerns?: Not on file     Number of Places Lived in the Last Year: Not on file     Unstable Housing?: Not on file       Family Medical History:  No family history on file.    Review of Systems:  A 10-point ROS was done with pertinent positives and negative per the HPI    Vital Signs:  Height: --  Weight: --  BSA (Calculated - sq m): --  Pulse: --  BP: --  Temp: --  Do Not Use - Resp Rate: --  SpO2: --    Wt Readings from Last 6 Encounters:   06/21/24 63.1 kg (139 lb 3.2 oz)   06/18/24 64 kg (141 lb)   06/07/24 65.5 kg (144 lb 6.4 oz)   05/10/24 65.3 kg (144 lb)   04/29/24 68.5 kg (151 lb)   04/12/24 68.5 kg (151 lb)     Physical Examination:  General: Patient is alert and oriented, not in acute distress  Psych: Mood and affect are appropriate  Eyes: EOMI, PERRL  ENT: Oropharynx is clear, no adenopathy  CV: no LE edema  Respiratory: Non-labored respirations  GI/Abd: Soft, non-tender  Neurological: Grossly intact   Lymphatics: No palpable inguinal lymphadenopathy  Skin: no rashes or petechiae    Laboratory:  Lab Results   Component Value Date    WBC 5.3 06/18/2024    WBC 5.8 06/07/2024    WBC 9.5 05/10/2024    HGB 11.0 (L) 06/18/2024    HGB 10.6 (L) 06/07/2024    HGB 11.3 (L) 05/10/2024    HCT 32.9 (L) 06/18/2024    MCV 85.2 06/18/2024    MCH  28.5 06/18/2024    MCHC 33.4 06/18/2024    RDW 15.2 06/18/2024    .0 06/18/2024    .0 06/07/2024    .0 05/10/2024     Lab Results   Component Value Date     (H) 06/18/2024    BUN 7 (L) 06/18/2024    CREATSERUM 0.67 06/18/2024    CREATSERUM 0.58 06/07/2024    CREATSERUM 0.62 05/10/2024    ANIONGAP 9 06/18/2024    CA 8.9 06/18/2024    OSMOCALC 284 06/18/2024    ALKPHO 279 (H) 06/18/2024    AST 55 (H) 06/18/2024    ALT 75 (H) 06/18/2024    BILT 0.6 06/18/2024    TP 8.1 06/18/2024    ALB 3.6 06/18/2024    GLOBULIN 4.5 (H) 06/18/2024     06/18/2024    K 4.0 06/18/2024     06/18/2024    CO2 22.0 06/18/2024     Lab Results   Component Value Date    INR 1.05 04/23/2024         Impression & Plan:     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, grade 2  - metastatic to liver and diffuse lymphadenopathy (ki-67 3%)  - s/p metal biliary stent placement 1/10/24  - baseline chromogranin A 229  - previously discussed incurable prognosis and palliative intent of treatment  - progressed on 1L single agent lanreotide  - received C1 Lutathera 6/21/24  - SSA tomorrow at [place] (due to insurance)  - next dose of Lutathera 8/16/24     Elevated LFTs  - asymptomatic, afebrile. Will discuss with GI if related to PBC vs underlying stent if pt needs stent re-evaluation    Iron deficiency anemia  - s/p IV iron dextran 2/14/24, IV infed 6/18/24  - Plan to recheck iron labs in ~9/18/24    Primary biliary cholangitis  - on ursodiol; following with Dr Magdaleno    F/u: 1 month    Eduar Alfred  Hematology/Medical Oncology  [facility]

## 2024-07-19 ENCOUNTER — HOSPITAL ENCOUNTER (OUTPATIENT)
Dept: MRI IMAGING | Age: 44
Discharge: HOME | End: 2024-07-19
Attending: INTERNAL MEDICINE
Payer: COMMERCIAL

## 2024-07-19 ENCOUNTER — NURSE ONLY (OUTPATIENT)
Dept: HEMATOLOGY/ONCOLOGY | Facility: HOSPITAL | Age: 44
End: 2024-07-19
Attending: INTERNAL MEDICINE
Payer: COMMERCIAL

## 2024-07-19 DIAGNOSIS — C7B.8 METASTATIC MALIGNANT NEUROENDOCRINE TUMOR TO LIVER (HCC): Primary | ICD-10-CM

## 2024-07-19 DIAGNOSIS — C7A.8 NEUROENDOCRINE CARCINOMA METASTATIC TO INTRA-ABDOMINAL LYMPH NODE (HCC): ICD-10-CM

## 2024-07-19 DIAGNOSIS — C7B.8 NEUROENDOCRINE CARCINOMA METASTATIC TO INTRA-ABDOMINAL LYMPH NODE (HCC): ICD-10-CM

## 2024-07-19 DIAGNOSIS — C7A.8 PRIMARY MALIGNANT NEUROENDOCRINE TUMOR OF PANCREAS (HCC): ICD-10-CM

## 2024-07-19 DIAGNOSIS — R79.89 ELEVATED LFTS: ICD-10-CM

## 2024-07-19 PROCEDURE — 74183 MRI ABD W/O CNTR FLWD CNTR: CPT | Performed by: INTERNAL MEDICINE

## 2024-07-19 PROCEDURE — 76376 3D RENDER W/INTRP POSTPROCES: CPT | Performed by: INTERNAL MEDICINE

## 2024-07-19 PROCEDURE — 96372 THER/PROPH/DIAG INJ SC/IM: CPT

## 2024-07-19 PROCEDURE — A9575 INJ GADOTERATE MEGLUMI 0.1ML: HCPCS | Performed by: INTERNAL MEDICINE

## 2024-07-19 RX ORDER — PROCHLORPERAZINE MALEATE 10 MG
10 TABLET ORAL ONCE
OUTPATIENT
Start: 2024-07-20

## 2024-07-19 RX ORDER — ONDANSETRON 2 MG/ML
8 INJECTION INTRAMUSCULAR; INTRAVENOUS ONCE
OUTPATIENT
Start: 2024-07-20

## 2024-07-19 RX ORDER — GADOTERATE MEGLUMINE 376.9 MG/ML
15 INJECTION INTRAVENOUS
Status: COMPLETED | OUTPATIENT
Start: 2024-07-19 | End: 2024-07-19

## 2024-07-19 RX ORDER — ARGININE/LYSINE/STERILE WATER 25-25MG/ML
1000 PLASTIC BAG, INJECTION (ML) INTRAVENOUS ONCE
OUTPATIENT
Start: 2024-07-20

## 2024-07-19 RX ORDER — DEXAMETHASONE SODIUM PHOSPHATE 10 MG/ML
10 INJECTION, SOLUTION INTRAMUSCULAR; INTRAVENOUS ONCE
OUTPATIENT
Start: 2024-07-20

## 2024-07-19 RX ORDER — OCTREOTIDE ACETATE 500 UG/ML
300 INJECTION, SOLUTION INTRAVENOUS; SUBCUTANEOUS ONCE
OUTPATIENT
Start: 2024-07-20

## 2024-07-19 RX ADMIN — GADOTERATE MEGLUMINE 13 ML: 376.9 INJECTION INTRAVENOUS at 15:16:00

## 2024-07-22 LAB — CHROMOGRANIN A: 161.1 NG/ML

## 2024-07-29 ENCOUNTER — DOCUMENTATION ONLY (OUTPATIENT)
Dept: HEMATOLOGY/ONCOLOGY | Facility: HOSPITAL | Age: 44
End: 2024-07-29

## 2024-07-29 ENCOUNTER — LAB ENCOUNTER (OUTPATIENT)
Dept: LAB | Age: 44
End: 2024-07-29
Attending: PHYSICIAN ASSISTANT
Payer: COMMERCIAL

## 2024-07-29 DIAGNOSIS — D3A.8 PRIMARY PANCREATIC NEUROENDOCRINE TUMOR (HCC): ICD-10-CM

## 2024-07-29 LAB
ALBUMIN SERPL-MCNC: 4.3 G/DL (ref 3.2–4.8)
ALBUMIN/GLOB SERPL: 1.2 {RATIO} (ref 1–2)
ALP LIVER SERPL-CCNC: 298 U/L
ALT SERPL-CCNC: 53 U/L
ANION GAP SERPL CALC-SCNC: 7 MMOL/L (ref 0–18)
AST SERPL-CCNC: 35 U/L (ref ?–34)
BASOPHILS # BLD AUTO: 0.02 X10(3) UL (ref 0–0.2)
BASOPHILS NFR BLD AUTO: 0.3 %
BILIRUB SERPL-MCNC: 1.4 MG/DL (ref 0.3–1.2)
BUN BLD-MCNC: 12 MG/DL (ref 9–23)
CALCIUM BLD-MCNC: 8.5 MG/DL (ref 8.7–10.4)
CHLORIDE SERPL-SCNC: 106 MMOL/L (ref 98–112)
CO2 SERPL-SCNC: 26 MMOL/L (ref 21–32)
CREAT BLD-MCNC: 0.54 MG/DL
EGFRCR SERPLBLD CKD-EPI 2021: 115 ML/MIN/1.73M2 (ref 60–?)
EOSINOPHIL # BLD AUTO: 0.11 X10(3) UL (ref 0–0.7)
EOSINOPHIL NFR BLD AUTO: 1.4 %
ERYTHROCYTE [DISTWIDTH] IN BLOOD BY AUTOMATED COUNT: 15.8 %
FASTING STATUS PATIENT QL REPORTED: NO
GLOBULIN PLAS-MCNC: 3.7 G/DL (ref 2–3.5)
GLUCOSE BLD-MCNC: 89 MG/DL (ref 70–99)
HCT VFR BLD AUTO: 30.4 %
HGB BLD-MCNC: 10 G/DL
IMM GRANULOCYTES # BLD AUTO: 0.03 X10(3) UL (ref 0–1)
IMM GRANULOCYTES NFR BLD: 0.4 %
LYMPHOCYTES # BLD AUTO: 0.42 X10(3) UL (ref 1–4)
LYMPHOCYTES NFR BLD AUTO: 5.4 %
MCH RBC QN AUTO: 29.2 PG (ref 26–34)
MCHC RBC AUTO-ENTMCNC: 32.9 G/DL (ref 31–37)
MCV RBC AUTO: 88.9 FL
MONOCYTES # BLD AUTO: 0.69 X10(3) UL (ref 0.1–1)
MONOCYTES NFR BLD AUTO: 8.9 %
NEUTROPHILS # BLD AUTO: 6.49 X10 (3) UL (ref 1.5–7.7)
NEUTROPHILS # BLD AUTO: 6.49 X10(3) UL (ref 1.5–7.7)
NEUTROPHILS NFR BLD AUTO: 83.6 %
OSMOLALITY SERPL CALC.SUM OF ELEC: 287 MOSM/KG (ref 275–295)
PLATELET # BLD AUTO: 171 10(3)UL (ref 150–450)
POTASSIUM SERPL-SCNC: 3.6 MMOL/L (ref 3.5–5.1)
PROT SERPL-MCNC: 8 G/DL (ref 5.7–8.2)
RBC # BLD AUTO: 3.42 X10(6)UL
SODIUM SERPL-SCNC: 139 MMOL/L (ref 136–145)
WBC # BLD AUTO: 7.8 X10(3) UL (ref 4–11)

## 2024-07-29 PROCEDURE — 80053 COMPREHEN METABOLIC PANEL: CPT

## 2024-07-29 PROCEDURE — 36415 COLL VENOUS BLD VENIPUNCTURE: CPT

## 2024-07-29 PROCEDURE — 85025 COMPLETE CBC W/AUTO DIFF WBC: CPT

## 2024-07-29 NOTE — PROGRESS NOTES
ONCOLOGY NUTRITION F/U SCREEN complete as triggered by [facility] dx of 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, grade 2 - metastatic to liver Chart reviewed. Pt appears nutritionally stable at present. RD available on consult.

## 2024-08-14 NOTE — PROGRESS NOTES
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 Path of pancreatic head mass and periportal lymph node both consistent with well differentiated, grade 2 neuroendocrine tumor, Ki-67 3%  1/31/24: Dotatate PET with avid pancreatic head mass, diffuse lymph node metastases of left supraclavicular region, mediastinum, hilum, retroperitoneum and mesentery  2/14/24: C1 lanreotide  3/13/24: C2 lanreotide  4/12/24: C3 lanreotide  4/23/24: s/p liver biopsy with Dr Magdaleno, path positive for primary biliary cholangitis  5/9/24: C4 lanreotide  5/17/24: CT enterography with disease progression in pancreas, abdominal/retroperitoneal adenopathy  6/21/24: C1 Lutathera    History of Present Illness:      45 y/o F PMH 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    - next dose of Lutathera is set up for 8/16/24  - she reported that last week she had chills for 1 day which resolved afterwards  - continues to have intermittent mild back discomfort that she does not take any pain medications for  - denies any abdominal pain or tenderness. Appetite/weight stable    Past Medical History:  Past  Medical History:    Abdominal pain    Blood disorder    ANEMIA    Blood in the stool    Cancer (HCC)    pancreas    Constipation    Disorder of liver    elevated enzymes    Easy bruising    Flatulence/gas pain/belching    Headache disorder    Heartburn    Heavy menses    Hemorrhoids    Indigestion    Irregular bowel habits    Itch of skin    Leg swelling    Menses painful    Nausea    Stool incontinence    Stress    Uncomfortable fullness after meals    Visual impairment    reading glasses    Vomiting    Wears glasses    Weight loss     Past Surgical History:   Procedure Laterality Date    Needle biopsy left      Tubal ligation         Home Medications:  No outpatient medications have been marked as taking for the 8/15/24 encounter (Appointment) with Eduar Alfred MD.       Allergies:   No Known Allergies    Psychosocial History:  Social History     Socioeconomic History    Marital status:      Spouse name: Not on file    Number of children: Not on file    Years of education: Not on file    Highest education level: Not on file   Occupational History    Not on file   Tobacco Use    Smoking status: Never    Smokeless tobacco: Never   Vaping Use    Vaping status: Never Used   Substance and Sexual Activity    Alcohol use: Not Currently    Drug use: Never    Sexual activity: Not on file   Other Topics Concern    Not on file   Social History Narrative    Not on file     Social Determinants of Health     Financial Resource Strain: Not on file   Food Insecurity: Food Insecurity Present (2/9/2024)    [facility]    Food Insecurity     Currently or in the past 3 months, have you worried your food would run out before you had money to buy more?: Yes     In the past 12 months, have you run out of food or been unable to get more?: No   Transportation Needs: No Transportation Needs (2/9/2024)    [facility]    Transportation Needs     Currently or in the past 3 months, has lack of transportation kept you from medical appointments, getting food or medicine, or providing care to a family member?: Not on file     : Not on file     Medical Transportation Needs?: No     Daily Living Transportation Needs? [Peds Only] : Not on file   Physical Activity: Not on file   Stress: Not on file   Social Connections: Unknown (3/13/2021)    [facility]    Social Connections     Conversations with friends/family/neighbors per week: Not on file   Housing Stability: Low Risk  (11/10/2023)    [facility]    Housing Stability     Mortgage Payment Concerns?: Not on file     Number of Places Lived in the Last Year: Not on file     Unstable Housing?: Not on file       Family Medical History:  No family history on file.    Review of Systems:  A 10-point ROS was done with pertinent positives and negative per the HPI    Vital Signs:  Height: --  Weight: --  BSA (Calculated - sq m): --  Pulse: --  BP: --  Temp: --  Do Not Use - Resp Rate: --  SpO2: --    Wt Readings from Last 6 Encounters:   07/18/24 61.1 kg (134 lb 12.8 oz)   06/21/24 63.1 kg (139 lb 3.2 oz)   06/18/24 64 kg (141 lb)   06/07/24 65.5 kg (144 lb 6.4 oz)   05/10/24 65.3 kg (144 lb)   04/29/24 68.5 kg (151 lb)     Physical Examination:  General: Patient is alert and oriented, not in acute distress  Psych: Mood and affect are appropriate  Eyes: EOMI, PERRL  ENT: Oropharynx is clear, no adenopathy  CV: no LE edema  Respiratory: Non-labored respirations  GI/Abd: Soft, non-tender  Neurological: Grossly intact   Lymphatics: No palpable inguinal lymphadenopathy  Skin: no rashes or petechiae    Laboratory:  Lab Results   Component Value Date    WBC 7.8 07/29/2024    WBC 5.1 07/18/2024    WBC 5.3 06/18/2024    HGB 10.0 (L) 07/29/2024    HGB 10.7 (L)  07/18/2024    HGB 11.0 (L) 06/18/2024    HCT 30.4 (L) 07/29/2024    MCV 88.9 07/29/2024    MCH 29.2 07/29/2024    MCHC 32.9 07/29/2024    RDW 15.8 07/29/2024    .0 07/29/2024    .0 (L) 07/18/2024    .0 06/18/2024     Lab Results   Component Value Date    GLU 89 07/29/2024    BUN 12 07/29/2024    CREATSERUM 0.54 (L) 07/29/2024    CREATSERUM 0.56 07/18/2024    CREATSERUM 0.67 06/18/2024    ANIONGAP 7 07/29/2024    CA 8.5 (L) 07/29/2024    OSMOCALC 287 07/29/2024    ALKPHO 298 (H) 07/29/2024    AST 35 (H) 07/29/2024    ALT 53 (H) 07/29/2024    BILT 1.4 (H) 07/29/2024    TP 8.0 07/29/2024    ALB 4.3 07/29/2024    GLOBULIN 3.7 (H) 07/29/2024     07/29/2024    K 3.6 07/29/2024     07/29/2024    CO2 26.0 07/29/2024     Lab Results   Component Value Date    INR 1.05 04/23/2024         Impression & Plan:     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, grade 2  - metastatic to liver and diffuse lymphadenopathy (ki-67 3%)  - s/p metal biliary stent placement 1/10/24  - baseline chromogranin A 229  - previously discussed incurable prognosis and palliative intent of treatment  - progressed on 1L single agent lanreotide  - received C1 Lutathera 6/21/24  - receiving SSA at [place] due to insurance  - next dose of Lutathera 8/16/24     Elevated LFTs  - MRI 7/19/2024 showed thickening/hyperemia of gallbladder fundus, no biliary obstruction.   - her LFTs remain elevated. She is not reporting any abdominal pain and has no RUQ/abdominal tenderness on palpation. Given asymptomatic, discussed with Dr Magdaleno, monitor for now. We discussed that if she develops RUQ pain or obstruction we would be concerned about cholecystitis vs cholangitis and she would need to go to ED    Iron deficiency anemia  - s/p IV iron dextran 2/14/24, IV infed 6/18/24  - Plan to recheck iron labs in ~9/18/24    Primary biliary cholangitis  - on ursodiol; following with Dr Magdaleno    F/u: 1 month    Eduar  Tima  Hematology/Medical Oncology  [facility]

## 2024-08-15 ENCOUNTER — OFFICE VISIT (OUTPATIENT)
Dept: HEMATOLOGY/ONCOLOGY | Facility: HOSPITAL | Age: 44
End: 2024-08-15
Attending: INTERNAL MEDICINE
Payer: COMMERCIAL

## 2024-08-15 VITALS
HEART RATE: 72 BPM | TEMPERATURE: 98 F | SYSTOLIC BLOOD PRESSURE: 103 MMHG | RESPIRATION RATE: 16 BRPM | OXYGEN SATURATION: 100 % | HEIGHT: 60 IN | DIASTOLIC BLOOD PRESSURE: 68 MMHG | BODY MASS INDEX: 24.97 KG/M2 | WEIGHT: 127.19 LBS

## 2024-08-15 DIAGNOSIS — C7A.8 PRIMARY MALIGNANT NEUROENDOCRINE TUMOR OF PANCREAS (HCC): ICD-10-CM

## 2024-08-15 DIAGNOSIS — C7A.8 NEUROENDOCRINE CARCINOMA METASTATIC TO INTRA-ABDOMINAL LYMPH NODE (HCC): ICD-10-CM

## 2024-08-15 DIAGNOSIS — C7B.8 METASTATIC MALIGNANT NEUROENDOCRINE TUMOR TO LIVER (HCC): Primary | ICD-10-CM

## 2024-08-15 DIAGNOSIS — C7B.8 NEUROENDOCRINE CARCINOMA METASTATIC TO INTRA-ABDOMINAL LYMPH NODE (HCC): ICD-10-CM

## 2024-08-15 LAB
ALBUMIN SERPL-MCNC: 3.8 G/DL (ref 3.2–4.8)
ALBUMIN/GLOB SERPL: 1 {RATIO} (ref 1–2)
ALP LIVER SERPL-CCNC: 476 U/L
ALT SERPL-CCNC: 79 U/L
ANION GAP SERPL CALC-SCNC: 7 MMOL/L (ref 0–18)
AST SERPL-CCNC: 70 U/L (ref ?–34)
BASOPHILS # BLD AUTO: 0.03 X10(3) UL (ref 0–0.2)
BASOPHILS NFR BLD AUTO: 0.6 %
BILIRUB SERPL-MCNC: 2.5 MG/DL (ref 0.3–1.2)
BUN BLD-MCNC: 10 MG/DL (ref 9–23)
CALCIUM BLD-MCNC: 9.2 MG/DL (ref 8.7–10.4)
CHLORIDE SERPL-SCNC: 104 MMOL/L (ref 98–112)
CO2 SERPL-SCNC: 24 MMOL/L (ref 21–32)
CREAT BLD-MCNC: 0.57 MG/DL
EGFRCR SERPLBLD CKD-EPI 2021: 113 ML/MIN/1.73M2 (ref 60–?)
EOSINOPHIL # BLD AUTO: 0.11 X10(3) UL (ref 0–0.7)
EOSINOPHIL NFR BLD AUTO: 2.2 %
ERYTHROCYTE [DISTWIDTH] IN BLOOD BY AUTOMATED COUNT: 14.7 %
GLOBULIN PLAS-MCNC: 4 G/DL (ref 2–3.5)
GLUCOSE BLD-MCNC: 203 MG/DL (ref 70–99)
HCG UR QL: NEGATIVE
HCT VFR BLD AUTO: 29.2 %
HGB BLD-MCNC: 9.6 G/DL
IMM GRANULOCYTES # BLD AUTO: 0.03 X10(3) UL (ref 0–1)
IMM GRANULOCYTES NFR BLD: 0.6 %
LYMPHOCYTES # BLD AUTO: 0.27 X10(3) UL (ref 1–4)
LYMPHOCYTES NFR BLD AUTO: 5.4 %
MCH RBC QN AUTO: 28.9 PG (ref 26–34)
MCHC RBC AUTO-ENTMCNC: 32.9 G/DL (ref 31–37)
MCV RBC AUTO: 88 FL
MONOCYTES # BLD AUTO: 0.41 X10(3) UL (ref 0.1–1)
MONOCYTES NFR BLD AUTO: 8.2 %
NEUTROPHILS # BLD AUTO: 4.16 X10 (3) UL (ref 1.5–7.7)
NEUTROPHILS # BLD AUTO: 4.16 X10(3) UL (ref 1.5–7.7)
NEUTROPHILS NFR BLD AUTO: 83 %
OSMOLALITY SERPL CALC.SUM OF ELEC: 285 MOSM/KG (ref 275–295)
PLATELET # BLD AUTO: 207 10(3)UL (ref 150–450)
POTASSIUM SERPL-SCNC: 3.4 MMOL/L (ref 3.5–5.1)
PROT SERPL-MCNC: 7.8 G/DL (ref 5.7–8.2)
RBC # BLD AUTO: 3.32 X10(6)UL
SODIUM SERPL-SCNC: 135 MMOL/L (ref 136–145)
WBC # BLD AUTO: 5 X10(3) UL (ref 4–11)

## 2024-08-15 PROCEDURE — 99215 OFFICE O/P EST HI 40 MIN: CPT | Performed by: INTERNAL MEDICINE

## 2024-08-15 PROCEDURE — G2211 COMPLEX E/M VISIT ADD ON: HCPCS | Performed by: INTERNAL MEDICINE

## 2024-08-15 RX ORDER — ONDANSETRON 2 MG/ML
8 INJECTION INTRAMUSCULAR; INTRAVENOUS ONCE
Status: CANCELLED | OUTPATIENT
Start: 2024-09-12

## 2024-08-15 RX ORDER — OCTREOTIDE ACETATE 500 UG/ML
300 INJECTION, SOLUTION INTRAVENOUS; SUBCUTANEOUS ONCE
OUTPATIENT
Start: 2024-09-12

## 2024-08-15 RX ORDER — ARGININE/LYSINE/STERILE WATER 25-25MG/ML
1000 PLASTIC BAG, INJECTION (ML) INTRAVENOUS ONCE
Status: CANCELLED | OUTPATIENT
Start: 2024-09-12

## 2024-08-15 RX ORDER — PROCHLORPERAZINE MALEATE 10 MG
10 TABLET ORAL ONCE
Status: CANCELLED | OUTPATIENT
Start: 2024-09-12

## 2024-08-15 RX ORDER — DEXAMETHASONE SODIUM PHOSPHATE 10 MG/ML
10 INJECTION, SOLUTION INTRAMUSCULAR; INTRAVENOUS ONCE
OUTPATIENT
Start: 2024-09-12

## 2024-08-16 ENCOUNTER — HOSPITAL ENCOUNTER (OUTPATIENT)
Dept: NUCLEAR MEDICINE | Facility: HOSPITAL | Age: 44
Discharge: HOME | End: 2024-08-16
Attending: PHYSICIAN ASSISTANT
Payer: COMMERCIAL

## 2024-08-16 ENCOUNTER — OFFICE VISIT (OUTPATIENT)
Dept: HEMATOLOGY/ONCOLOGY | Facility: HOSPITAL | Age: 44
End: 2024-08-16
Attending: INTERNAL MEDICINE
Payer: COMMERCIAL

## 2024-08-16 VITALS
OXYGEN SATURATION: 98 % | SYSTOLIC BLOOD PRESSURE: 99 MMHG | HEART RATE: 73 BPM | RESPIRATION RATE: 16 BRPM | DIASTOLIC BLOOD PRESSURE: 61 MMHG | TEMPERATURE: 98 F

## 2024-08-16 DIAGNOSIS — C7B.8 NEUROENDOCRINE CARCINOMA METASTATIC TO INTRA-ABDOMINAL LYMPH NODE (HCC): ICD-10-CM

## 2024-08-16 DIAGNOSIS — C7A.8 NEUROENDOCRINE CANCER (HCC): ICD-10-CM

## 2024-08-16 DIAGNOSIS — C7A.8 NEUROENDOCRINE CARCINOMA METASTATIC TO INTRA-ABDOMINAL LYMPH NODE (HCC): ICD-10-CM

## 2024-08-16 DIAGNOSIS — C7A.8 PRIMARY MALIGNANT NEUROENDOCRINE TUMOR OF PANCREAS (HCC): ICD-10-CM

## 2024-08-16 DIAGNOSIS — C7B.8 METASTATIC MALIGNANT NEUROENDOCRINE TUMOR TO LIVER (HCC): Primary | ICD-10-CM

## 2024-08-16 PROCEDURE — 96366 THER/PROPH/DIAG IV INF ADDON: CPT

## 2024-08-16 PROCEDURE — 79101 NUCLEAR RX IV ADMIN: CPT | Performed by: PHYSICIAN ASSISTANT

## 2024-08-16 PROCEDURE — 96375 TX/PRO/DX INJ NEW DRUG ADDON: CPT

## 2024-08-16 PROCEDURE — 96372 THER/PROPH/DIAG INJ SC/IM: CPT

## 2024-08-16 PROCEDURE — 96365 THER/PROPH/DIAG IV INF INIT: CPT

## 2024-08-16 RX ORDER — ARGININE/LYSINE/STERILE WATER 25-25MG/ML
1000 PLASTIC BAG, INJECTION (ML) INTRAVENOUS ONCE
Status: COMPLETED | OUTPATIENT
Start: 2024-08-16 | End: 2024-08-16

## 2024-08-16 RX ORDER — ONDANSETRON 2 MG/ML
8 INJECTION INTRAMUSCULAR; INTRAVENOUS ONCE
Status: COMPLETED | OUTPATIENT
Start: 2024-08-16 | End: 2024-08-16

## 2024-08-16 RX ORDER — PROCHLORPERAZINE MALEATE 5 MG/1
10 TABLET ORAL ONCE
Status: COMPLETED | OUTPATIENT
Start: 2024-08-16 | End: 2024-08-16

## 2024-08-16 RX ORDER — ARGININE/LYSINE/STERILE WATER 25-25MG/ML
1000 PLASTIC BAG, INJECTION (ML) INTRAVENOUS ONCE
OUTPATIENT
Start: 2024-09-13

## 2024-08-16 RX ORDER — DEXAMETHASONE SODIUM PHOSPHATE 10 MG/ML
10 INJECTION, SOLUTION INTRAMUSCULAR; INTRAVENOUS ONCE
OUTPATIENT
Start: 2024-09-13

## 2024-08-16 RX ORDER — OCTREOTIDE ACETATE 500 UG/ML
300 INJECTION, SOLUTION INTRAVENOUS; SUBCUTANEOUS ONCE
OUTPATIENT
Start: 2024-09-13

## 2024-08-16 RX ORDER — ONDANSETRON 2 MG/ML
8 INJECTION INTRAMUSCULAR; INTRAVENOUS ONCE
OUTPATIENT
Start: 2024-09-13

## 2024-08-16 RX ORDER — ONDANSETRON 2 MG/ML
INJECTION INTRAMUSCULAR; INTRAVENOUS
Status: COMPLETED
Start: 2024-08-16 | End: 2024-08-16

## 2024-08-16 RX ORDER — PROCHLORPERAZINE MALEATE 10 MG
10 TABLET ORAL ONCE
OUTPATIENT
Start: 2024-09-13

## 2024-08-16 RX ADMIN — PROCHLORPERAZINE MALEATE 10 MG: 5 TABLET ORAL at 13:52:00

## 2024-08-16 RX ADMIN — ARGININE/LYSINE/STERILE WATER 1000 ML: 25-25MG/ML PLASTIC BAG, INJECTION (ML) INTRAVENOUS at 08:43:00

## 2024-08-16 RX ADMIN — ARGININE/LYSINE/STERILE WATER: 25-25MG/ML PLASTIC BAG, INJECTION (ML) INTRAVENOUS at 09:58:00

## 2024-08-16 RX ADMIN — ONDANSETRON 8 MG: 2 INJECTION INTRAMUSCULAR; INTRAVENOUS at 08:15:00

## 2024-08-16 NOTE — PROGRESS NOTES
Pt to infusion center for Lutathera treatment. Pt mentioned she experienced some \"car sickness\" on the way home after last treatment, denied vomiting. Antiemetic administered and pt transported to [facility] by staff member via wheelchair.     Amino acid infusion started 30 minutes prior to Lutathera and continued for three hours after its completion.      Pt encouraged to hydrate and urinate frequently.  Amino acid infusion tolerated w/o adverse reaction.  After completion, pt reported mild nausea \"if feels strange when I try to burb\".  Compazine administered.    Sandostatin LAR injection given four hours post Lutathera.  Discharged home via wheelchair.    Education Record    Learner:  Patient  Barriers / Limitations:  Language   Comments: Pt confirms understanding of instructions in English with teachback, denies need for   Method:  Discussion and Reinforcement  General Topics:  Procedure, radiation safety, discharge instructions   Comments: Education provided by [facility] physician  Outcome:  Shows understanding

## 2024-08-21 ENCOUNTER — TELEPHONE (OUTPATIENT)
Dept: HEMATOLOGY/ONCOLOGY | Facility: HOSPITAL | Age: 44
End: 2024-08-21

## 2024-08-21 NOTE — TELEPHONE ENCOUNTER
Spoke with patient regarding Dr. Alfred recommendations: have patient \"call Diego's office to return their call. They are trying to get her in for ERCP to evaluate her stent and LFTs.\" Office phone number provided to patient, patient stated understanding.

## 2024-08-26 ENCOUNTER — HOSPITAL ENCOUNTER (OUTPATIENT)
Dept: CT IMAGING | Facility: HOSPITAL | Age: 44
Discharge: HOME | End: 2024-08-26
Attending: INTERNAL MEDICINE
Payer: COMMERCIAL

## 2024-08-26 DIAGNOSIS — R79.89 ABNORMAL LFTS: ICD-10-CM

## 2024-08-26 DIAGNOSIS — R93.2 ABNORMAL FINDINGS ON DIAGNOSTIC IMAGING OF GALLBLADDER: ICD-10-CM

## 2024-08-26 PROCEDURE — 74178 CT ABD&PLV WO CNTR FLWD CNTR: CPT | Performed by: INTERNAL MEDICINE

## 2024-09-03 NOTE — H&P
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 LFTs elevated: Total Bilirubin 2.6, AST//294,      12/29/2023: US liver performed showing 4.6 hypoechoic stricture in the region of the pancreatic head, concerning for malignancy or malignant lymphadenopathy. Noted intrahepatic and extrahepatic biliary ductal dilatation.     1/6/2024: CT A/P showing 3.3 x 2.8 x 3.7 cm hypoenhancing mass in the region of the pancreatic head suspicious for primary pancreatic cancer. Mass involves the portal vein and superior mesenteric vein with > 180 degree involvement, and abuts the common hepatic artery. Enlarged peripancreatic lymph nodes. 1.8 x 1.8 cm hypoenhancing mass in right hepatic lobe suspicious for metastatic disease.      1/10/2024: ERCP with EUS/FNA performed by Dr Grady showing 3 cm tight malignant stricture of the CBD with upstream dilatation of the CHD. EUS with 3.3 cm hypoechoic mass at head of pancreas causing  obstruction. Biopsies of pancreatic mass and periportal lymph nodes --> well differentiated NET of pancreatic head, metastatic NET in periportal lymph nodes    1/31/24: Dotatate PET with avid pancreatic head mass, diffuse lymph node metastases of left supraclavicular region, mediastinum, hilum, retroperitoneum and mesentery  2/14/24: started lanreotide  4/23/24: s/p liver biopsy with Dr Magdaleno, path positive for primary biliary cholangitis  5/17/24: CT enterography with disease progression in pancreas, abdominal/retroperitoneal adenopathy  6/21/24: started Lutathera          Vital Signs:  BP 95/59 (BP Location: Left arm, Patient Position: Sitting, Cuff Size: adult)   Pulse 66   Temp 98.1 °F (36.7 °C) (Temporal)   Resp 20   Wt 57.6 kg (127 lb)   LMP 08/05/2024 (Approximate)   SpO2 99%   BMI 24.80 kg/m²      Medications Reviewed:    Current Outpatient Medications:     Multiple Vitamins-Minerals (MULTI-VITAMIN/MINERALS) Oral Tab, Take 1 tablet by mouth daily., Disp: , Rfl:     prochlorperazine (COMPAZINE) 10 mg tablet, Take 1 tablet (10 mg total) by mouth every 6 (six) hours as needed for Nausea., Disp: 60 tablet, Rfl: 1    Ursodiol 500 MG Oral Tab, Take 1 tablet (500 mg total) by mouth in the morning and 1 tablet (500 mg total) before bedtime., Disp: 180 tablet, Rfl: 3    NON FORMULARY, Unsure of name of medication every 4 weeks injection for cancer of pancreas., Disp: , Rfl:      Allergies Reviewed:  No Known Allergies     History:  Reviewed:  Past Medical History:    Abdominal pain    Blood disorder    anemia    Blood in the stool    Cancer (HCC)    pancreas    Constipation    Disorder of liver    elevated enzymes    Easy bruising    Exposure to medical diagnostic radiation    last tx 8/16/2024    Flatulence/gas pain/belching    Headache disorder    Heartburn    Heavy menses    Hemorrhoids    Indigestion    Irregular bowel habits    Itch of skin    Leg swelling    Menses painful    Nausea    Stool incontinence     Stress    Uncomfortable fullness after meals    Visual impairment    reading glasses    Vomiting    Wears glasses    Weight loss      Reviewed:  Past Surgical History:   Procedure Laterality Date    Needle biopsy left      Tubal ligation        Reviewed Social History:  Social History     Socioeconomic History    Marital status:    Tobacco Use    Smoking status: Never    Smokeless tobacco: Never   Vaping Use    Vaping status: Never Used   Substance and Sexual Activity    Alcohol use: Not Currently    Drug use: Never     Social Determinants of Health     Food Insecurity: Food Insecurity Present (2/9/2024)    [facility], [facility]    Food Insecurity     Currently or in the past 3 months, have you worried your food would run out before you had money to buy more?: Yes     In the past 12 months, have you run out of food or been unable to get more?: No   Transportation Needs: No Transportation Needs (2/9/2024)    Received from [facility], [facility]    Transportation Needs     Medical Transportation Needs?: No    Received from [facility], [facility]    Social Connections    Received from [facility], [facility]    Housing Stability      Reviewed:  History reviewed. No pertinent family history.   Review of Systems:  GENERAL HEALTH: feels well, no fatigue.   RESPIRATORY: denies shortness of breath, wheezing or cough   CARDIOVASCULAR: denies chest pain, SOB, edema,orthopnea, no palpitations   GI: denies nausea, vomiting, constipation, diarrhea; no rectal bleeding  GENITAL/: no blood in urine  MUSCULOSKELETAL: no joint complaints, no back pain  NEURO: no tingling, numbness, weakness  ENDOCRINE: denies weight loss/gain  PSYCH: no mood changes       Physical Examination:  Constitutional: NAD.   Eyes: Sclera: non-icteric.   Neck: Neck: supple.    Lymph Nodes: Lymph Nodes no cervical LAD, supraclavicular LAD, axillary LAD, or inguinal LAD.   Abdomen: Soft, nontender, right upper quadrant fullness/mass right subcostal.  Musculoskeletal: Extremities: no edema.   Skin: Inspection and palpation: no jaundice.      Document Review:  08/26/2024: CT abdomen and pelvis  LIVER:  There is heterogeneous infiltrative soft tissue along the gallbladder fossa which is new/progressive in comparison to 5/17/2024.  No steatosis.   BILIARY:  Severe irregular gallbladder wall thickening.  Mild intrahepatic biliary ductal dilation.  The common bile duct is within normal limits.   PANCREAS:  Redemonstrated is a soft tissue pancreatic mass measuring approximately 4.6 x 2.8 centimeters, previously 4.2 x 5.4 centimeters when measured similarly.  No significant ductal dilation.   RETROPERITONEUM:  Retroperitoneal lymphadenopathy is overall decreased in size when compared to 5/17/2024, for example there is a 1.8 x 1.0 cm left para-aortic node (series 3, image 50), this previously measured 3.1 x 2.2 centimeters.  There is an   approximately 1.8 x 1.5 cm aortocaval node (series 3, image 41), which previously measured 2.8 x 2.7 centimeters.        Procedure(s):  None     Assessment / Plan:  1. 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  Her son was present.  She is here to consider cholecystectomy.  However, with review of most recent CT scan, gallbladder mass with extension into the liver and surrounding significant infiltration with varicosities renders surgery not feasible.  Images were reviewed with patient.  She verbalized understanding.  In the meantime, she is scheduled for MRI and ERCP later this month.  I will further discuss with Dr. Alfred from medical oncology and I did discuss with her at least a diagnostic laparoscopy to include risks.  Patient agreed and  understood.  She had ample time to ask questions.              Electronically Signed by: Jarrod Napier MD

## 2024-09-04 ENCOUNTER — OFFICE VISIT (OUTPATIENT)
Dept: SURGERY | Facility: CLINIC | Age: 44
End: 2024-09-04
Payer: COMMERCIAL

## 2024-09-04 VITALS
OXYGEN SATURATION: 99 % | BODY MASS INDEX: 25 KG/M2 | HEART RATE: 66 BPM | TEMPERATURE: 98 F | DIASTOLIC BLOOD PRESSURE: 59 MMHG | SYSTOLIC BLOOD PRESSURE: 95 MMHG | RESPIRATION RATE: 20 BRPM | WEIGHT: 127 LBS

## 2024-09-04 DIAGNOSIS — C7B.8 METASTATIC MALIGNANT NEUROENDOCRINE TUMOR TO LIVER (HCC): Primary | ICD-10-CM

## 2024-09-04 PROCEDURE — 99213 OFFICE O/P EST LOW 20 MIN: CPT | Performed by: SURGERY

## 2024-09-04 RX ORDER — MULTIVIT-MIN/IRON FUM/FOLIC AC 7.5 MG-4
1 TABLET ORAL DAILY
COMMUNITY

## 2024-09-04 NOTE — PATIENT INSTRUCTIONS
Surgery:  Diagnostic laparoscopy, possible cholecystectomy    Date of Surgery:    Hospital:    [facility]- [address] [place] [id number]  Phone: [phone]    This is an Outpatient procedure.  Use the provided Chlorhexadine surgical soap(instructions attached) to shower the night before and morning of your procedure.  Do not apply powders, creams, lotions or deodorant after showering.  Do not apply any kind of makeup and make sure to remove nail polish prior to your surgery.  For faster recovery from anesthesia and surgery please follow the instructions below regarding your pre-op diet:  12 hours prior to your surgery time you are to drink one 10oz bottle of Ensure Pre-Surgery Drink. You are to have NO solid food or water after 11pm the night before your surgery EXCEPT one additional 10oz bottle of Ensure Pre-Surgery Drink. You need to finish drinking this 4 hours prior to surgery time.  Take Tylenol 1000mg by mouth at the time of your second Ensure Pre-Surgery drink(4hrs prior to surgery time), unless instructed otherwise by your surgeon.   Bowel Prep: No   If you take Insulin contact your primary care physician for specific instructions regarding dosing around your surgery.  Do not drink alcohol or smoke tobacco products 24 hours prior to procedure.   Bring your picture ID and insurance card with you.  Wear comfortable clothing that can easily be removed. Preferably, something that zips, snaps, or buttons up the front.   You will be contacted by the hospital  for pre-admission COVID-19 testing and the day prior to your surgery to confirm details and give you specific instructions about when and where to arrive the day of your procedure.   If you are taking blood thinners including: Plavix, Eliquis, Xarelto, Coumadin, full strength Aspirin you will need to contact the prescribing provider for specific instructions on holding these medications for your procedure.  Inform your primary care  physician of your surgery and ask if him/her will need to see you prior to surgery.  If you develop symptoms of another illness prior to surgery please contact our office immediately.   If this is an inpatient surgery, attending the Surgical Oncology Pre-operative Education Class is strongly encouraged. Email [email or web address] to RSVP or for more class information.       Pre-Operative Testing  x CBC x CMP  BMP    PT, PTT, INR  UA x EKG    Chest X-Ray  Cardiac Clearance x H & P Medical Clearance     Jarrod Napier MD, FACS  Chief of Surgical Oncology  Co-Medical Director, Oncology Services  Professor of Surgery    For Dr. Napier's office: [phone]  Fax: [phone]  After hours you will reach the answering service    Pre-Admission [phone]  Central [phone]  Medical Records:   [phone]

## 2024-09-12 ENCOUNTER — OFFICE VISIT (OUTPATIENT)
Dept: HEMATOLOGY/ONCOLOGY | Facility: HOSPITAL | Age: 44
End: 2024-09-12
Attending: INTERNAL MEDICINE
Payer: COMMERCIAL

## 2024-09-12 VITALS
WEIGHT: 128.81 LBS | RESPIRATION RATE: 16 BRPM | OXYGEN SATURATION: 99 % | DIASTOLIC BLOOD PRESSURE: 68 MMHG | HEIGHT: 60 IN | BODY MASS INDEX: 25.29 KG/M2 | SYSTOLIC BLOOD PRESSURE: 107 MMHG | HEART RATE: 72 BPM | TEMPERATURE: 98 F

## 2024-09-12 DIAGNOSIS — C7A.8 PRIMARY MALIGNANT NEUROENDOCRINE TUMOR OF PANCREAS (HCC): Primary | ICD-10-CM

## 2024-09-12 DIAGNOSIS — D50.0 IRON DEFICIENCY ANEMIA DUE TO CHRONIC BLOOD LOSS: ICD-10-CM

## 2024-09-12 LAB
ALBUMIN SERPL-MCNC: 3.9 G/DL (ref 3.2–4.8)
ALBUMIN/GLOB SERPL: 1 {RATIO} (ref 1–2)
ALP LIVER SERPL-CCNC: 244 U/L
ALT SERPL-CCNC: 33 U/L
ANION GAP SERPL CALC-SCNC: 6 MMOL/L (ref 0–18)
AST SERPL-CCNC: 46 U/L (ref ?–34)
BASOPHILS # BLD AUTO: 0.04 X10(3) UL (ref 0–0.2)
BASOPHILS NFR BLD AUTO: 0.7 %
BILIRUB SERPL-MCNC: 0.8 MG/DL (ref 0.3–1.2)
BUN BLD-MCNC: 11 MG/DL (ref 9–23)
CALCIUM BLD-MCNC: 9.1 MG/DL (ref 8.7–10.4)
CHLORIDE SERPL-SCNC: 104 MMOL/L (ref 98–112)
CO2 SERPL-SCNC: 26 MMOL/L (ref 21–32)
CREAT BLD-MCNC: 0.65 MG/DL
EGFRCR SERPLBLD CKD-EPI 2021: 110 ML/MIN/1.73M2 (ref 60–?)
EOSINOPHIL # BLD AUTO: 0.14 X10(3) UL (ref 0–0.7)
EOSINOPHIL NFR BLD AUTO: 2.5 %
ERYTHROCYTE [DISTWIDTH] IN BLOOD BY AUTOMATED COUNT: 15.7 %
FASTING STATUS PATIENT QL REPORTED: NO
GLOBULIN PLAS-MCNC: 3.9 G/DL (ref 2–3.5)
GLUCOSE BLD-MCNC: 176 MG/DL (ref 70–99)
HCT VFR BLD AUTO: 29.4 %
HGB BLD-MCNC: 9.8 G/DL
IMM GRANULOCYTES # BLD AUTO: 0.04 X10(3) UL (ref 0–1)
IMM GRANULOCYTES NFR BLD: 0.7 %
LYMPHOCYTES # BLD AUTO: 0.37 X10(3) UL (ref 1–4)
LYMPHOCYTES NFR BLD AUTO: 6.6 %
MCH RBC QN AUTO: 29.3 PG (ref 26–34)
MCHC RBC AUTO-ENTMCNC: 33.3 G/DL (ref 31–37)
MCV RBC AUTO: 88 FL
MONOCYTES # BLD AUTO: 0.38 X10(3) UL (ref 0.1–1)
MONOCYTES NFR BLD AUTO: 6.8 %
NEUTROPHILS # BLD AUTO: 4.63 X10 (3) UL (ref 1.5–7.7)
NEUTROPHILS # BLD AUTO: 4.63 X10(3) UL (ref 1.5–7.7)
NEUTROPHILS NFR BLD AUTO: 82.7 %
OSMOLALITY SERPL CALC.SUM OF ELEC: 286 MOSM/KG (ref 275–295)
PLATELET # BLD AUTO: 130 10(3)UL (ref 150–450)
POTASSIUM SERPL-SCNC: 4.2 MMOL/L (ref 3.5–5.1)
PROT SERPL-MCNC: 7.8 G/DL (ref 5.7–8.2)
RBC # BLD AUTO: 3.34 X10(6)UL
SODIUM SERPL-SCNC: 136 MMOL/L (ref 136–145)
WBC # BLD AUTO: 5.6 X10(3) UL (ref 4–11)

## 2024-09-12 PROCEDURE — 99215 OFFICE O/P EST HI 40 MIN: CPT | Performed by: INTERNAL MEDICINE

## 2024-09-12 NOTE — PROGRESS NOTES
Hematology/Oncology Clinic Follow Up Visit    Patient Name: Lluvia Nieves  Medical Record Number: [id number]    YOB: 1980   PCP: PHYSICIAN NONSTAFF    Reason for Consultation:  Lluvia Nieves was seen today for the diagnosis of metastatic well differentiated grade 2 pancreatic neuroendocrine tumor     Oncologic History:  12/2023: saw PCP for routine lab work, elevated LFTs with T bili 2.6  12/29/23: US liver with 4.6cm hypoechoic stricture in region of pancreatic head, intrahepatic and extrahepatic biliary ductal dilation  1/6/24: CT A/P with 3.3x2.8x3.7cm hypoenhancing mass in pancreatic head, enlarged peripancreatic lymph nodes, 1.8x1.8cm hypoenhancing mass in R hepatic lobe  1/10/24: ERCP with EUS showed 3.3cm hyopechoic mass at head of pancreas s/p sphincterotomy and metal bile duct stent placement. Path of pancreatic head mass and periportal lymph node both consistent with well differentiated, grade 2 neuroendocrine tumor, Ki-67 3%  1/31/24: Dotatate PET with avid pancreatic head mass, diffuse lymph node metastases of left supraclavicular region, mediastinum, hilum, retroperitoneum and mesentery  2/14/24: C1 lanreotide  3/13/24: C2 lanreotide  4/12/24: C3 lanreotide  4/23/24: s/p liver biopsy with Dr Magdaleno, path positive for primary biliary cholangitis  5/9/24: C4 lanreotide  5/17/24: CT enterography with disease progression in pancreas, abdominal/retroperitoneal adenopathy  6/21/24: C1 Lutathera  8/16/24: C2 Lutathera    History of Present Illness:      43 y/o F PMH 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    - reports abdominal discomfort is significantly improved today; she previously had some RUQ discomfort intermittent every 2 hours  - here for her monthly shot; next Lutathera 10/11  - she is getting repeat MRCP 9/15/24  - ERCP With Dr Grady on 9/17/24  - her recent LFTs show increase of bilirubin to  2.5  - she saw Dr Napier who recommended observation for now of her gallbladder    Past Medical History:  Past Medical History:    Abdominal pain    Blood disorder    anemia    Blood in the stool    Cancer (HCC)    pancreas    Constipation    Disorder of liver    elevated enzymes    Easy bruising    Exposure to medical diagnostic radiation    last tx 8/16/2024    Flatulence/gas pain/belching    Headache disorder    Heartburn    Heavy menses    Hemorrhoids    Indigestion    Irregular bowel habits    Itch of skin    Leg swelling    Menses painful    Nausea    Stool incontinence    Stress    Uncomfortable fullness after meals    Visual impairment    reading glasses    Vomiting    Wears glasses    Weight loss     Past Surgical History:   Procedure Laterality Date    Needle biopsy left      Tubal ligation         Home Medications:   Multiple Vitamins-Minerals (MULTI-VITAMIN/MINERALS) Oral Tab Take 1 tablet by mouth daily.      prochlorperazine (COMPAZINE) 10 mg tablet Take 1 tablet (10 mg total) by mouth every 6 (six) hours as needed for Nausea. 60 tablet 1    Ursodiol 500 MG Oral Tab Take 1 tablet (500 mg total) by mouth in the morning and 1 tablet (500 mg total) before bedtime. 180 tablet 3    NON FORMULARY Unsure of name of medication every 4 weeks injection for cancer of pancreas.         Allergies:   No Known Allergies    Psychosocial History:  Social History     Socioeconomic History    Marital status:      Spouse name: Not on file    Number of children: Not on file    Years of education: Not on file    Highest education level: Not on file   Occupational History    Not on file   Tobacco Use    Smoking status: Never    Smokeless tobacco: Never   Vaping Use    Vaping status: Never Used   Substance and Sexual Activity    Alcohol use: Not Currently    Drug use: Never    Sexual activity: Not on file   Other Topics Concern    Not on file   Social History Narrative    Not on file     Social Determinants of Health      Financial Resource Strain: Not on file   Food Insecurity: Food Insecurity Present (2/9/2024)    Received from [facility], [facility]    Food Insecurity     Currently or in the past 3 months, have you worried your food would run out before you had money to buy more?: Yes     In the past 12 months, have you run out of food or been unable to get more?: No   Transportation Needs: No Transportation Needs (2/9/2024)    Received from [facility], [facility]    Transportation Needs     Currently or in the past 3 months, has lack of transportation kept you from medical appointments, getting food or medicine, or providing care to a family member?: Not on file     : Not on file     Medical Transportation Needs?: No     Daily Living Transportation Needs? [Peds Only] : Not on file   Physical Activity: Not on file   Stress: Not on file   Social Connections: Unknown (3/13/2021)    Received from [facility], [facility]    Social Connections     Conversations with friends/family/neighbors per week: Not on file   Housing Stability: Low Risk  (11/10/2023)    Received from [facility], [facility]    Housing Stability     Mortgage Payment Concerns?: Not on file     Number of Places Lived in the Last Year: Not on file     Unstable Housing?: Not on file       Family Medical History:  No family history on file.    Review of Systems:  A 10-point ROS was done with pertinent positives and negative per the HPI    Vital Signs:  Height: 152.4 cm (5') (09/12 0956)  Weight: 58.4 kg (128 lb 12.8 oz) (09/12 0956)  BSA (Calculated - sq m): 1.55 sq meters (09/12 0956)  Pulse: 72 (09/12 0956)  BP: 107/68 (09/12 0956)  Temp: 97.7 °F (36.5 °C) (09/12 0956)  Do Not Use - Resp Rate: --  SpO2: 99 % (09/12 0956)    Wt Readings from Last 6 Encounters:   09/12/24 58.4 kg (128 lb 12.8 oz)   09/04/24  57.6 kg (127 lb)   09/04/24 59 kg (130 lb)   08/15/24 57.7 kg (127 lb 3.2 oz)   07/18/24 61.1 kg (134 lb 12.8 oz)   06/21/24 63.1 kg (139 lb 3.2 oz)     Physical Examination:  General: Patient is alert and oriented, not in acute distress  Psych: Mood and affect are appropriate  Eyes: EOMI, PERRL  ENT: Oropharynx is clear, no adenopathy  CV: no LE edema  Respiratory: Non-labored respirations  GI/Abd: Soft, non-tender  Neurological: Grossly intact   Lymphatics: No palpable inguinal lymphadenopathy  Skin: no rashes or petechiae    Laboratory:  Lab Results   Component Value Date    WBC 5.0 08/15/2024    WBC 7.8 07/29/2024    WBC 5.1 07/18/2024    HGB 9.6 (L) 08/15/2024    HGB 10.0 (L) 07/29/2024    HGB 10.7 (L) 07/18/2024    HCT 29.2 (L) 08/15/2024    MCV 88.0 08/15/2024    MCH 28.9 08/15/2024    MCHC 32.9 08/15/2024    RDW 14.7 08/15/2024    .0 08/15/2024    .0 07/29/2024    .0 (L) 07/18/2024     Lab Results   Component Value Date     (H) 08/15/2024    BUN 10 08/15/2024    CREATSERUM 0.57 08/15/2024    CREATSERUM 0.54 (L) 07/29/2024    CREATSERUM 0.56 07/18/2024    ANIONGAP 7 08/15/2024    CA 9.2 08/15/2024    OSMOCALC 285 08/15/2024    ALKPHO 476 (H) 08/15/2024    AST 70 (H) 08/15/2024    ALT 79 (H) 08/15/2024    BILT 2.5 (H) 08/15/2024    TP 7.8 08/15/2024    ALB 3.8 08/15/2024    GLOBULIN 4.0 (H) 08/15/2024     (L) 08/15/2024    K 3.4 (L) 08/15/2024     08/15/2024    CO2 24.0 08/15/2024     Lab Results   Component Value Date    INR 1.05 04/23/2024         Impression & Plan:     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, grade 2  - metastatic to liver and diffuse lymphadenopathy (ki-67 3%)  - s/p metal biliary stent placement 1/10/24  - baseline chromogranin A 229  - previously discussed incurable prognosis and palliative intent of treatment  - progressed on 1L single agent lanreotide  - received C1 Lutathera 6/21/24, C2 8/16/24  - next dose of Lutathera  10/11/24  - receiving SSA at [facility] due to insurance tomorrow     Elevated LFTs  - MRI 7/19/2024 showed thickening/hyperemia of gallbladder fundus, no biliary obstruction.   - her LFTs today show significant improvement with normalization of bilirubin. Possibly she may have passed gallstones or there was inflammation that subsided. She has upcoming ERCP with Dr Grady. Discussed she may have had obstruction of her prior biliary stent.    Iron deficiency anemia  - s/p IV iron dextran 2/14/24, IV infed 6/18/24  - recheck iron studies today    Primary biliary cholangitis  - on ursodiol; following with Dr Magdaleno    F/u: 1 month    Eduar Alfred  Hematology/Medical Oncology  [facility]

## 2024-09-12 NOTE — PROGRESS NOTES
Education Record    Learner:  Patient    Disease / Diagnosis: pancreatic neuroendocrine cancer    Barriers / Limitations:  None   Comments:    Method:  Discussion   Comments:    General Topics:  Side effects and symptom management and Plan of care reviewed   Comments:    Outcome:  Shows understanding   Comments:    Here for follow up and labs. Feels fatigued and dizzy at times. Eating well. Bowels moving normally. Pain stable in abdomen. Has occasional nausea- does not need to take anything.

## 2024-09-13 ENCOUNTER — NURSE ONLY (OUTPATIENT)
Dept: HEMATOLOGY/ONCOLOGY | Facility: HOSPITAL | Age: 44
End: 2024-09-13
Attending: INTERNAL MEDICINE
Payer: COMMERCIAL

## 2024-09-13 DIAGNOSIS — C7A.8 NEUROENDOCRINE CARCINOMA METASTATIC TO INTRA-ABDOMINAL LYMPH NODE (HCC): ICD-10-CM

## 2024-09-13 DIAGNOSIS — C7B.8 METASTATIC MALIGNANT NEUROENDOCRINE TUMOR TO LIVER (HCC): Primary | ICD-10-CM

## 2024-09-13 DIAGNOSIS — C7B.8 NEUROENDOCRINE CARCINOMA METASTATIC TO INTRA-ABDOMINAL LYMPH NODE (HCC): ICD-10-CM

## 2024-09-13 DIAGNOSIS — C7A.8 PRIMARY MALIGNANT NEUROENDOCRINE TUMOR OF PANCREAS (HCC): ICD-10-CM

## 2024-09-13 PROCEDURE — 96372 THER/PROPH/DIAG INJ SC/IM: CPT

## 2024-09-13 RX ORDER — OCTREOTIDE ACETATE 500 UG/ML
300 INJECTION, SOLUTION INTRAVENOUS; SUBCUTANEOUS ONCE
OUTPATIENT
Start: 2024-10-08

## 2024-09-13 RX ORDER — PROCHLORPERAZINE MALEATE 10 MG
10 TABLET ORAL ONCE
OUTPATIENT
Start: 2024-10-08

## 2024-09-13 RX ORDER — ARGININE/LYSINE/STERILE WATER 25-25MG/ML
1000 PLASTIC BAG, INJECTION (ML) INTRAVENOUS ONCE
OUTPATIENT
Start: 2024-10-08

## 2024-09-13 RX ORDER — ONDANSETRON 2 MG/ML
8 INJECTION INTRAMUSCULAR; INTRAVENOUS ONCE
OUTPATIENT
Start: 2024-10-08

## 2024-09-13 RX ORDER — DEXAMETHASONE SODIUM PHOSPHATE 10 MG/ML
10 INJECTION, SOLUTION INTRAMUSCULAR; INTRAVENOUS ONCE
OUTPATIENT
Start: 2024-10-08

## 2024-09-14 LAB — CHROMOGRANIN A: 81.2 NG/ML

## 2024-09-15 ENCOUNTER — HOSPITAL ENCOUNTER (OUTPATIENT)
Dept: MRI IMAGING | Facility: HOSPITAL | Age: 44
End: 2024-09-15
Attending: INTERNAL MEDICINE
Payer: COMMERCIAL

## 2024-09-15 ENCOUNTER — HOSPITAL ENCOUNTER (OUTPATIENT)
Dept: MRI IMAGING | Facility: HOSPITAL | Age: 44
Discharge: HOME | End: 2024-09-15
Attending: INTERNAL MEDICINE
Payer: COMMERCIAL

## 2024-09-15 DIAGNOSIS — K74.3 PRIMARY BILIARY CHOLANGITIS (HCC): ICD-10-CM

## 2024-09-15 DIAGNOSIS — K86.89 PANCREATIC MASS (HCC): ICD-10-CM

## 2024-09-15 DIAGNOSIS — R93.2 ABNORMAL FINDINGS ON DIAGNOSTIC IMAGING OF GALLBLADDER: ICD-10-CM

## 2024-09-15 PROCEDURE — 76376 3D RENDER W/INTRP POSTPROCES: CPT | Performed by: INTERNAL MEDICINE

## 2024-09-15 PROCEDURE — A9575 INJ GADOTERATE MEGLUMI 0.1ML: HCPCS

## 2024-09-15 PROCEDURE — 74183 MRI ABD W/O CNTR FLWD CNTR: CPT | Performed by: INTERNAL MEDICINE

## 2024-09-15 RX ORDER — GADOTERATE MEGLUMINE 376.9 MG/ML
15 INJECTION INTRAVENOUS
Status: COMPLETED | OUTPATIENT
Start: 2024-09-15 | End: 2024-09-15

## 2024-09-15 RX ADMIN — GADOTERATE MEGLUMINE 12 ML: 376.9 INJECTION INTRAVENOUS at 10:33:00

## 2024-09-17 ENCOUNTER — ANESTHESIA (OUTPATIENT)
Dept: ENDOSCOPY | Facility: HOSPITAL | Age: 44
End: 2024-09-17
Payer: COMMERCIAL

## 2024-09-17 ENCOUNTER — APPOINTMENT (OUTPATIENT)
Dept: GENERAL RADIOLOGY | Facility: HOSPITAL | Age: 44
End: 2024-09-17
Attending: INTERNAL MEDICINE
Payer: COMMERCIAL

## 2024-09-17 ENCOUNTER — ANESTHESIA EVENT (OUTPATIENT)
Dept: ENDOSCOPY | Facility: HOSPITAL | Age: 44
End: 2024-09-17
Payer: COMMERCIAL

## 2024-09-17 ENCOUNTER — HOSPITAL ENCOUNTER (OUTPATIENT)
Facility: HOSPITAL | Age: 44
Setting detail: HOSPITAL OUTPATIENT SURGERY
Discharge: HOME | End: 2024-09-17
Attending: INTERNAL MEDICINE | Admitting: INTERNAL MEDICINE
Payer: COMMERCIAL

## 2024-09-17 VITALS
DIASTOLIC BLOOD PRESSURE: 59 MMHG | WEIGHT: 130 LBS | SYSTOLIC BLOOD PRESSURE: 101 MMHG | RESPIRATION RATE: 15 BRPM | HEART RATE: 77 BPM | TEMPERATURE: 99 F | OXYGEN SATURATION: 99 % | BODY MASS INDEX: 25.52 KG/M2 | HEIGHT: 60 IN

## 2024-09-17 LAB — B-HCG UR QL: NEGATIVE

## 2024-09-17 PROCEDURE — 0FC98ZZ EXTIRPATION OF MATTER FROM COMMON BILE DUCT, VIA NATURAL OR ARTIFICIAL OPENING ENDOSCOPIC: ICD-10-PCS | Performed by: INTERNAL MEDICINE

## 2024-09-17 PROCEDURE — 0F798DZ DILATION OF COMMON BILE DUCT WITH INTRALUMINAL DEVICE, VIA NATURAL OR ARTIFICIAL OPENING ENDOSCOPIC: ICD-10-PCS | Performed by: INTERNAL MEDICINE

## 2024-09-17 PROCEDURE — 81025 URINE PREGNANCY TEST: CPT

## 2024-09-17 PROCEDURE — 74328 X-RAY BILE DUCT ENDOSCOPY: CPT | Performed by: INTERNAL MEDICINE

## 2024-09-17 DEVICE — STENT SYSTEM RMV
Type: IMPLANTABLE DEVICE | Status: FUNCTIONAL
Brand: WALLFLEX BILIARY

## 2024-09-17 RX ORDER — INDOMETHACIN 100 MG
100 SUPPOSITORY, RECTAL RECTAL ONCE
Status: DISCONTINUED | OUTPATIENT
Start: 2024-09-17 | End: 2024-09-17

## 2024-09-17 RX ORDER — SODIUM CHLORIDE, SODIUM LACTATE, POTASSIUM CHLORIDE, CALCIUM CHLORIDE 600; 310; 30; 20 MG/100ML; MG/100ML; MG/100ML; MG/100ML
100 INJECTION, SOLUTION INTRAVENOUS CONTINUOUS
Status: DISCONTINUED | OUTPATIENT
Start: 2024-09-17 | End: 2024-09-17

## 2024-09-17 RX ORDER — SODIUM CHLORIDE, SODIUM LACTATE, POTASSIUM CHLORIDE, CALCIUM CHLORIDE 600; 310; 30; 20 MG/100ML; MG/100ML; MG/100ML; MG/100ML
INJECTION, SOLUTION INTRAVENOUS CONTINUOUS
Status: DISCONTINUED | OUTPATIENT
Start: 2024-09-17 | End: 2024-09-17

## 2024-09-17 RX ORDER — SODIUM CHLORIDE, SODIUM LACTATE, POTASSIUM CHLORIDE, CALCIUM CHLORIDE 600; 310; 30; 20 MG/100ML; MG/100ML; MG/100ML; MG/100ML
INJECTION, SOLUTION INTRAVENOUS CONTINUOUS
OUTPATIENT
Start: 2024-09-17

## 2024-09-17 RX ORDER — NALOXONE HYDROCHLORIDE 0.4 MG/ML
0.08 INJECTION, SOLUTION INTRAMUSCULAR; INTRAVENOUS; SUBCUTANEOUS ONCE AS NEEDED
OUTPATIENT
Start: 2024-09-17 | End: 2024-09-17

## 2024-09-17 RX ORDER — LIDOCAINE HYDROCHLORIDE 10 MG/ML
INJECTION, SOLUTION EPIDURAL; INFILTRATION; INTRACAUDAL; PERINEURAL AS NEEDED
Status: DISCONTINUED | OUTPATIENT
Start: 2024-09-17 | End: 2024-09-17 | Stop reason: SURG

## 2024-09-17 RX ADMIN — SODIUM CHLORIDE, SODIUM LACTATE, POTASSIUM CHLORIDE, CALCIUM CHLORIDE: 600; 310; 30; 20 INJECTION, SOLUTION INTRAVENOUS at 12:31:00

## 2024-09-17 RX ADMIN — SODIUM CHLORIDE, SODIUM LACTATE, POTASSIUM CHLORIDE, CALCIUM CHLORIDE: 600; 310; 30; 20 INJECTION, SOLUTION INTRAVENOUS at 12:45:00

## 2024-09-17 RX ADMIN — LIDOCAINE HYDROCHLORIDE 5 ML: 10 INJECTION, SOLUTION EPIDURAL; INFILTRATION; INTRACAUDAL; PERINEURAL at 12:36:00

## 2024-09-17 NOTE — H&P
[facility]  Pre-op H and P    Lluvia Nieves Patient Status:  Hospital Outpatient Surgery    1980 MRN [id number]   Location [facility] Attending Diego Grady MD   Date 2024 PCP PHYSICIAN NONSTAFF     CC: 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, grade 2  - metastatic to liver and diffuse lymphadenopathy (ki-67 3%)  - s/p metal biliary stent placement 1/10/24 biliary fully covered metal stent size 10 mm x 80 mm successfully   Abnormal LFT's    History of Present Illness:  Lluvia Nieves is a a(n) 44 year old female. 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, grade 2  - metastatic to liver and diffuse lymphadenopathy (ki-67 3%)  - s/p metal biliary stent placement 1/10/24 biliary fully covered metal stent size 10 mm x 80 mm successfully   Abnormal LFT's    History:  Past Medical History:    Abdominal pain    Blood disorder    anemia    Blood in the stool    Cancer (HCC)    pancreas    Constipation    Disorder of liver    elevated enzymes    Easy bruising    Exposure to medical diagnostic radiation    last tx 2024    Flatulence/gas pain/belching    Headache disorder    Heartburn    Heavy menses    Hemorrhoids    Indigestion    Irregular bowel habits    Itch of skin    Leg swelling    Menses painful    Nausea    Stool incontinence    Stress    Uncomfortable fullness after meals    Visual impairment    reading glasses    Vomiting    Wears glasses    Weight loss     Past Surgical History:   Procedure Laterality Date    Needle biopsy left      Tubal ligation       History reviewed. No pertinent family history.   reports that she has never smoked. She has never used smokeless tobacco. She reports that she does not currently use alcohol. She reports that she does not use drugs.    Allergies:  No Known Allergies    Medications:    Current Facility-Administered Medications:     lactated ringers infusion, , Intravenous,  Continuous    lactated ringers IV bolus 1,000 mL, 1,000 mL, Intravenous, Once **FOLLOWED BY** lactated ringers infusion, 100 mL/hr, Intravenous, Continuous    indomethacin (Indocin) 100 MG rectal suppository 100 mg, 100 mg, Rectal, Once    Physical Exam:    Blood pressure 98/63, pulse 73, temperature 98.8 °F (37.1 °C), temperature source Temporal, resp. rate 16, height 5' (1.524 m), weight 130 lb (59 kg), last menstrual period 08/05/2024, SpO2 99%.    General: Appears alert, oriented x3 and in no acute distress.  CV: Normal rate   Lungs: Normal effort   Skin: Warm and dry.  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

## 2024-09-17 NOTE — H&P (VIEW-ONLY)
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, grade 2  - metastatic to liver and diffuse lymphadenopathy (ki-67 3%)  - s/p metal biliary stent placement 1/10/24 biliary fully covered metal stent size 10 mm x 80 mm successfully   Abnormal LFT's    History:  Past Medical History:    Abdominal pain    Blood disorder    anemia    Blood in the stool    Cancer (HCC)    pancreas    Constipation    Disorder of liver    elevated enzymes    Easy bruising    Exposure to medical diagnostic radiation    last tx 2024    Flatulence/gas pain/belching    Headache disorder    Heartburn    Heavy menses    Hemorrhoids    Indigestion    Irregular bowel habits    Itch of skin    Leg swelling    Menses painful    Nausea    Stool incontinence    Stress    Uncomfortable fullness after meals    Visual impairment    reading glasses    Vomiting    Wears glasses    Weight loss     Past Surgical History:   Procedure Laterality Date    Needle biopsy left      Tubal ligation       History reviewed. No pertinent family history.   reports that she has never smoked. She has never used smokeless tobacco. She reports that she does not currently use alcohol. She reports that she does not use drugs.    Allergies:  No Known Allergies    Medications:    Current Facility-Administered Medications:     lactated ringers infusion, , Intravenous,  Continuous    lactated ringers IV bolus 1,000 mL, 1,000 mL, Intravenous, Once **FOLLOWED BY** lactated ringers infusion, 100 mL/hr, Intravenous, Continuous    indomethacin (Indocin) 100 MG rectal suppository 100 mg, 100 mg, Rectal, Once    Physical Exam:    Blood pressure 98/63, pulse 73, temperature 98.8 °F (37.1 °C), temperature source Temporal, resp. rate 16, height 5' (1.524 m), weight 130 lb (59 kg), last menstrual period 08/05/2024, SpO2 99%.    General: Appears alert, oriented x3 and in no acute distress.  CV: Normal rate   Lungs: Normal effort   Skin: Warm and dry.  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

## 2024-09-17 NOTE — OPERATIVE REPORT
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, grade 2  - metastatic to liver and diffuse lymphadenopathy (ki-67 3%)  - s/p metal biliary stent placement 1/10/24 biliary fully covered metal stent size 10 mm x 80 mm successfully   Abnormal LFT's    POSTOPERTATIVE DIAGNOSIS: Migrated previously placed stent, biliary stricture, sludge  PROCEDURE PERFORMED: ERCP with balloon sweep and stent placement  TIME OUT WAS PERFORMED    SEDATION: MAC sedation provided by General Anesthesia    INFORMED CONSENT: Risks, benefits and alternatives to the procedure were explained to the patient including but not limited to bleeding, infection, perforation, adverse drug reactions, pancreatitis and the need for hospitalization and surgery if this occurs, the patient understands and agrees to procedure.  PROCEDURE DESCRIPTION: The therapeutic side viewing duodenoscope was introduced into the patient’s mouth, hypo pharynx, esophagus, stomach and the first and second portion of the duodenum, straightening of the endoscope was performed to obtain a direct view of the major ampulla. Careful but limited examination of the above described areas was performed on withdrawal of the endoscope. The patient tolerated the procedure well and there were no immediate complications noted during the procedure, the patient was transported to the recovery area in stable condition.  FINDINGS:  DUODENUM: Normal  MAJOR AMPULLA: Post sphincterotomy changes  ERP: Not attempted  ERC: The CBD appear wnl, the distal CHD showed a short 1 cm stricture with upstream dilatation of the proximal CHD and the confluence of the right and left hepatic ducts, the intrahepatics appear wnl, the cystic duct appear  wnl and the gallbladder was not visualized.  THERAPEUTICS: The CBD was cannulated using a standard 0.035 [employer]  triple lumen balloon, deep cannulation was performed with the help of a 0.035 straight [employer] wire 260cm in length. Biliary balloon sweep and occlusion cholangiogram was performed the help of [employer]’s 9-12mm triple lumen stone extraction balloon, mild sludge was noted. We placed a biliary fully covered wallstent size 10 x 60 mm successfully; at the end of the procedure the proximal end was located at the proximal CHD and the distal end in the duodenum.  RECOMMENDATIONS:   Post ERCP precautions, watch for bleeding, infection, perforation, adverse drug reactions and pancreatitis.  Call status report post procedure.    Diego Grady MD  9/17/2024  12:45 PM

## 2024-09-17 NOTE — DISCHARGE INSTRUCTIONS
Home Care Instructions for Endoscopic Retrograde Cholangiopancreatography with Sedation    Diet:  - Resume your regular diet as tolerated unless otherwise instructed.  - Start with light meals to minimize bloating.  - Do not drink alcohol today.    Medication:  - If you have questions about resuming your normal medications, please contact your Primary Care Physician.    Activities:  - Take it easy today. Do not return to work today.  - Do not drive today.  - Do not operate any machinery today (including kitchen equipment).    Endoscopic Retrograde Cholangiopancreatography:  - You may have a sore throat for 2-3 days following the exam. This is normal. Gargling with warm salt water (1/2 tsp salt to 1 glass warm water) or using throat lozenges will help.  - If you experience any sharp pain in your neck, abdomen or chest, vomiting of blood, oral temperature over 100 degrees Fahrenheit, light-headedness or dizziness, or any other problems, contact your doctor.    **If unable to reach your doctor, please go to the [facility] Emergency Room**    - Your referring physician will receive a full report of your examination.  - If you do not hear from your doctor's office within two weeks of your biopsy, please call them for your results.

## 2024-09-17 NOTE — ANESTHESIA PREPROCEDURE EVALUATION
PRE-OP EVALUATION    Patient Name: Lluvia Nieves    Admit Diagnosis: Abnormal LFTs [R79.89]    Pre-op Diagnosis: Abnormal LFTs [R79.89]    ENDOSCOPIC RETROGRADE CHOLANGIOPANCREATOGRAPHY (ERCP) WITH BALLOON CHOLANGIOGRAM AND FULLY COVERED BILIARY STENT PLACEMENT    Anesthesia Procedure: ENDOSCOPIC RETROGRADE CHOLANGIOPANCREATOGRAPHY (ERCP) WITH BALLOON CHOLANGIOGRAM AND FULLY COVERED BILIARY STENT PLACEMENT    Surgeons and Role:     * Diego Grady MD - Primary    Pre-op vitals reviewed.  Temp: 98.8 °F (37.1 °C)  Pulse: 73  Resp: 16  BP: 98/63  SpO2: 99 %  Body mass index is 25.39 kg/m².    Current medications reviewed.  Hospital Medications:  • lactated ringers infusion   Intravenous Continuous   • lactated ringers IV bolus 1,000 mL  1,000 mL Intravenous Once    Followed by   • lactated ringers infusion  100 mL/hr Intravenous Continuous   • indomethacin (Indocin) 100 MG rectal suppository 100 mg  100 mg Rectal Once       Outpatient Medications:     Medications Prior to Admission   Medication Sig Dispense Refill Last Dose   • Multiple Vitamins-Minerals (MULTI-VITAMIN/MINERALS) Oral Tab Take 1 tablet by mouth daily.   Past Week   • Ursodiol 500 MG Oral Tab Take 1 tablet (500 mg total) by mouth in the morning and 1 tablet (500 mg total) before bedtime. 180 tablet 3 9/16/2024   • NON FORMULARY Unsure of name of medication every 4 weeks injection for cancer of pancreas.      • prochlorperazine (COMPAZINE) 10 mg tablet Take 1 tablet (10 mg total) by mouth every 6 (six) hours as needed for Nausea. (Patient not taking: Reported on 9/12/2024) 60 tablet 1 More than a month       Allergies: Patient has no known allergies.      Anesthesia Evaluation    Patient summary reviewed.    Anesthetic Complications  (-) history of anesthetic complications         GI/Hepatic/Renal      (+) GERD          (+) liver disease                 Cardiovascular        Exercise tolerance: good     MET: >4                                            Endo/Other                                  Pulmonary                           Neuro/Psych                   (+) headaches           Neuroendocrine tumor of the liver        Past Surgical History:   Procedure Laterality Date   • Needle biopsy left     • Tubal ligation       Social History     Socioeconomic History   • Marital status:    Tobacco Use   • Smoking status: Never   • Smokeless tobacco: Never   Vaping Use   • Vaping status: Never Used   Substance and Sexual Activity   • Alcohol use: Not Currently   • Drug use: Never     History   Drug Use Unknown     Available pre-op labs reviewed.  Lab Results   Component Value Date    WBC 5.6 09/12/2024    RBC 3.34 (L) 09/12/2024    HGB 9.8 (L) 09/12/2024    HCT 29.4 (L) 09/12/2024    MCV 88.0 09/12/2024    MCH 29.3 09/12/2024    MCHC 33.3 09/12/2024    RDW 15.7 09/12/2024    .0 (L) 09/12/2024     Lab Results   Component Value Date     09/12/2024    K 4.2 09/12/2024     09/12/2024    CO2 26.0 09/12/2024    BUN 11 09/12/2024    CREATSERUM 0.65 09/12/2024     (H) 09/12/2024    CA 9.1 09/12/2024            Airway      Mallampati: II  Mouth opening: >3 FB  TM distance: > 6 cm   Cardiovascular    Cardiovascular exam normal.         Dental  Comment: Discussed risk of dental injury with anesthesia ehsan to weakened teeth.            Pulmonary    Pulmonary exam normal.                 Other findings        ASA: 2   Plan: MAC  NPO status verified and patient meets guidelines.          Plan/risks discussed with: patient            Present on Admission:  **None**

## 2024-09-17 NOTE — ANESTHESIA POSTPROCEDURE EVALUATION
[facility]    Lluvia Nieves Patient Status:  Hospital Outpatient Surgery   Age/Gender 44 year old female MRN [id number]   Location [facility] ENDOSCOPY PAIN CENTER Attending Diego Grady MD   Hosp Day # 0 PCP PHYSICIAN NONSTAFF       Anesthesia Post-op Note    ENDOSCOPIC RETROGRADE CHOLANGIOPANCREATOGRAPHY (ERCP) WITH BALLOON CHOLANGIOGRAM AND FULLY COVERED BILIARY STENT PLACEMENT    Procedure Summary       Date: 09/17/24 Room / Location:  ENDOSCOPY 01 / [facility]    Anesthesia Start: 1231 Anesthesia Stop: 1251    Procedure: ENDOSCOPIC RETROGRADE CHOLANGIOPANCREATOGRAPHY (ERCP) WITH BALLOON CHOLANGIOGRAM AND FULLY COVERED BILIARY STENT PLACEMENT Diagnosis:       Abnormal LFTs      (BILIARY STRICTURE)    Surgeons: Diego Grady MD Anesthesiologist: Jacques Ruffin MD    Anesthesia Type: MAC ASA Status: 2            Anesthesia Type: MAC    Vitals Value Taken Time   /59 09/17/24 1312   Temp  09/17/24 1333   Pulse 77 09/17/24 1315   Resp 15 09/17/24 1250   SpO2 99 % 09/17/24 1315       Patient Location: Endoscopy    Anesthesia Type: MAC    Airway Patency: patent    Postop Pain Control: adequate    Mental Status: preanesthetic baseline    Nausea/Vomiting: none    Cardiopulmonary/Hydration status: stable euvolemic    Complications: no apparent anesthesia related complications    Postop vital signs: stable    Dental Exam: Unchanged from Preop    Patient to be discharged home when criteria met.

## 2024-09-24 ENCOUNTER — LAB ENCOUNTER (OUTPATIENT)
Dept: LAB | Age: 44
End: 2024-09-24
Attending: PHYSICIAN ASSISTANT
Payer: COMMERCIAL

## 2024-09-24 DIAGNOSIS — C7B.8 NEUROENDOCRINE CARCINOMA METASTATIC TO INTRA-ABDOMINAL LYMPH NODE (HCC): ICD-10-CM

## 2024-09-24 DIAGNOSIS — C7A.8 PRIMARY MALIGNANT NEUROENDOCRINE TUMOR OF PANCREAS (HCC): ICD-10-CM

## 2024-09-24 DIAGNOSIS — C7A.8 NEUROENDOCRINE CARCINOMA METASTATIC TO INTRA-ABDOMINAL LYMPH NODE (HCC): ICD-10-CM

## 2024-09-24 DIAGNOSIS — C7B.8 METASTATIC MALIGNANT NEUROENDOCRINE TUMOR TO LIVER (HCC): ICD-10-CM

## 2024-09-24 LAB
ALBUMIN SERPL-MCNC: 3.7 G/DL (ref 3.2–4.8)
ALBUMIN/GLOB SERPL: 0.9 {RATIO} (ref 1–2)
ALP LIVER SERPL-CCNC: 324 U/L
ALT SERPL-CCNC: 22 U/L
ANION GAP SERPL CALC-SCNC: 8 MMOL/L (ref 0–18)
AST SERPL-CCNC: 24 U/L (ref ?–34)
BASOPHILS # BLD AUTO: 0.04 X10(3) UL (ref 0–0.2)
BASOPHILS NFR BLD AUTO: 0.6 %
BILIRUB SERPL-MCNC: 0.8 MG/DL (ref 0.3–1.2)
BUN BLD-MCNC: 10 MG/DL (ref 9–23)
CALCIUM BLD-MCNC: 9.3 MG/DL (ref 8.7–10.4)
CHLORIDE SERPL-SCNC: 104 MMOL/L (ref 98–112)
CO2 SERPL-SCNC: 26 MMOL/L (ref 21–32)
CREAT BLD-MCNC: 0.49 MG/DL
EGFRCR SERPLBLD CKD-EPI 2021: 118 ML/MIN/1.73M2 (ref 60–?)
EOSINOPHIL # BLD AUTO: 0.25 X10(3) UL (ref 0–0.7)
EOSINOPHIL NFR BLD AUTO: 4.1 %
ERYTHROCYTE [DISTWIDTH] IN BLOOD BY AUTOMATED COUNT: 15.4 %
FASTING STATUS PATIENT QL REPORTED: YES
GLOBULIN PLAS-MCNC: 4.3 G/DL (ref 2–3.5)
GLUCOSE BLD-MCNC: 132 MG/DL (ref 70–99)
HCT VFR BLD AUTO: 30.5 %
HGB BLD-MCNC: 9.9 G/DL
IMM GRANULOCYTES # BLD AUTO: 0.06 X10(3) UL (ref 0–1)
IMM GRANULOCYTES NFR BLD: 1 %
LYMPHOCYTES # BLD AUTO: 0.37 X10(3) UL (ref 1–4)
LYMPHOCYTES NFR BLD AUTO: 6 %
MCH RBC QN AUTO: 29.1 PG (ref 26–34)
MCHC RBC AUTO-ENTMCNC: 32.5 G/DL (ref 31–37)
MCV RBC AUTO: 89.7 FL
MONOCYTES # BLD AUTO: 0.45 X10(3) UL (ref 0.1–1)
MONOCYTES NFR BLD AUTO: 7.3 %
NEUTROPHILS # BLD AUTO: 4.99 X10 (3) UL (ref 1.5–7.7)
NEUTROPHILS # BLD AUTO: 4.99 X10(3) UL (ref 1.5–7.7)
NEUTROPHILS NFR BLD AUTO: 81 %
OSMOLALITY SERPL CALC.SUM OF ELEC: 287 MOSM/KG (ref 275–295)
PLATELET # BLD AUTO: 221 10(3)UL (ref 150–450)
POTASSIUM SERPL-SCNC: 3.8 MMOL/L (ref 3.5–5.1)
PROT SERPL-MCNC: 8 G/DL (ref 5.7–8.2)
RBC # BLD AUTO: 3.4 X10(6)UL
SODIUM SERPL-SCNC: 138 MMOL/L (ref 136–145)
WBC # BLD AUTO: 6.2 X10(3) UL (ref 4–11)

## 2024-09-24 PROCEDURE — 85025 COMPLETE CBC W/AUTO DIFF WBC: CPT

## 2024-09-24 PROCEDURE — 36415 COLL VENOUS BLD VENIPUNCTURE: CPT

## 2024-09-24 PROCEDURE — 80053 COMPREHEN METABOLIC PANEL: CPT

## 2024-09-30 RX ORDER — LANREOTIDE ACETATE 120 MG/.5ML
120 INJECTION SUBCUTANEOUS
COMMUNITY

## 2024-09-30 RX ORDER — FERROUS SULFATE 325(65) MG
325 TABLET, DELAYED RELEASE (ENTERIC COATED) ORAL
COMMUNITY

## 2024-09-30 RX ORDER — ASCORBIC ACID 500 MG
500 TABLET ORAL DAILY
COMMUNITY

## 2024-10-08 ENCOUNTER — OFFICE VISIT (OUTPATIENT)
Dept: HEMATOLOGY/ONCOLOGY | Facility: HOSPITAL | Age: 44
End: 2024-10-08
Attending: INTERNAL MEDICINE
Payer: COMMERCIAL

## 2024-10-08 VITALS
RESPIRATION RATE: 16 BRPM | SYSTOLIC BLOOD PRESSURE: 117 MMHG | TEMPERATURE: 98 F | BODY MASS INDEX: 23.52 KG/M2 | DIASTOLIC BLOOD PRESSURE: 80 MMHG | HEART RATE: 70 BPM | WEIGHT: 119.81 LBS | OXYGEN SATURATION: 98 % | HEIGHT: 60 IN

## 2024-10-08 DIAGNOSIS — R79.89 ABNORMAL LFTS: ICD-10-CM

## 2024-10-08 DIAGNOSIS — G89.3 NEOPLASM RELATED PAIN (ACUTE) (CHRONIC): ICD-10-CM

## 2024-10-08 DIAGNOSIS — D50.0 IRON DEFICIENCY ANEMIA DUE TO CHRONIC BLOOD LOSS: ICD-10-CM

## 2024-10-08 DIAGNOSIS — R93.2 ABNORMAL FINDINGS ON DIAGNOSTIC IMAGING OF GALLBLADDER: ICD-10-CM

## 2024-10-08 DIAGNOSIS — C7B.8 METASTATIC MALIGNANT NEUROENDOCRINE TUMOR TO LIVER (HCC): Primary | ICD-10-CM

## 2024-10-08 LAB
ALBUMIN SERPL-MCNC: 4.4 G/DL (ref 3.2–4.8)
ALP LIVER SERPL-CCNC: 302 U/L
ALT SERPL-CCNC: 47 U/L
AST SERPL-CCNC: 62 U/L (ref ?–34)
BILIRUB DIRECT SERPL-MCNC: 0.5 MG/DL (ref ?–0.3)
BILIRUB SERPL-MCNC: 1 MG/DL (ref 0.3–1.2)
DEPRECATED HBV CORE AB SER IA-ACNC: 303 NG/ML
ERYTHROCYTE [DISTWIDTH] IN BLOOD BY AUTOMATED COUNT: 14.9 %
HCT VFR BLD AUTO: 34.9 %
HGB BLD-MCNC: 11.7 G/DL
INR BLD: 1.04 (ref 0.8–1.2)
IRON SATN MFR SERPL: 17 %
IRON SERPL-MCNC: 43 UG/DL
MCH RBC QN AUTO: 28.8 PG (ref 26–34)
MCHC RBC AUTO-ENTMCNC: 33.5 G/DL (ref 31–37)
MCV RBC AUTO: 86 FL
PLATELET # BLD AUTO: 138 10(3)UL (ref 150–450)
PROT SERPL-MCNC: 9.2 G/DL (ref 5.7–8.2)
PROTHROMBIN TIME: 13.7 SECONDS (ref 11.6–14.8)
RBC # BLD AUTO: 4.06 X10(6)UL
TOTAL IRON BINDING CAPACITY: 251 UG/DL (ref 250–425)
TRANSFERRIN SERPL-MCNC: 189 MG/DL (ref 250–380)
WBC # BLD AUTO: 4.7 X10(3) UL (ref 4–11)

## 2024-10-08 PROCEDURE — 99215 OFFICE O/P EST HI 40 MIN: CPT | Performed by: INTERNAL MEDICINE

## 2024-10-08 RX ORDER — HYDROCODONE BITARTRATE AND ACETAMINOPHEN 5; 325 MG/1; MG/1
1 TABLET ORAL
Qty: 90 TABLET | Refills: 0 | Status: SHIPPED | OUTPATIENT
Start: 2024-10-08

## 2024-10-08 NOTE — PROGRESS NOTES
Education Record    Learner:  Patient    Disease / Diagnosis: Neuroendocrine tumor of pancreas    Barriers / Limitations:  None   Comments:    Method:  Discussion   Comments:    General Topics:  Medication, Side effects and symptom management, and Plan of care reviewed   Comments:    Outcome:  Shows understanding   Comments:    Here for follow up. Getting a liver biopsy tomorrow. She is having more consistent pain in her back. Wondering what she can take for pain.

## 2024-10-08 NOTE — PROGRESS NOTES
Hematology/Oncology Clinic Follow Up Visit    Patient Name: Lluvia Nieves  Medical Record Number: [id number]    YOB: 1980   PCP: PHYSICIAN NONSTAFF    Reason for Consultation:  Lluvia Nieves was seen today for the diagnosis of metastatic well differentiated grade 2 pancreatic neuroendocrine tumor     Oncologic History:  12/2023: saw PCP for routine lab work, elevated LFTs with T bili 2.6  12/29/23: US liver with 4.6cm hypoechoic stricture in region of pancreatic head, intrahepatic and extrahepatic biliary ductal dilation  1/6/24: CT A/P with 3.3x2.8x3.7cm hypoenhancing mass in pancreatic head, enlarged peripancreatic lymph nodes, 1.8x1.8cm hypoenhancing mass in R hepatic lobe  1/10/24: ERCP with EUS showed 3.3cm hyopechoic mass at head of pancreas s/p sphincterotomy and metal bile duct stent placement. Path of pancreatic head mass and periportal lymph node both consistent with well differentiated, grade 2 neuroendocrine tumor, Ki-67 3%  1/31/24: Dotatate PET with avid pancreatic head mass, diffuse lymph node metastases of left supraclavicular region, mediastinum, hilum, retroperitoneum and mesentery  2/14/24: C1 lanreotide  3/13/24: C2 lanreotide  4/12/24: C3 lanreotide  4/23/24: s/p liver biopsy with Dr Magdaleno, path positive for primary biliary cholangitis  5/9/24: C4 lanreotide  5/17/24: CT enterography with disease progression in pancreas, abdominal/retroperitoneal adenopathy  6/21/24: C1 Lutathera  8/16/24: C2 Lutathera    History of Present Illness:      43 y/o F PMH 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    - her abdominal (umbilical region) discomfort is improved. However her RUQ region is now having constant pain. She bought advil but she did not start taking it yet. I told her to not take it.  -next Lutathera 10/11  - liver biopsy of her infiltrative liver mass is tomorrow    Past Medical History:  Past  Medical History:    Abdominal pain    Blood disorder    anemia    Blood in the stool    Cancer (HCC)    pancreas    Constipation    Disorder of liver    elevated enzymes    Easy bruising    Exposure to medical diagnostic radiation    last tx 8/16/2024    Flatulence/gas pain/belching    Headache disorder    Heartburn    Heavy menses    Hemorrhoids    Indigestion    Irregular bowel habits    Itch of skin    Leg swelling    Menses painful    Nausea    Stool incontinence    Stress    Uncomfortable fullness after meals    Visual impairment    reading glasses    Vomiting    Wears glasses    Weight loss     Past Surgical History:   Procedure Laterality Date    Needle biopsy left      Tubal ligation         Home Medications:   HYDROcodone-acetaminophen 5-325 MG Oral Tab Take 1 tablet by mouth every 4 to 6 hours as needed for Pain. 90 tablet 0    lanreotide 120 MG/0.5ML Subcutaneous Solution Inject 120 mg into the skin every 28 days.      Vitamin C 500 MG Oral Tab Take 1 tablet (500 mg total) by mouth daily.      ferrous sulfate 325 (65 FE) MG Oral Tab EC Take 1 tablet (325 mg total) by mouth daily with breakfast.      Multiple Vitamins-Minerals (MULTI-VITAMIN/MINERALS) Oral Tab Take 1 tablet by mouth daily.      Ursodiol 500 MG Oral Tab Take 1 tablet (500 mg total) by mouth in the morning and 1 tablet (500 mg total) before bedtime. 180 tablet 3       Allergies:   No Known Allergies    Psychosocial History:  Social History     Socioeconomic History    Marital status:      Spouse name: Not on file    Number of children: Not on file    Years of education: Not on file    Highest education level: Not on file   Occupational History    Not on file   Tobacco Use    Smoking status: Never    Smokeless tobacco: Never   Vaping Use    Vaping status: Never Used   Substance and Sexual Activity    Alcohol use: Not Currently    Drug use: Never    Sexual activity: Not on file   Other Topics Concern    Not on file   Social History  Narrative    Not on file     Social Determinants of Health     Financial Resource Strain: Not on file   Food Insecurity: Food Insecurity Present (2/9/2024)    Received from [facility], [facility]    Food Insecurity     Currently or in the past 3 months, have you worried your food would run out before you had money to buy more?: Yes     In the past 12 months, have you run out of food or been unable to get more?: No   Transportation Needs: No Transportation Needs (2/9/2024)    Received from [facility], [facility]    Transportation Needs     Currently or in the past 3 months, has lack of transportation kept you from medical appointments, getting food or medicine, or providing care to a family member?: Not on file     : Not on file     Medical Transportation Needs?: No     Daily Living Transportation Needs? [Peds Only] : Not on file   Physical Activity: Not on file   Stress: Not on file   Social Connections: Unknown (3/13/2021)    Received from [facility], [facility]    Social Connections     Conversations with friends/family/neighbors per week: Not on file   Housing Stability: Low Risk  (11/10/2023)    Received from [facility], [facility]    Housing Stability     Mortgage Payment Concerns?: Not on file     Number of Places Lived in the Last Year: Not on file     Unstable Housing?: Not on file       Family Medical History:  No family history on file.    Review of Systems:  A 10-point ROS was done with pertinent positives and negative per the HPI    Vital Signs:  Height: 152.4 cm (5') (10/08 0933)  Weight: 54.3 kg (119 lb 12.8 oz) (10/08 0933)  BSA (Calculated - sq m): 1.5 sq meters (10/08 0933)  Pulse: 70 (10/08 0933)  BP: 117/80 (10/08 0933)  Temp: 97.6 °F (36.4 °C) (10/08 0933)  Do Not Use - Resp Rate: --  SpO2: 98 % (10/08 0933)    Wt Readings from Last 6  Encounters:   10/08/24 54.3 kg (119 lb 12.8 oz)   09/17/24 59 kg (130 lb)   09/12/24 58.4 kg (128 lb 12.8 oz)   09/04/24 57.6 kg (127 lb)   08/15/24 57.7 kg (127 lb 3.2 oz)   07/18/24 61.1 kg (134 lb 12.8 oz)     Physical Examination:  General: Patient is alert and oriented, not in acute distress  Psych: Mood and affect are appropriate  Eyes: EOMI, PERRL  ENT: Oropharynx is clear, no adenopathy  CV: nl S1/S2, no LE edema  Respiratory: CTAB non-labored respirations  GI/Abd: Soft, non-tender. Pain on palpation of RUQ  Neurological: Grossly intact   Lymphatics: No palpable inguinal lymphadenopathy  Skin: no rashes or petechiae    Laboratory:  Lab Results   Component Value Date    WBC 4.7 10/08/2024    WBC 6.2 09/24/2024    WBC 5.6 09/12/2024    HGB 11.7 (L) 10/08/2024    HGB 9.9 (L) 09/24/2024    HGB 9.8 (L) 09/12/2024    HCT 34.9 (L) 10/08/2024    MCV 86.0 10/08/2024    MCH 28.8 10/08/2024    MCHC 33.5 10/08/2024    RDW 14.9 10/08/2024    .0 (L) 10/08/2024    .0 09/24/2024    .0 (L) 09/12/2024     Lab Results   Component Value Date     (H) 09/24/2024    BUN 10 09/24/2024    CREATSERUM 0.49 (L) 09/24/2024    CREATSERUM 0.65 09/12/2024    CREATSERUM 0.57 08/15/2024    ANIONGAP 8 09/24/2024    CA 9.3 09/24/2024    OSMOCALC 287 09/24/2024    ALKPHO 324 (H) 09/24/2024    AST 24 09/24/2024    ALT 22 09/24/2024    BILT 0.8 09/24/2024    TP 8.0 09/24/2024    ALB 3.7 09/24/2024    GLOBULIN 4.3 (H) 09/24/2024     09/24/2024    K 3.8 09/24/2024     09/24/2024    CO2 26.0 09/24/2024     Lab Results   Component Value Date    INR 1.04 10/08/2024         Impression & Plan:     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 Unclear etiology    Cancer associated pain  - 5-325mg q4-6h PRN norco  - discussed miralax for bowel prophylaxis    Iron deficiency anemia  - s/p IV iron dextran 2/14/24, IV infed 6/18/24    Primary biliary cholangitis  - on ursodiol; following with Dr Magdaleno    F/u: 1 month    Eduar Alfred  Hematology/Medical Oncology  [facility]

## 2024-10-09 ENCOUNTER — NURSE ONLY (OUTPATIENT)
Dept: LAB | Facility: HOSPITAL | Age: 44
End: 2024-10-09
Attending: INTERNAL MEDICINE
Payer: COMMERCIAL

## 2024-10-09 ENCOUNTER — HOSPITAL ENCOUNTER (OUTPATIENT)
Dept: CT IMAGING | Facility: HOSPITAL | Age: 44
Discharge: HOME | End: 2024-10-09
Attending: INTERNAL MEDICINE
Payer: COMMERCIAL

## 2024-10-09 VITALS
HEART RATE: 66 BPM | OXYGEN SATURATION: 98 % | HEIGHT: 60 IN | WEIGHT: 130 LBS | DIASTOLIC BLOOD PRESSURE: 64 MMHG | SYSTOLIC BLOOD PRESSURE: 104 MMHG | RESPIRATION RATE: 16 BRPM | BODY MASS INDEX: 25.52 KG/M2 | TEMPERATURE: 97 F

## 2024-10-09 DIAGNOSIS — C7B.8 METASTATIC MALIGNANT NEUROENDOCRINE TUMOR TO LIVER (HCC): Primary | ICD-10-CM

## 2024-10-09 DIAGNOSIS — R16.0 LIVER MASS: ICD-10-CM

## 2024-10-09 LAB — HCG UR QL: NEGATIVE

## 2024-10-09 PROCEDURE — 77012 CT SCAN FOR NEEDLE BIOPSY: CPT | Performed by: INTERNAL MEDICINE

## 2024-10-09 PROCEDURE — 88313 SPECIAL STAINS GROUP 2: CPT | Performed by: INTERNAL MEDICINE

## 2024-10-09 PROCEDURE — 99152 MOD SED SAME PHYS/QHP 5/>YRS: CPT | Performed by: INTERNAL MEDICINE

## 2024-10-09 PROCEDURE — 88307 TISSUE EXAM BY PATHOLOGIST: CPT | Performed by: INTERNAL MEDICINE

## 2024-10-09 PROCEDURE — 81025 URINE PREGNANCY TEST: CPT | Performed by: RADIOLOGY

## 2024-10-09 PROCEDURE — 47000 NEEDLE BIOPSY OF LIVER PERQ: CPT | Performed by: INTERNAL MEDICINE

## 2024-10-09 RX ORDER — HYDROCODONE BITARTRATE AND ACETAMINOPHEN 5; 325 MG/1; MG/1
1 TABLET ORAL EVERY 4 HOURS PRN
Status: DISCONTINUED | OUTPATIENT
Start: 2024-10-09 | End: 2024-10-11

## 2024-10-09 RX ORDER — NALOXONE HYDROCHLORIDE 0.4 MG/ML
INJECTION, SOLUTION INTRAMUSCULAR; INTRAVENOUS; SUBCUTANEOUS
Status: DISCONTINUED
Start: 2024-10-09 | End: 2024-10-09 | Stop reason: WASHOUT

## 2024-10-09 RX ORDER — MIDAZOLAM HYDROCHLORIDE 1 MG/ML
INJECTION INTRAMUSCULAR; INTRAVENOUS
Status: COMPLETED
Start: 2024-10-09 | End: 2024-10-09

## 2024-10-09 RX ORDER — FLUMAZENIL 0.1 MG/ML
INJECTION INTRAVENOUS
Status: DISCONTINUED
Start: 2024-10-09 | End: 2024-10-09 | Stop reason: WASHOUT

## 2024-10-09 RX ORDER — HYDROCODONE BITARTRATE AND ACETAMINOPHEN 5; 325 MG/1; MG/1
2 TABLET ORAL EVERY 4 HOURS PRN
Status: DISCONTINUED | OUTPATIENT
Start: 2024-10-09 | End: 2024-10-11

## 2024-10-09 RX ORDER — FLUMAZENIL 0.1 MG/ML
0.2 INJECTION INTRAVENOUS AS NEEDED
Status: DISCONTINUED | OUTPATIENT
Start: 2024-10-09 | End: 2024-10-11

## 2024-10-09 RX ORDER — NALOXONE HYDROCHLORIDE 0.4 MG/ML
0.08 INJECTION, SOLUTION INTRAMUSCULAR; INTRAVENOUS; SUBCUTANEOUS AS NEEDED
Status: DISCONTINUED | OUTPATIENT
Start: 2024-10-09 | End: 2024-10-11

## 2024-10-09 RX ORDER — MIDAZOLAM HYDROCHLORIDE 1 MG/ML
1 INJECTION INTRAMUSCULAR; INTRAVENOUS EVERY 5 MIN PRN
Status: ACTIVE | OUTPATIENT
Start: 2024-10-09 | End: 2024-10-09

## 2024-10-09 RX ORDER — SODIUM CHLORIDE 9 MG/ML
INJECTION, SOLUTION INTRAVENOUS CONTINUOUS
Status: DISCONTINUED | OUTPATIENT
Start: 2024-10-09 | End: 2024-10-11

## 2024-10-09 RX ORDER — ACETAMINOPHEN 325 MG/1
650 TABLET ORAL EVERY 4 HOURS PRN
Status: DISCONTINUED | OUTPATIENT
Start: 2024-10-09 | End: 2024-10-11

## 2024-10-09 RX ADMIN — MIDAZOLAM HYDROCHLORIDE 1 MG: 1 INJECTION INTRAMUSCULAR; INTRAVENOUS at 09:08:00

## 2024-10-09 RX ADMIN — SODIUM CHLORIDE: 9 INJECTION, SOLUTION INTRAVENOUS at 08:44:00

## 2024-10-09 RX ADMIN — MIDAZOLAM HYDROCHLORIDE 0.5 MG: 1 INJECTION INTRAMUSCULAR; INTRAVENOUS at 09:14:00

## 2024-10-09 RX ADMIN — HYDROCODONE BITARTRATE AND ACETAMINOPHEN 1 TABLET: 5; 325 TABLET ORAL at 11:14:00

## 2024-10-09 NOTE — INTERVAL H&P NOTE
The above referenced H&P was reviewed by Ran Corcoran MD on 10/9/2024, the patient was examined and no significant changes have occurred in the patient's condition since the H&P was performed.  Risks, benefits, alternative treatments and consequences of no treatment were discussed.  We will proceed with procedure as planned.      Ran Corcoran MD  10/9/2024  8:54 AM

## 2024-10-09 NOTE — IMAGING NOTE
Received pt to [facility].  Pt verified name and  as well as allergies.  Pt states NPO since  last night.  Pt states she does not take blood thinners.  Lab results of PT/INR and PLT reviewed.  Informed consent obtained by Dr. Corcoran.  Pt positioned supine on scanner.  CRM and pulse ox monitoring applied, alarms enabled. Sterile prep and 1% lido to area by Dr. Corcoran.  Specimens obtained.  Neosporin/bandaid dressing applied to site. CDI.  Pt positioned supine on gurney.  Pt awake and alert, conversing appropriately and in no apparent distress.   Pt transported to room 2258 with belongings. Pt accompanied by this RN, transporter, and son.  SBAR given to sandoval Gonzalez RN.

## 2024-10-09 NOTE — PROCEDURES
[facility]   part of [facility]  Procedure Note    Lluvia Nieves Patient Status:  Outpatient    1980 MRN [id number]   Location [facility] CT Attending Eduar Alfred MD    Day # 0 PCP PHYSICIAN NONSTAFF     Procedure: CT guided liver biopsy    Pre-Procedure Diagnosis:  liver mass    Post-Procedure Diagnosis: same    Anesthesia:  Local and Sedation    Findings:  solid cores    Specimens: 6 18 g cores    Blood Loss:  <10 ml    Tourniquet Time: n/a  Complications:  None  Drains:  none    Secondary Diagnosis:  none    Ran Corcoran MD  10/9/2024

## 2024-10-09 NOTE — PRE-SEDATION ASSESSMENT
[facility]   part of [facility] Pre-Procedure Sedation Assessment    History of snoring or sleep or apnea?   No    History of previous problems with anesthesia or sedation  No    Physical Findings:  Neck: nl ROM  CV: nl  PULM: normal respiratory rate/effort    Mallampati Score:  II (hard and soft palate, upper portion of tonsils anduvula visible)    ASA Classification:   2. Patient with mild systemic disease    Plan:   -IV moderate sedation    Ran Corcoran MD

## 2024-10-09 NOTE — DISCHARGE INSTRUCTIONS
Discharge/After Visit [facility] Department of Radiology  Biopsy - Liver    Post Sedation  Follow these guidelines:  You should be watched overnight by a responsible adult. This person should make sure your condition remains stable.   Do not drink any alcohol for 24 hours after the procedure.  Don’t drive, operate dangerous machinery, make important business or personal decisions, or sign legal documents for 24 hours after the procedure.  Note: Your healthcare provider may tell you not to take any medicine by mouth for pain or sleep for a period of time. These medicines may react with the medicine you were given during your procedure. This could cause a much stronger response than usual.     Activity:  Take it easy for the rest of the day after your biopsy.   You may be sore at the site of the biopsy for the next 5 days.   Do not do any strenuous exercises or lift over five pounds for the next 24 hours.     Biopsy Site:  Keep a bandage on the biopsy site for 24 hours after the procedure.   You may shower after 24 hours, but no soaking in a bathtub for 48 hours.       Liver Biopsy: If there is any increase in right-sided back, shoulder, or abdominal pain, go to the nearest Emergency Room. Call your doctor for unusual dizziness or weakness.       Diet: Drink plenty of fluids and resume your usual home diet. A slow return to normal foods minimizes nausea.    Pain Management:   You may use over-the-counter or prescribed pain relief medication if it is not contraindicated by another condition.     Medications:  You may resume your usual home medications. You may resume blood thinners 24 hours after the procedure if there is no bleeding from/hematoma at the puncture site or bloody urine (Renal Biopsy only)     When to seek medical advice:  Call the provider that ordered the biopsy with any questions and to discuss test results. These may also be available in your [facility] My Chart. You may also  contact the Radiology Nurse at [phone], M-F, 8-5 with any additional questions or concerns.  Dial [phone] and ask the  to page the on-call IR Radiologist if any of these occur:    A change in color or temperature of the area where the biopsy was performed.  You develop increasing pain or shortness of breath.  Unusual drowsiness, weakness, or dizziness.  Unusual vomiting.     IF YOU FEEL YOU ARE EXPERIENCING AN EMERGENCY,   CALL 911 OR GO TO THE NEAREST EMERGENCY ROOM      4.2.24 MO/  Radiology

## 2024-10-11 ENCOUNTER — NURSE ONLY (OUTPATIENT)
Dept: HEMATOLOGY/ONCOLOGY | Facility: HOSPITAL | Age: 44
End: 2024-10-11
Attending: INTERNAL MEDICINE
Payer: COMMERCIAL

## 2024-10-11 ENCOUNTER — HOSPITAL ENCOUNTER (OUTPATIENT)
Dept: NUCLEAR MEDICINE | Facility: HOSPITAL | Age: 44
Discharge: HOME | End: 2024-10-11
Attending: PHYSICIAN ASSISTANT
Payer: COMMERCIAL

## 2024-10-11 VITALS
RESPIRATION RATE: 16 BRPM | SYSTOLIC BLOOD PRESSURE: 112 MMHG | TEMPERATURE: 98 F | HEART RATE: 70 BPM | DIASTOLIC BLOOD PRESSURE: 72 MMHG | OXYGEN SATURATION: 99 %

## 2024-10-11 DIAGNOSIS — C7B.8 METASTATIC MALIGNANT NEUROENDOCRINE TUMOR TO LIVER (HCC): Primary | ICD-10-CM

## 2024-10-11 DIAGNOSIS — C7A.8 PRIMARY MALIGNANT NEUROENDOCRINE TUMOR OF PANCREAS (HCC): ICD-10-CM

## 2024-10-11 DIAGNOSIS — C7A.8 NEUROENDOCRINE CARCINOMA METASTATIC TO INTRA-ABDOMINAL LYMPH NODE (HCC): ICD-10-CM

## 2024-10-11 DIAGNOSIS — C7A.8 NEUROENDOCRINE CANCER (HCC): ICD-10-CM

## 2024-10-11 DIAGNOSIS — C7B.8 NEUROENDOCRINE CARCINOMA METASTATIC TO INTRA-ABDOMINAL LYMPH NODE (HCC): ICD-10-CM

## 2024-10-11 PROCEDURE — 96375 TX/PRO/DX INJ NEW DRUG ADDON: CPT

## 2024-10-11 PROCEDURE — 96366 THER/PROPH/DIAG IV INF ADDON: CPT

## 2024-10-11 PROCEDURE — 96365 THER/PROPH/DIAG IV INF INIT: CPT

## 2024-10-11 PROCEDURE — 79101 NUCLEAR RX IV ADMIN: CPT | Performed by: PHYSICIAN ASSISTANT

## 2024-10-11 PROCEDURE — 96372 THER/PROPH/DIAG INJ SC/IM: CPT

## 2024-10-11 RX ORDER — PROCHLORPERAZINE MALEATE 10 MG
10 TABLET ORAL ONCE
OUTPATIENT
Start: 2024-10-25

## 2024-10-11 RX ORDER — DEXAMETHASONE SODIUM PHOSPHATE 10 MG/ML
10 INJECTION, SOLUTION INTRAMUSCULAR; INTRAVENOUS ONCE
OUTPATIENT
Start: 2024-10-25

## 2024-10-11 RX ORDER — ONDANSETRON 2 MG/ML
INJECTION INTRAMUSCULAR; INTRAVENOUS
Status: COMPLETED
Start: 2024-10-11 | End: 2024-10-11

## 2024-10-11 RX ORDER — ONDANSETRON 2 MG/ML
8 INJECTION INTRAMUSCULAR; INTRAVENOUS ONCE
OUTPATIENT
Start: 2024-10-25

## 2024-10-11 RX ORDER — PROCHLORPERAZINE MALEATE 10 MG
TABLET ORAL
Status: COMPLETED
Start: 2024-10-11 | End: 2024-10-11

## 2024-10-11 RX ORDER — ARGININE/LYSINE/STERILE WATER 25-25MG/ML
1000 PLASTIC BAG, INJECTION (ML) INTRAVENOUS ONCE
Status: COMPLETED | OUTPATIENT
Start: 2024-10-11 | End: 2024-10-11

## 2024-10-11 RX ORDER — OCTREOTIDE ACETATE 500 UG/ML
300 INJECTION, SOLUTION INTRAVENOUS; SUBCUTANEOUS ONCE
OUTPATIENT
Start: 2024-10-25

## 2024-10-11 RX ORDER — ARGININE/LYSINE/STERILE WATER 25-25MG/ML
1000 PLASTIC BAG, INJECTION (ML) INTRAVENOUS ONCE
OUTPATIENT
Start: 2024-10-25

## 2024-10-11 RX ORDER — PROCHLORPERAZINE MALEATE 10 MG
10 TABLET ORAL ONCE
Status: COMPLETED | OUTPATIENT
Start: 2024-10-11 | End: 2024-10-11

## 2024-10-11 RX ORDER — ONDANSETRON 2 MG/ML
8 INJECTION INTRAMUSCULAR; INTRAVENOUS ONCE
Status: COMPLETED | OUTPATIENT
Start: 2024-10-11 | End: 2024-10-11

## 2024-10-11 RX ADMIN — ARGININE/LYSINE/STERILE WATER 1000 ML: 25-25MG/ML PLASTIC BAG, INJECTION (ML) INTRAVENOUS at 08:07:00

## 2024-10-11 RX ADMIN — PROCHLORPERAZINE MALEATE 10 MG: 10 MG TABLET ORAL at 12:59:00

## 2024-10-11 RX ADMIN — ONDANSETRON 8 MG: 2 INJECTION INTRAMUSCULAR; INTRAVENOUS at 07:40:00

## 2024-10-11 RX ADMIN — ARGININE/LYSINE/STERILE WATER: 25-25MG/ML PLASTIC BAG, INJECTION (ML) INTRAVENOUS at 09:09:00

## 2024-10-11 NOTE — PROGRESS NOTES
Pt to infusion center for Lutathera treatment.  Antiemetic administered and pt transported to [facility] by staff member via wheelchair.     Amino acid infusion started 30 minutes prior to Lutathera and continued for three hours after its completion.      Pt encouraged to hydrate and urinate frequently.  Amino acid infusion tolerated w/o adverse reaction.  Pt has a hx of carsickness on the way home, PO compazine given prior to discharge.    Sandostatin LAR injection given four hours post Lutathera.  Discharged home via wheelchair.    Education Record    Learner:  Patient  Barriers / Limitations:  None  Method:  Discussion and Reinforcement  General Topics:  Procedure, radiation safety, discharge instructions   Comments: Education provided by [facility] physician  Outcome:  Shows understanding

## 2024-11-07 ENCOUNTER — OFFICE VISIT (OUTPATIENT)
Dept: HEMATOLOGY/ONCOLOGY | Facility: HOSPITAL | Age: 44
End: 2024-11-07
Attending: INTERNAL MEDICINE
Payer: COMMERCIAL

## 2024-11-07 VITALS
WEIGHT: 123.19 LBS | TEMPERATURE: 99 F | HEIGHT: 60 IN | OXYGEN SATURATION: 97 % | BODY MASS INDEX: 24.19 KG/M2 | HEART RATE: 72 BPM | SYSTOLIC BLOOD PRESSURE: 108 MMHG | RESPIRATION RATE: 16 BRPM | DIASTOLIC BLOOD PRESSURE: 70 MMHG

## 2024-11-07 DIAGNOSIS — C7A.8 PRIMARY MALIGNANT NEUROENDOCRINE TUMOR OF PANCREAS (HCC): Primary | ICD-10-CM

## 2024-11-07 DIAGNOSIS — D69.6 THROMBOCYTOPENIA (HCC): ICD-10-CM

## 2024-11-07 LAB
ALBUMIN SERPL-MCNC: 4.2 G/DL (ref 3.2–4.8)
ALBUMIN/GLOB SERPL: 1.2 {RATIO} (ref 1–2)
ALP LIVER SERPL-CCNC: 314 U/L
ALT SERPL-CCNC: 41 U/L
ANION GAP SERPL CALC-SCNC: 5 MMOL/L (ref 0–18)
AST SERPL-CCNC: 54 U/L (ref ?–34)
BASOPHILS # BLD AUTO: 0.03 X10(3) UL (ref 0–0.2)
BASOPHILS NFR BLD AUTO: 0.6 %
BILIRUB SERPL-MCNC: 0.5 MG/DL (ref 0.3–1.2)
BUN BLD-MCNC: 9 MG/DL (ref 9–23)
CALCIUM BLD-MCNC: 9.2 MG/DL (ref 8.7–10.4)
CHLORIDE SERPL-SCNC: 105 MMOL/L (ref 98–112)
CO2 SERPL-SCNC: 28 MMOL/L (ref 21–32)
CREAT BLD-MCNC: 0.61 MG/DL
EGFRCR SERPLBLD CKD-EPI 2021: 112 ML/MIN/1.73M2 (ref 60–?)
EOSINOPHIL # BLD AUTO: 0.14 X10(3) UL (ref 0–0.7)
EOSINOPHIL NFR BLD AUTO: 3 %
ERYTHROCYTE [DISTWIDTH] IN BLOOD BY AUTOMATED COUNT: 15.5 %
FASTING STATUS PATIENT QL REPORTED: NO
GLOBULIN PLAS-MCNC: 3.4 G/DL (ref 2–3.5)
GLUCOSE BLD-MCNC: 141 MG/DL (ref 70–99)
HCT VFR BLD AUTO: 30.7 %
HGB BLD-MCNC: 10.2 G/DL
IMM GRANULOCYTES # BLD AUTO: 0.01 X10(3) UL (ref 0–1)
IMM GRANULOCYTES NFR BLD: 0.2 %
LYMPHOCYTES # BLD AUTO: 0.4 X10(3) UL (ref 1–4)
LYMPHOCYTES NFR BLD AUTO: 8.5 %
MCH RBC QN AUTO: 29.6 PG (ref 26–34)
MCHC RBC AUTO-ENTMCNC: 33.2 G/DL (ref 31–37)
MCV RBC AUTO: 89 FL
MONOCYTES # BLD AUTO: 0.37 X10(3) UL (ref 0.1–1)
MONOCYTES NFR BLD AUTO: 7.8 %
NEUTROPHILS # BLD AUTO: 3.78 X10 (3) UL (ref 1.5–7.7)
NEUTROPHILS # BLD AUTO: 3.78 X10(3) UL (ref 1.5–7.7)
NEUTROPHILS NFR BLD AUTO: 79.9 %
OSMOLALITY SERPL CALC.SUM OF ELEC: 287 MOSM/KG (ref 275–295)
PLATELET # BLD AUTO: 102 10(3)UL (ref 150–450)
POTASSIUM SERPL-SCNC: 4.1 MMOL/L (ref 3.5–5.1)
PROT SERPL-MCNC: 7.6 G/DL (ref 5.7–8.2)
RBC # BLD AUTO: 3.45 X10(6)UL
SODIUM SERPL-SCNC: 138 MMOL/L (ref 136–145)
WBC # BLD AUTO: 4.7 X10(3) UL (ref 4–11)

## 2024-11-07 PROCEDURE — 99215 OFFICE O/P EST HI 40 MIN: CPT | Performed by: INTERNAL MEDICINE

## 2024-11-07 RX ORDER — OCTREOTIDE ACETATE 500 UG/ML
300 INJECTION, SOLUTION INTRAVENOUS; SUBCUTANEOUS ONCE
OUTPATIENT
Start: 2024-11-21

## 2024-11-07 RX ORDER — OCTREOTIDE ACETATE 30 MG
30 KIT INTRAMUSCULAR ONCE
Status: CANCELLED | OUTPATIENT
Start: 2024-11-21 | End: 2024-11-21

## 2024-11-07 RX ORDER — DEXAMETHASONE SODIUM PHOSPHATE 10 MG/ML
10 INJECTION, SOLUTION INTRAMUSCULAR; INTRAVENOUS ONCE
OUTPATIENT
Start: 2024-11-21

## 2024-11-07 RX ORDER — OCTREOTIDE ACETATE 30 MG
30 KIT INTRAMUSCULAR ONCE
OUTPATIENT
Start: 2024-11-21

## 2024-11-07 RX ORDER — ONDANSETRON 2 MG/ML
8 INJECTION INTRAMUSCULAR; INTRAVENOUS ONCE
OUTPATIENT
Start: 2024-11-21

## 2024-11-07 RX ORDER — PROCHLORPERAZINE MALEATE 10 MG
10 TABLET ORAL ONCE
OUTPATIENT
Start: 2024-11-21

## 2024-11-07 RX ORDER — ARGININE/LYSINE/STERILE WATER 25-25MG/ML
1000 PLASTIC BAG, INJECTION (ML) INTRAVENOUS ONCE
OUTPATIENT
Start: 2024-11-21

## 2024-11-07 NOTE — PROGRESS NOTES
Education Record    Learner:  Patient    Disease / Diagnosis: Pancreatic Neuroendocrine tumor    Barriers / Limitations:  None   Comments:    Method:  Discussion   Comments:    General Topics:  Medication and Side effects and symptom management   Comments:    Outcome:  Shows understanding   Comments:    Here for follow up. Has completed 3 cycles of Lutathera. Having no side effects. Her pain is much better. She now only has occasional pain and does not need to take norco anymore. Feeling well.

## 2024-11-07 NOTE — PROGRESS NOTES
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 Path of pancreatic head mass and periportal lymph node both consistent with well differentiated, grade 2 neuroendocrine tumor, Ki-67 3%  1/31/24: Dotatate PET with avid pancreatic head mass, diffuse lymph node metastases of left supraclavicular region, mediastinum, hilum, retroperitoneum and mesentery  2/14/24: C1 lanreotide  3/13/24: C2 lanreotide  4/12/24: C3 lanreotide  4/23/24: s/p liver biopsy with Dr Magdaleno, path positive for primary biliary cholangitis  5/9/24: C4 lanreotide  5/17/24: CT enterography with disease progression in pancreas, abdominal/retroperitoneal adenopathy  6/21/24: C1 Lutathera  8/16/24: C2 Lutathera  10/11/24: C3 Lutathera    History of Present Illness:      43 y/o F PMH 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    - her pain has completely resolved  - she is feeling well  - stable appetite and weight    Past Medical History:  Past Medical History:    Abdominal pain    Blood disorder    anemia    Blood in the stool    Cancer (HCC)    pancreas    Constipation    Disorder of liver    elevated  enzymes    Easy bruising    Exposure to medical diagnostic radiation    last tx 8/16/2024    Flatulence/gas pain/belching    Headache disorder    Heartburn    Heavy menses    Hemorrhoids    Indigestion    Irregular bowel habits    Itch of skin    Leg swelling    Menses painful    Nausea    Stool incontinence    Stress    Uncomfortable fullness after meals    Visual impairment    reading glasses    Vomiting    Wears glasses    Weight loss     Past Surgical History:   Procedure Laterality Date    Needle biopsy left      Tubal ligation         Home Medications:   lanreotide 120 MG/0.5ML Subcutaneous Solution Inject 120 mg into the skin every 28 days.      Vitamin C 500 MG Oral Tab Take 1 tablet (500 mg total) by mouth daily.      Multiple Vitamins-Minerals (MULTI-VITAMIN/MINERALS) Oral Tab Take 1 tablet by mouth daily.      Ursodiol 500 MG Oral Tab Take 1 tablet (500 mg total) by mouth in the morning and 1 tablet (500 mg total) before bedtime. 180 tablet 3       Allergies:   No Known Allergies    Psychosocial History:  Social History     Socioeconomic History    Marital status:      Spouse name: Not on file    Number of children: Not on file    Years of education: Not on file    Highest education level: Not on file   Occupational History    Not on file   Tobacco Use    Smoking status: Never    Smokeless tobacco: Never   Vaping Use    Vaping status: Never Used   Substance and Sexual Activity    Alcohol use: Not Currently    Drug use: Never    Sexual activity: Not on file   Other Topics Concern    Not on file   Social History Narrative    Not on file     Social Drivers of Health     Financial Resource Strain: Not on file   Food Insecurity: Food Insecurity Present (2/9/2024)    Received [facility], [facility]    Food Insecurity     Currently or in the past 3 months, have you worried your food would run out before you had money to buy more?: Yes     In the past 12 months,  have you run out of food or been unable to get more?: No   Transportation Needs: No Transportation Needs (2/9/2024)    Received from [facility], [facility]    Transportation Needs     Currently or in the past 3 months, has lack of transportation kept you from medical appointments, getting food or medicine, or providing care to a family member?: Not on file     : Not on file     Medical Transportation Needs?: No     Daily Living Transportation Needs? [Peds Only] : Not on file   Physical Activity: Not on file   Stress: Not on file   Social Connections: Unknown (3/13/2021)    Received from [facility], [facility]    Social Connections     Conversations with friends/family/neighbors per week: Not on file   Housing Stability: Low Risk  (11/10/2023)    Received from [facility], [facility]    Housing Stability     Mortgage Payment Concerns?: Not on file     Number of Places Lived in the Last Year: Not on file     Unstable Housing?: Not on file       Family Medical History:  No family history on file.    Review of Systems:  A 10-point ROS was done with pertinent positives and negative per the HPI    Vital Signs:  Height: 152.4 cm (5') (11/07 0945)  Weight: 55.9 kg (123 lb 3.2 oz) (11/07 0945)  BSA (Calculated - sq m): 1.52 sq meters (11/07 0945)  Pulse: 72 (11/07 0945)  BP: 108/70 (11/07 0945)  Temp: 98.7 °F (37.1 °C) (11/07 0945)  Do Not Use - Resp Rate: --  SpO2: 97 % (11/07 0945)    Wt Readings from Last 6 Encounters:   11/07/24 55.9 kg (123 lb 3.2 oz)   09/30/24 59 kg (130 lb)   10/08/24 54.3 kg (119 lb 12.8 oz)   09/17/24 59 kg (130 lb)   09/12/24 58.4 kg (128 lb 12.8 oz)   09/04/24 57.6 kg (127 lb)     Physical Examination:  General: Patient is alert and oriented, not in acute distress  Psych: Mood and affect are appropriate  Eyes: EOMI, PERRL  ENT: Oropharynx is clear, no adenopathy  CV: no LE  edema  Respiratory: non-labored respirations  GI/Abd: Soft, non-tender. Pain on palpation of RUQ  Neurological: Grossly intact   Lymphatics: No palpable inguinal lymphadenopathy  Skin: no rashes or petechiae    Laboratory:  Lab Results   Component Value Date    WBC 4.7 11/07/2024    WBC 4.7 10/08/2024    WBC 6.2 09/24/2024    HGB 10.2 (L) 11/07/2024    HGB 11.7 (L) 10/08/2024    HGB 9.9 (L) 09/24/2024    HCT 30.7 (L) 11/07/2024    MCV 89.0 11/07/2024    MCH 29.6 11/07/2024    MCHC 33.2 11/07/2024    RDW 15.5 11/07/2024    .0 (L) 11/07/2024    .0 (L) 10/08/2024    .0 09/24/2024     Lab Results   Component Value Date     (H) 11/07/2024    BUN 9 11/07/2024    CREATSERUM 0.61 11/07/2024    CREATSERUM 0.49 (L) 09/24/2024    CREATSERUM 0.65 09/12/2024    ANIONGAP 5 11/07/2024    CA 9.2 11/07/2024    OSMOCALC 287 11/07/2024    ALKPHO 314 (H) 11/07/2024    AST 54 (H) 11/07/2024    ALT 41 11/07/2024    BILT 0.5 11/07/2024    TP 7.6 11/07/2024    ALB 4.2 11/07/2024    GLOBULIN 3.4 11/07/2024     11/07/2024    K 4.1 11/07/2024     11/07/2024    CO2 28.0 11/07/2024     Lab Results   Component Value Date    INR 1.04 10/08/2024         Impression & Plan:     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, grade 2  - metastatic to liver and diffuse lymphadenopathy (ki-67 3%)  - s/p metal biliary stent placement 1/10/24  - baseline chromogranin A 229  - previously discussed incurable prognosis and palliative intent of treatment  - progressed on 1L single agent lanreotide  - received C1 Lutathera 6/21/24, C2 8/16/24, C3 10/11/24  - labs ok, SAS tomorrow  - receiving SSA at [facility] due to insurance  - at next visit, order restaging dotatate PET to completed ~12/30     Infiltrative liver mass  - unclear etiology. Liver mass biopsy negative for malignancy.    Thrombocytopenia  - secondary to lutathera. Mild. Can monitor for now    Cancer associated pain  - resolved    Iron  deficiency anemia  - s/p IV iron dextran 2/14/24, IV infed 6/18/24    Primary biliary cholangitis  - on ursodiol; following with Dr Magdaleno    F/u: 1 month    Eduar Alfred  Hematology/Medical Oncology  [facility]

## 2024-11-08 ENCOUNTER — NURSE ONLY (OUTPATIENT)
Dept: HEMATOLOGY/ONCOLOGY | Facility: HOSPITAL | Age: 44
End: 2024-11-08
Attending: INTERNAL MEDICINE
Payer: COMMERCIAL

## 2024-11-08 DIAGNOSIS — C7B.8 NEUROENDOCRINE CARCINOMA METASTATIC TO INTRA-ABDOMINAL LYMPH NODE (HCC): ICD-10-CM

## 2024-11-08 DIAGNOSIS — C7A.8 NEUROENDOCRINE CARCINOMA METASTATIC TO INTRA-ABDOMINAL LYMPH NODE (HCC): ICD-10-CM

## 2024-11-08 DIAGNOSIS — C7B.8 METASTATIC MALIGNANT NEUROENDOCRINE TUMOR TO LIVER (HCC): Primary | ICD-10-CM

## 2024-11-08 DIAGNOSIS — C7A.8 PRIMARY MALIGNANT NEUROENDOCRINE TUMOR OF PANCREAS (HCC): ICD-10-CM

## 2024-11-08 PROCEDURE — 96372 THER/PROPH/DIAG INJ SC/IM: CPT

## 2024-11-08 RX ORDER — OCTREOTIDE ACETATE 30 MG
30 KIT INTRAMUSCULAR ONCE
Status: COMPLETED | OUTPATIENT
Start: 2024-11-08 | End: 2024-11-08

## 2024-11-08 RX ORDER — OCTREOTIDE ACETATE 30 MG
KIT INTRAMUSCULAR
Status: COMPLETED
Start: 2024-11-08 | End: 2024-11-08

## 2024-11-08 RX ORDER — DEXAMETHASONE SODIUM PHOSPHATE 10 MG/ML
10 INJECTION, SOLUTION INTRAMUSCULAR; INTRAVENOUS ONCE
OUTPATIENT
Start: 2024-11-22

## 2024-11-08 RX ORDER — ARGININE/LYSINE/STERILE WATER 25-25MG/ML
1000 PLASTIC BAG, INJECTION (ML) INTRAVENOUS ONCE
OUTPATIENT
Start: 2024-11-22

## 2024-11-08 RX ORDER — OCTREOTIDE ACETATE 500 UG/ML
300 INJECTION, SOLUTION INTRAVENOUS; SUBCUTANEOUS ONCE
OUTPATIENT
Start: 2024-11-22

## 2024-11-08 RX ORDER — OCTREOTIDE ACETATE 30 MG
30 KIT INTRAMUSCULAR ONCE
Status: CANCELLED | OUTPATIENT
Start: 2024-11-22 | End: 2024-11-22

## 2024-11-08 RX ORDER — ONDANSETRON 2 MG/ML
8 INJECTION INTRAMUSCULAR; INTRAVENOUS ONCE
OUTPATIENT
Start: 2024-11-22

## 2024-11-08 RX ORDER — PROCHLORPERAZINE MALEATE 10 MG
10 TABLET ORAL ONCE
OUTPATIENT
Start: 2024-11-22

## 2024-11-08 RX ORDER — OCTREOTIDE ACETATE 30 MG
30 KIT INTRAMUSCULAR ONCE
OUTPATIENT
Start: 2024-11-22

## 2024-11-08 RX ADMIN — OCTREOTIDE ACETATE 30 MG: 30 MG KIT INTRAMUSCULAR at 11:31:00

## 2024-11-11 LAB — CHROMOGRANIN A: 79.3 NG/ML

## 2024-11-20 ENCOUNTER — LAB ENCOUNTER (OUTPATIENT)
Dept: LAB | Age: 44
End: 2024-11-20
Attending: PHYSICIAN ASSISTANT
Payer: COMMERCIAL

## 2024-11-20 ENCOUNTER — TELEPHONE (OUTPATIENT)
Dept: HEMATOLOGY/ONCOLOGY | Facility: HOSPITAL | Age: 44
End: 2024-11-20

## 2024-11-20 DIAGNOSIS — R79.89 ABNORMAL LFTS: ICD-10-CM

## 2024-11-20 DIAGNOSIS — C7A.8 PRIMARY MALIGNANT NEUROENDOCRINE TUMOR OF PANCREAS (HCC): ICD-10-CM

## 2024-11-20 DIAGNOSIS — C7B.8 METASTATIC MALIGNANT NEUROENDOCRINE TUMOR TO LIVER (HCC): ICD-10-CM

## 2024-11-20 DIAGNOSIS — C7B.8 METASTATIC MALIGNANT NEUROENDOCRINE TUMOR TO LIVER (HCC): Primary | ICD-10-CM

## 2024-11-20 LAB
ALBUMIN SERPL-MCNC: 4.1 G/DL (ref 3.2–4.8)
ALBUMIN/GLOB SERPL: 0.9 {RATIO} (ref 1–2)
ALP LIVER SERPL-CCNC: 362 U/L
ALT SERPL-CCNC: 48 U/L
ANION GAP SERPL CALC-SCNC: 6 MMOL/L (ref 0–18)
AST SERPL-CCNC: 72 U/L (ref ?–34)
BASOPHILS # BLD AUTO: 0.02 X10(3) UL (ref 0–0.2)
BASOPHILS NFR BLD AUTO: 0.4 %
BILIRUB DIRECT SERPL-MCNC: 0.2 MG/DL (ref ?–0.3)
BILIRUB SERPL-MCNC: 0.5 MG/DL (ref 0.3–1.2)
BUN BLD-MCNC: 13 MG/DL (ref 9–23)
CALCIUM BLD-MCNC: 9.7 MG/DL (ref 8.7–10.4)
CHLORIDE SERPL-SCNC: 103 MMOL/L (ref 98–112)
CO2 SERPL-SCNC: 26 MMOL/L (ref 21–32)
CREAT BLD-MCNC: 0.56 MG/DL
EGFRCR SERPLBLD CKD-EPI 2021: 114 ML/MIN/1.73M2 (ref 60–?)
EOSINOPHIL # BLD AUTO: 0.13 X10(3) UL (ref 0–0.7)
EOSINOPHIL NFR BLD AUTO: 2.3 %
ERYTHROCYTE [DISTWIDTH] IN BLOOD BY AUTOMATED COUNT: 15.1 %
FASTING STATUS PATIENT QL REPORTED: YES
GLOBULIN PLAS-MCNC: 4.6 G/DL (ref 2–3.5)
GLUCOSE BLD-MCNC: 132 MG/DL (ref 70–99)
HCT VFR BLD AUTO: 33.3 %
HGB BLD-MCNC: 10.8 G/DL
IMM GRANULOCYTES # BLD AUTO: 0.03 X10(3) UL (ref 0–1)
IMM GRANULOCYTES NFR BLD: 0.5 %
LYMPHOCYTES # BLD AUTO: 0.42 X10(3) UL (ref 1–4)
LYMPHOCYTES NFR BLD AUTO: 7.5 %
MCH RBC QN AUTO: 29.4 PG (ref 26–34)
MCHC RBC AUTO-ENTMCNC: 32.4 G/DL (ref 31–37)
MCV RBC AUTO: 90.7 FL
MONOCYTES # BLD AUTO: 0.39 X10(3) UL (ref 0.1–1)
MONOCYTES NFR BLD AUTO: 7 %
NEUTROPHILS # BLD AUTO: 4.62 X10 (3) UL (ref 1.5–7.7)
NEUTROPHILS # BLD AUTO: 4.62 X10(3) UL (ref 1.5–7.7)
NEUTROPHILS NFR BLD AUTO: 82.3 %
OSMOLALITY SERPL CALC.SUM OF ELEC: 282 MOSM/KG (ref 275–295)
PLATELET # BLD AUTO: 123 10(3)UL (ref 150–450)
POTASSIUM SERPL-SCNC: 3.6 MMOL/L (ref 3.5–5.1)
PROT SERPL-MCNC: 8.7 G/DL (ref 5.7–8.2)
RBC # BLD AUTO: 3.67 X10(6)UL
SODIUM SERPL-SCNC: 135 MMOL/L (ref 136–145)
WBC # BLD AUTO: 5.6 X10(3) UL (ref 4–11)

## 2024-11-20 PROCEDURE — 36415 COLL VENOUS BLD VENIPUNCTURE: CPT

## 2024-11-20 PROCEDURE — 85025 COMPLETE CBC W/AUTO DIFF WBC: CPT

## 2024-11-20 PROCEDURE — 82248 BILIRUBIN DIRECT: CPT

## 2024-11-20 PROCEDURE — 80053 COMPREHEN METABOLIC PANEL: CPT

## 2024-11-25 ENCOUNTER — HOSPITAL ENCOUNTER (OUTPATIENT)
Age: 44
Discharge: HOME | End: 2024-11-25
Payer: COMMERCIAL

## 2024-11-25 VITALS
OXYGEN SATURATION: 99 % | DIASTOLIC BLOOD PRESSURE: 72 MMHG | RESPIRATION RATE: 16 BRPM | HEART RATE: 84 BPM | TEMPERATURE: 98 F | SYSTOLIC BLOOD PRESSURE: 114 MMHG

## 2024-11-25 DIAGNOSIS — S46.911A STRAIN OF RIGHT SHOULDER, INITIAL ENCOUNTER: Primary | ICD-10-CM

## 2024-11-25 PROCEDURE — 99203 OFFICE O/P NEW LOW 30 MIN: CPT | Performed by: NURSE PRACTITIONER

## 2024-11-25 RX ORDER — NAPROXEN 500 MG/1
500 TABLET ORAL 2 TIMES DAILY PRN
Qty: 14 TABLET | Refills: 0 | Status: SHIPPED | OUTPATIENT
Start: 2024-11-25 | End: 2024-12-02

## 2024-11-25 RX ORDER — ORPHENADRINE CITRATE 100 MG/1
100 TABLET ORAL 2 TIMES DAILY
Qty: 14 EACH | Refills: 0 | Status: SHIPPED | OUTPATIENT
Start: 2024-11-25 | End: 2024-12-02

## 2024-11-25 NOTE — ED INITIAL ASSESSMENT (HPI)
Patient reports pain in right shoulder after trying to fix fence last week. Patient stats she has no strength in her hand now, limited ROM  no direct trauma \"just using the hammer repeatedly\"

## 2024-11-25 NOTE — ED PROVIDER NOTES
Patient Seen in: [facility]      History     Chief Complaint   Patient presents with    Arm or Hand Injury     Stated Complaint: shoulder injury    Subjective:   HPI  44-year-old female presents to me care with complaints of a right shoulder pain.  Patient she was hammering a fence this past week and repetitively using the shoulder and now has pain to the right shoulder.  No direct injury or trauma.  No numbness or ting.  Is slight decreased range of motion due to the pain.  Patient has no other concerns this time.  The patient's medication list, past medical history and social history elements as listed in today's nurse's notes were reviewed and agreed (except as otherwise stated in the HPI).  The patient's family history reviewed and determined to be noncontributory to the presenting problem.      Objective:     Past Medical History:    Abdominal pain    Blood disorder    anemia    Blood in the stool    Cancer (HCC)    pancreas    Constipation    Disorder of liver    elevated enzymes    Easy bruising    Exposure to medical diagnostic radiation    last tx 8/16/2024    Flatulence/gas pain/belching    Headache disorder    Heartburn    Heavy menses    Hemorrhoids    Indigestion    Irregular bowel habits    Itch of skin    Leg swelling    Menses painful    Nausea    Stool incontinence    Stress    Uncomfortable fullness after meals    Visual impairment    reading glasses    Vomiting    Wears glasses    Weight loss              Past Surgical History:   Procedure Laterality Date    Needle biopsy left      Tubal ligation                  Social History     Socioeconomic History    Marital status:    Tobacco Use    Smoking status: Never    Smokeless tobacco: Never   Vaping Use    Vaping status: Never Used   Substance and Sexual Activity    Alcohol use: Not Currently    Drug use: Never     Social Drivers of Health     Food Insecurity: Food Insecurity Present (2/9/2024)    Received from [facility]  [facility], [facility]    Food Insecurity     Currently or in the past 3 months, have you worried your food would run out before you had money to buy more?: Yes     In the past 12 months, have you run out of food or been unable to get more?: No   Transportation Needs: No Transportation Needs (2/9/2024)    [facility] from [facility], [facility]    Transportation Needs     Medical Transportation Needs?: No    Received from [facility], [facility]    Social Connections    Received from [facility], [facility]    Housing Stability              Review of Systems    Positive for stated complaint: shoulder injury  Other systems are as noted in HPI.  Constitutional and vital signs reviewed.      All other systems reviewed and negative except as noted above.    Physical Exam     ED Triage Vitals [11/25/24 1102]   /72   Pulse 84   Resp 16   Temp 97.6 °F (36.4 °C)   Temp src Temporal   SpO2 99 %   O2 Device None (Room air)       Current Vitals:   Vital Signs  BP: 114/72  Pulse: 84  Resp: 16  Temp: 97.6 °F (36.4 °C)  Temp src: Temporal    Oxygen Therapy  SpO2: 99 %  O2 Device: None (Room air)        Physical Exam  Nursing note and vitals reviewed and noted.  General Appearance: Well-nourished, well developed in no acute distress  Orientation: Oriented to person, place and time.             Mood / Affect: Calm  Gait: normal           Coordination: normal  R Shoulder Exam: No bony deformities, inflammation, or tenderness in rotator cuff, biceps tendon, or acromioclavicular joint. Full ROM and strength in shoulder upon adduction, abduction, internal and external rotation.  Scapular winging, impingement sign, scratch test, joint laxity, drop arm test were negative.     Sensation: Subjective normal median / ulnar / radial / axillary sensation bilaterally  Vasculature: 2+ radial pulse  bilaterally.  C-Spine Tenderness: non-tender  Heart: No tachycardia  Pulmonary: No respiratory distress    ED Course   Labs Reviewed - No data to display              MDM   Patient presenting to the [facility] with isolated injury documented above.  Patient's pain has improved with medications and  has no signs of neurovascular compromise or compartment syndrome.  I adressed with the patient the possibly of more significant ligamentous/soft tissue injury which will not be definitely identified at this time may require further outpatient evaluation including possible MRI especially if the symptoms persist thus advised on R ICE, sling/Velcro splint/brace/knee immobilizer/crutches applied and will DC to follow-up with orthopedics as well as primary care provider for reevaluation and further imaging if indicated.  Prescription for analgesics given.  Please note that this report has been produced using speech recognition software and may contain errors related to that system including, but not limited to, errors in grammar, punctuation, and spelling, as well as words and phrases that possibly may have been recognized inappropriately.  If there are any questions or concerns, contact the dictating provider for clarification.          Medical Decision Making      Disposition and Plan     Clinical Impression:  1. Strain of right shoulder, initial encounter         Disposition:  Discharge  11/25/2024 11:19 am    Follow-up:  No follow-up provider specified.        Medications Prescribed:  Current Discharge Medication List        START taking these medications    Details   naproxen 500 MG Oral Tab Take 1 tablet (500 mg total) by mouth 2 (two) times daily as needed.  Qty: 14 tablet, Refills: 0      orphenadrine  MG Oral Tablet 12 Hr Take 100 mg by mouth 2 (two) times daily for 7 days.  Qty: 14 each, Refills: 0                 Supplementary Documentation:

## 2024-11-25 NOTE — DISCHARGE INSTRUCTIONS
Follow-up with your primary care physician in one week if symptoms have not improved or symptoms are starting to get worse.  Increase fluids, keep well-hydrated.  Take naproxen twice daily for 7 days  Use the Norflex twice a day for 7 days  Ice to the shoulder ice treatments on every couple hours  Return to the emergency room for symptoms or concerns

## 2024-12-03 ENCOUNTER — OFFICE VISIT (OUTPATIENT)
Dept: HEMATOLOGY/ONCOLOGY | Facility: HOSPITAL | Age: 44
End: 2024-12-03
Attending: INTERNAL MEDICINE
Payer: COMMERCIAL

## 2024-12-03 VITALS
HEART RATE: 97 BPM | WEIGHT: 117 LBS | TEMPERATURE: 98 F | OXYGEN SATURATION: 98 % | RESPIRATION RATE: 16 BRPM | SYSTOLIC BLOOD PRESSURE: 122 MMHG | HEIGHT: 60 IN | DIASTOLIC BLOOD PRESSURE: 79 MMHG | BODY MASS INDEX: 22.97 KG/M2

## 2024-12-03 DIAGNOSIS — C7A.8 NEUROENDOCRINE CARCINOMA METASTATIC TO INTRA-ABDOMINAL LYMPH NODE (HCC): ICD-10-CM

## 2024-12-03 DIAGNOSIS — C7B.8 METASTATIC MALIGNANT NEUROENDOCRINE TUMOR TO LIVER (HCC): ICD-10-CM

## 2024-12-03 DIAGNOSIS — C7A.8 PRIMARY MALIGNANT NEUROENDOCRINE TUMOR OF PANCREAS (HCC): Primary | ICD-10-CM

## 2024-12-03 DIAGNOSIS — C7B.8 NEUROENDOCRINE CARCINOMA METASTATIC TO INTRA-ABDOMINAL LYMPH NODE (HCC): ICD-10-CM

## 2024-12-03 LAB
ALBUMIN SERPL-MCNC: 4.1 G/DL (ref 3.2–4.8)
ALBUMIN/GLOB SERPL: 1.1 {RATIO} (ref 1–2)
ALP LIVER SERPL-CCNC: 370 U/L
ALT SERPL-CCNC: 36 U/L
ANION GAP SERPL CALC-SCNC: 11 MMOL/L (ref 0–18)
AST SERPL-CCNC: 106 U/L (ref ?–34)
BASOPHILS # BLD AUTO: 0.03 X10(3) UL (ref 0–0.2)
BASOPHILS NFR BLD AUTO: 0.6 %
BILIRUB SERPL-MCNC: 0.4 MG/DL (ref 0.3–1.2)
BUN BLD-MCNC: 16 MG/DL (ref 9–23)
CALCIUM BLD-MCNC: 9.7 MG/DL (ref 8.7–10.4)
CHLORIDE SERPL-SCNC: 100 MMOL/L (ref 98–112)
CO2 SERPL-SCNC: 24 MMOL/L (ref 21–32)
CREAT BLD-MCNC: 0.84 MG/DL
EGFRCR SERPLBLD CKD-EPI 2021: 87 ML/MIN/1.73M2 (ref 60–?)
EOSINOPHIL # BLD AUTO: 0.12 X10(3) UL (ref 0–0.7)
EOSINOPHIL NFR BLD AUTO: 2.2 %
ERYTHROCYTE [DISTWIDTH] IN BLOOD BY AUTOMATED COUNT: 14.2 %
FASTING STATUS PATIENT QL REPORTED: NO
GLOBULIN PLAS-MCNC: 3.8 G/DL (ref 2–3.5)
GLUCOSE BLD-MCNC: 211 MG/DL (ref 70–99)
HCG UR QL: NEGATIVE
HCT VFR BLD AUTO: 31.7 %
HGB BLD-MCNC: 10.9 G/DL
IMM GRANULOCYTES # BLD AUTO: 0.04 X10(3) UL (ref 0–1)
IMM GRANULOCYTES NFR BLD: 0.7 %
LYMPHOCYTES # BLD AUTO: 0.42 X10(3) UL (ref 1–4)
LYMPHOCYTES NFR BLD AUTO: 7.8 %
MCH RBC QN AUTO: 29.2 PG (ref 26–34)
MCHC RBC AUTO-ENTMCNC: 34.4 G/DL (ref 31–37)
MCV RBC AUTO: 85 FL
MONOCYTES # BLD AUTO: 0.49 X10(3) UL (ref 0.1–1)
MONOCYTES NFR BLD AUTO: 9.1 %
NEUTROPHILS # BLD AUTO: 4.29 X10 (3) UL (ref 1.5–7.7)
NEUTROPHILS # BLD AUTO: 4.29 X10(3) UL (ref 1.5–7.7)
NEUTROPHILS NFR BLD AUTO: 79.6 %
OSMOLALITY SERPL CALC.SUM OF ELEC: 287 MOSM/KG (ref 275–295)
PLATELET # BLD AUTO: 125 10(3)UL (ref 150–450)
POTASSIUM SERPL-SCNC: 4 MMOL/L (ref 3.5–5.1)
PROT SERPL-MCNC: 7.9 G/DL (ref 5.7–8.2)
RBC # BLD AUTO: 3.73 X10(6)UL
SODIUM SERPL-SCNC: 135 MMOL/L (ref 136–145)
WBC # BLD AUTO: 5.4 X10(3) UL (ref 4–11)

## 2024-12-03 PROCEDURE — 99215 OFFICE O/P EST HI 40 MIN: CPT | Performed by: INTERNAL MEDICINE

## 2024-12-03 RX ORDER — OCTREOTIDE ACETATE 30 MG
30 KIT INTRAMUSCULAR ONCE
Status: CANCELLED | OUTPATIENT
Start: 2024-12-17

## 2024-12-03 RX ORDER — ONDANSETRON 2 MG/ML
8 INJECTION INTRAMUSCULAR; INTRAVENOUS ONCE
Status: CANCELLED | OUTPATIENT
Start: 2024-12-17

## 2024-12-03 RX ORDER — ARGININE/LYSINE/STERILE WATER 25-25MG/ML
1000 PLASTIC BAG, INJECTION (ML) INTRAVENOUS ONCE
Status: CANCELLED | OUTPATIENT
Start: 2024-12-17

## 2024-12-03 RX ORDER — PROCHLORPERAZINE MALEATE 10 MG
10 TABLET ORAL ONCE
Status: CANCELLED | OUTPATIENT
Start: 2024-12-17

## 2024-12-03 RX ORDER — DEXAMETHASONE SODIUM PHOSPHATE 10 MG/ML
10 INJECTION, SOLUTION INTRAMUSCULAR; INTRAVENOUS ONCE
Status: CANCELLED | OUTPATIENT
Start: 2024-12-17

## 2024-12-03 RX ORDER — OCTREOTIDE ACETATE 500 UG/ML
300 INJECTION, SOLUTION INTRAVENOUS; SUBCUTANEOUS ONCE
OUTPATIENT
Start: 2024-12-17

## 2024-12-03 NOTE — PROGRESS NOTES
Education Record    Learner:  Patient    Disease / Diagnosis: Neuroendocrine CA    Barriers / Limitations:  None   Comments:    Method:  Discussion   Comments:    General Topics:  Medication, Side effects and symptom management, and Plan of care reviewed   Comments:    Outcome:  Shows understanding   Comments:    Here for follow up. Cycle 4 Lutathera is 12/6. Feeling well besides her Right shoulder which she injured using a hammer. She is taking norco PRN for pain.

## 2024-12-03 NOTE — PROGRESS NOTES
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 Path of pancreatic head mass and periportal lymph node both consistent with well differentiated, grade 2 neuroendocrine tumor, Ki-67 3%  1/31/24: Dotatate PET with avid pancreatic head mass, diffuse lymph node metastases of left supraclavicular region, mediastinum, hilum, retroperitoneum and mesentery  2/14/24: C1 lanreotide  3/13/24: C2 lanreotide  4/12/24: C3 lanreotide  4/23/24: s/p liver biopsy with Dr Magdalneo, path positive for primary biliary cholangitis  5/9/24: C4 lanreotide  5/17/24: CT enterography with disease progression in pancreas, abdominal/retroperitoneal adenopathy  6/21/24: C1 Lutathera  8/16/24: C2 Lutathera  10/11/24: C3 Lutathera    History of Present Illness:      45 y/o F PMH 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    - she hurt her shoulder recently but it is getting better with better range of motion  - last dose of Lutathera planned for 12/6  - denies any abdominal pain    Past Medical History:  Past Medical History:    Abdominal pain    Blood disorder    anemia    Blood in the stool    Cancer  (HCC)    pancreas    Constipation    Disorder of liver    elevated enzymes    Easy bruising    Exposure to medical diagnostic radiation    last tx 8/16/2024    Flatulence/gas pain/belching    Headache disorder    Heartburn    Heavy menses    Hemorrhoids    Indigestion    Irregular bowel habits    Itch of skin    Leg swelling    Menses painful    Nausea    Stool incontinence    Stress    Uncomfortable fullness after meals    Visual impairment    reading glasses    Vomiting    Wears glasses    Weight loss     Past Surgical History:   Procedure Laterality Date    Needle biopsy left      Tubal ligation         Home Medications:   HYDROcodone-acetaminophen 5-325 MG Oral Tab Take 1 tablet by mouth every 4 to 6 hours as needed for Pain. 90 tablet 0    lanreotide 120 MG/0.5ML Subcutaneous Solution Inject 120 mg into the skin every 28 days.      Vitamin C 500 MG Oral Tab Take 1 tablet (500 mg total) by mouth daily.      Multiple Vitamins-Minerals (MULTI-VITAMIN/MINERALS) Oral Tab Take 1 tablet by mouth daily.      Ursodiol 500 MG Oral Tab Take 1 tablet (500 mg total) by mouth in the morning and 1 tablet (500 mg total) before bedtime. 180 tablet 3       Allergies:   No Known Allergies    Psychosocial History:  Social History     Socioeconomic History    Marital status:      Spouse name: Not on file    Number of children: Not on file    Years of education: Not on file    Highest education level: Not on file   Occupational History    Not on file   Tobacco Use    Smoking status: Never    Smokeless tobacco: Never   Vaping Use    Vaping status: Never Used   Substance and Sexual Activity    Alcohol use: Not Currently    Drug use: Never    Sexual activity: Not on file   Other Topics Concern    Not on file   Social History Narrative    Not on file     Social Drivers of Health     Financial Resource Strain: Not on file   Food Insecurity: Food Insecurity Present (2/9/2024)    Received from [facility]  [facility]    Food Insecurity     Currently or in the past 3 months, have you worried your food would run out before you had money to buy more?: Yes     In the past 12 months, have you run out of food or been unable to get more?: No   Transportation Needs: No Transportation Needs (2/9/2024)    [facility], [facility]    Transportation Needs     Currently or in the past 3 months, has lack of transportation kept you from medical appointments, getting food or medicine, or providing care to a family member?: Not on file     : Not on file     Medical Transportation Needs?: No     Daily Living Transportation Needs? [Peds Only] : Not on file   Physical Activity: Not on file   Stress: Not on file (11/9/2024)   Social Connections: Unknown (3/13/2021)    [facility] from [facility], [facility]    Social Connections     Conversations with friends/family/neighbors per week: Not on file   Housing Stability: Low Risk  (11/10/2023)    [facility] from [facility], [facility]    Housing Stability     Mortgage Payment Concerns?: Not on file     Number of Places Lived in the Last Year: Not on file     Unstable Housing?: Not on file       Family Medical History:  No family history on file.    Review of Systems:  A 10-point ROS was done with pertinent positives and negative per the HPI    Vital Signs:  Height: 152.4 cm (5') (12/03 0928)  Weight: 53.1 kg (117 lb) (12/03 0928)  BSA (Calculated - sq m): 1.49 sq meters (12/03 0928)  Pulse: 97 (12/03 0928)  BP: 122/79 (12/03 0928)  Temp: 97.5 °F (36.4 °C) (12/03 0928)  Do Not Use - Resp Rate: --  SpO2: 98 % (12/03 0928)    Wt Readings from Last 6 Encounters:   12/03/24 53.1 kg (117 lb)   11/07/24 55.9 kg (123 lb 3.2 oz)   09/30/24 59 kg (130 lb)   10/08/24 54.3 kg (119 lb 12.8 oz)   09/17/24 59 kg (130 lb)   09/12/24 58.4 kg (128 lb 12.8 oz)      Physical Examination:  General: Patient is alert and oriented, not in acute distress  Psych: Mood and affect are appropriate  Eyes: EOMI, PERRL  ENT: Oropharynx is clear, no adenopathy  CV: no LE edema  Respiratory: non-labored respirations  GI/Abd: Soft, non-tender.   Neurological: Grossly intact   Lymphatics: No palpable inguinal lymphadenopathy  Skin: no rashes or petechiae    Laboratory:  Lab Results   Component Value Date    WBC 5.4 12/03/2024    WBC 5.6 11/20/2024    WBC 4.7 11/07/2024    HGB 10.9 (L) 12/03/2024    HGB 10.8 (L) 11/20/2024    HGB 10.2 (L) 11/07/2024    HCT 31.7 (L) 12/03/2024    MCV 85.0 12/03/2024    MCH 29.2 12/03/2024    MCHC 34.4 12/03/2024    RDW 14.2 12/03/2024    .0 (L) 12/03/2024    .0 (L) 11/20/2024    .0 (L) 11/07/2024     Lab Results   Component Value Date     (H) 11/20/2024    BUN 13 11/20/2024    CREATSERUM 0.56 11/20/2024    CREATSERUM 0.61 11/07/2024    CREATSERUM 0.49 (L) 09/24/2024    ANIONGAP 6 11/20/2024    CA 9.7 11/20/2024    OSMOCALC 282 11/20/2024    ALKPHO 362 (H) 11/20/2024    AST 72 (H) 11/20/2024    ALT 48 11/20/2024    BILT 0.5 11/20/2024    TP 8.7 (H) 11/20/2024    ALB 4.1 11/20/2024    GLOBULIN 4.6 (H) 11/20/2024     (L) 11/20/2024    K 3.6 11/20/2024     11/20/2024    CO2 26.0 11/20/2024     Lab Results   Component Value Date    INR 1.04 10/08/2024         Impression & Plan:     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, grade 2  - metastatic to liver and diffuse lymphadenopathy (ki-67 3%)  - s/p metal biliary stent placement 1/10/24  - baseline chromogranin A 229  - previously discussed incurable prognosis and palliative intent of treatment  - progressed on 1L single agent lanreotide  - received C1 Lutathera 6/21/24, C2 8/16/24, C3 10/11/24  - C4 Lutathera planned for 12/6/24; labs ok for C4. she can resume her monthly SSA at [facility] afterwards  - restaging dotatate PET to completed ~12/30      Infiltrative liver mass  - unclear etiology. Liver mass biopsy negative for malignancy. Continue to monitor on restaging scans    Thrombocytopenia  - secondary to lutathera. Mild. Can monitor for now    Cancer associated pain  - resolved    Iron deficiency anemia  - s/p IV iron dextran 2/14/24, IV infed 6/18/24    Primary biliary cholangitis  - on ursodiol; following with Dr Magdaleno    F/u: 1 month    Eduar Alfred  Hematology/Medical Oncology  [facility]

## 2024-12-05 ENCOUNTER — TELEPHONE (OUTPATIENT)
Dept: HEMATOLOGY/ONCOLOGY | Facility: HOSPITAL | Age: 44
End: 2024-12-05

## 2024-12-05 ENCOUNTER — LAB ENCOUNTER (OUTPATIENT)
Dept: LAB | Age: 44
End: 2024-12-05
Attending: PHYSICIAN ASSISTANT
Payer: COMMERCIAL

## 2024-12-05 DIAGNOSIS — Z51.81 ENCOUNTER FOR MEDICATION MONITORING: ICD-10-CM

## 2024-12-05 DIAGNOSIS — C7A.8 PRIMARY MALIGNANT NEUROENDOCRINE TUMOR OF PANCREAS (HCC): ICD-10-CM

## 2024-12-05 DIAGNOSIS — C7A.8 NEUROENDOCRINE CARCINOMA METASTATIC TO INTRA-ABDOMINAL LYMPH NODE (HCC): ICD-10-CM

## 2024-12-05 DIAGNOSIS — C7A.8 NEUROENDOCRINE CARCINOMA METASTATIC TO INTRA-ABDOMINAL LYMPH NODE (HCC): Primary | ICD-10-CM

## 2024-12-05 DIAGNOSIS — C7B.8 NEUROENDOCRINE CARCINOMA METASTATIC TO INTRA-ABDOMINAL LYMPH NODE (HCC): Primary | ICD-10-CM

## 2024-12-05 DIAGNOSIS — C7B.8 NEUROENDOCRINE CARCINOMA METASTATIC TO INTRA-ABDOMINAL LYMPH NODE (HCC): ICD-10-CM

## 2024-12-05 LAB
CHROMOGRANIN A: 136.8 NG/ML
HCG UR QL: NEGATIVE

## 2024-12-05 PROCEDURE — 81025 URINE PREGNANCY TEST: CPT

## 2024-12-05 NOTE — TELEPHONE ENCOUNTER
Informed patient that she complete her urine HCG test too early.  Per [employer] guideines, the urine HCG needs to be completed within 48 hours.  This is a very strict guideline.  Patient verbalized understanding and will go today.  (Did offer 7:15 am tomorrow morning, but prefers to complete today)

## 2024-12-06 ENCOUNTER — NURSE ONLY (OUTPATIENT)
Dept: HEMATOLOGY/ONCOLOGY | Facility: HOSPITAL | Age: 44
End: 2024-12-06
Attending: INTERNAL MEDICINE
Payer: COMMERCIAL

## 2024-12-06 ENCOUNTER — HOSPITAL ENCOUNTER (OUTPATIENT)
Dept: NUCLEAR MEDICINE | Facility: HOSPITAL | Age: 44
Discharge: HOME | End: 2024-12-06
Attending: PHYSICIAN ASSISTANT
Payer: COMMERCIAL

## 2024-12-06 VITALS
SYSTOLIC BLOOD PRESSURE: 115 MMHG | RESPIRATION RATE: 16 BRPM | OXYGEN SATURATION: 97 % | HEART RATE: 92 BPM | DIASTOLIC BLOOD PRESSURE: 81 MMHG | TEMPERATURE: 97 F

## 2024-12-06 DIAGNOSIS — C7A.8 NEUROENDOCRINE CARCINOMA METASTATIC TO INTRA-ABDOMINAL LYMPH NODE (HCC): ICD-10-CM

## 2024-12-06 DIAGNOSIS — C7B.8 NEUROENDOCRINE CARCINOMA METASTATIC TO INTRA-ABDOMINAL LYMPH NODE (HCC): ICD-10-CM

## 2024-12-06 DIAGNOSIS — C7B.8 METASTATIC MALIGNANT NEUROENDOCRINE TUMOR TO LIVER (HCC): Primary | ICD-10-CM

## 2024-12-06 DIAGNOSIS — D50.0 IRON DEFICIENCY ANEMIA DUE TO CHRONIC BLOOD LOSS: ICD-10-CM

## 2024-12-06 DIAGNOSIS — R11.2 NAUSEA AND VOMITING, UNSPECIFIED VOMITING TYPE: ICD-10-CM

## 2024-12-06 DIAGNOSIS — C7A.8 NEUROENDOCRINE CARCINOMA (HCC): ICD-10-CM

## 2024-12-06 DIAGNOSIS — C7A.8 PRIMARY MALIGNANT NEUROENDOCRINE TUMOR OF PANCREAS (HCC): ICD-10-CM

## 2024-12-06 PROCEDURE — 96375 TX/PRO/DX INJ NEW DRUG ADDON: CPT

## 2024-12-06 PROCEDURE — 96366 THER/PROPH/DIAG IV INF ADDON: CPT

## 2024-12-06 PROCEDURE — 96372 THER/PROPH/DIAG INJ SC/IM: CPT

## 2024-12-06 PROCEDURE — 96365 THER/PROPH/DIAG IV INF INIT: CPT

## 2024-12-06 PROCEDURE — 79101 NUCLEAR RX IV ADMIN: CPT | Performed by: PHYSICIAN ASSISTANT

## 2024-12-06 RX ORDER — ONDANSETRON 2 MG/ML
INJECTION INTRAMUSCULAR; INTRAVENOUS
Status: COMPLETED
Start: 2024-12-06 | End: 2024-12-06

## 2024-12-06 RX ORDER — PROCHLORPERAZINE MALEATE 10 MG
TABLET ORAL
Status: COMPLETED
Start: 2024-12-06 | End: 2024-12-06

## 2024-12-06 RX ORDER — OCTREOTIDE ACETATE 30 MG
KIT INTRAMUSCULAR
Status: COMPLETED
Start: 2024-12-06 | End: 2024-12-06

## 2024-12-06 RX ORDER — DEXAMETHASONE SODIUM PHOSPHATE 4 MG/ML
8 VIAL (ML) INJECTION ONCE
Status: CANCELLED
Start: 2025-01-03 | End: 2025-01-03

## 2024-12-06 RX ORDER — OCTREOTIDE ACETATE 30 MG
30 KIT INTRAMUSCULAR ONCE
Status: COMPLETED | OUTPATIENT
Start: 2024-12-06 | End: 2024-12-06

## 2024-12-06 RX ORDER — DEXAMETHASONE SODIUM PHOSPHATE 10 MG/ML
10 INJECTION, SOLUTION INTRAMUSCULAR; INTRAVENOUS ONCE
Status: DISCONTINUED | OUTPATIENT
Start: 2024-12-06 | End: 2024-12-06

## 2024-12-06 RX ORDER — DEXAMETHASONE SODIUM PHOSPHATE 10 MG/ML
INJECTION, SOLUTION INTRAMUSCULAR; INTRAVENOUS
Status: COMPLETED
Start: 2024-12-06 | End: 2024-12-06

## 2024-12-06 RX ORDER — ONDANSETRON 2 MG/ML
8 INJECTION INTRAMUSCULAR; INTRAVENOUS ONCE
Status: COMPLETED | OUTPATIENT
Start: 2024-12-06 | End: 2024-12-06

## 2024-12-06 RX ORDER — PROCHLORPERAZINE MALEATE 10 MG
10 TABLET ORAL ONCE
Status: COMPLETED | OUTPATIENT
Start: 2024-12-06 | End: 2024-12-06

## 2024-12-06 RX ORDER — OCTREOTIDE ACETATE 30 MG
30 KIT INTRAMUSCULAR ONCE
Status: CANCELLED | OUTPATIENT
Start: 2024-12-20

## 2024-12-06 RX ORDER — DEXAMETHASONE SODIUM PHOSPHATE 4 MG/ML
8 VIAL (ML) INJECTION ONCE
Status: DISCONTINUED | OUTPATIENT
Start: 2024-12-06 | End: 2024-12-06

## 2024-12-06 RX ORDER — DEXAMETHASONE SODIUM PHOSPHATE 4 MG/ML
8 VIAL (ML) INJECTION ONCE
Status: CANCELLED
Start: 2024-12-06 | End: 2024-12-06

## 2024-12-06 RX ORDER — ARGININE/LYSINE/STERILE WATER 25-25MG/ML
1000 PLASTIC BAG, INJECTION (ML) INTRAVENOUS ONCE
Status: COMPLETED | OUTPATIENT
Start: 2024-12-06 | End: 2024-12-06

## 2024-12-06 RX ORDER — DEXAMETHASONE SODIUM PHOSPHATE 10 MG/ML
8 INJECTION, SOLUTION INTRAMUSCULAR; INTRAVENOUS ONCE
Status: COMPLETED | OUTPATIENT
Start: 2024-12-06 | End: 2024-12-06

## 2024-12-06 RX ORDER — ONDANSETRON 2 MG/ML
8 INJECTION INTRAMUSCULAR; INTRAVENOUS ONCE
Status: CANCELLED | OUTPATIENT
Start: 2024-12-20

## 2024-12-06 RX ORDER — ARGININE/LYSINE/STERILE WATER 25-25MG/ML
1000 PLASTIC BAG, INJECTION (ML) INTRAVENOUS ONCE
Status: CANCELLED | OUTPATIENT
Start: 2024-12-20

## 2024-12-06 RX ORDER — OCTREOTIDE ACETATE 500 UG/ML
300 INJECTION, SOLUTION INTRAVENOUS; SUBCUTANEOUS ONCE
OUTPATIENT
Start: 2024-12-20

## 2024-12-06 RX ORDER — DEXAMETHASONE SODIUM PHOSPHATE 10 MG/ML
10 INJECTION, SOLUTION INTRAMUSCULAR; INTRAVENOUS ONCE
OUTPATIENT
Start: 2024-12-20

## 2024-12-06 RX ORDER — PROCHLORPERAZINE MALEATE 10 MG
10 TABLET ORAL ONCE
OUTPATIENT
Start: 2024-12-20

## 2024-12-06 RX ADMIN — ARGININE/LYSINE/STERILE WATER 1000 ML: 25-25MG/ML PLASTIC BAG, INJECTION (ML) INTRAVENOUS at 08:35:00

## 2024-12-06 RX ADMIN — PROCHLORPERAZINE MALEATE 10 MG: 10 MG TABLET ORAL at 10:55:00

## 2024-12-06 RX ADMIN — ONDANSETRON 8 MG: 2 INJECTION INTRAMUSCULAR; INTRAVENOUS at 08:10:00

## 2024-12-06 RX ADMIN — OCTREOTIDE ACETATE 30 MG: 30 MG KIT INTRAMUSCULAR at 13:40:00

## 2024-12-06 RX ADMIN — DEXAMETHASONE SODIUM PHOSPHATE 8 MG: 10 INJECTION, SOLUTION INTRAMUSCULAR; INTRAVENOUS at 13:45:00

## 2024-12-06 NOTE — PROGRESS NOTES
Pt to infusion center for Lutathera treatment 4/4     Antiemetic administered and pt transported to [facility] by staff member via wheelchair.     Amino acid infusion started 30 minutes prior to Lutathera and continued for three hours after its completion.      Pt encouraged to hydrate and urinate frequently.  Amino acid infusion tolerated w/o adverse reaction.    1050  Pt reports feeling nauseous and dizzy  VSS , compazine PO given, crackers as well    Nausea resolved     1300 pt complain of nausea emesis x 1   8 mg IV Dex given per order and HEMAL Pulido    Nausea resolved prior to discharge       Sandostatin LAR injection given four hours post Lutathera.  Discharged home via wheelchair.    Education Record    Learner:  Patient  Barriers / Limitations:  None  Method:  Discussion and Reinforcement  General Topics:  Procedure, radiation safety, discharge instructions   Comments: Education provided by [facility] physician  Outcome:  Shows understanding

## 2024-12-10 DIAGNOSIS — G89.3 NEOPLASM RELATED PAIN (ACUTE) (CHRONIC): ICD-10-CM

## 2024-12-10 RX ORDER — HYDROCODONE BITARTRATE AND ACETAMINOPHEN 5; 325 MG/1; MG/1
1 TABLET ORAL
Qty: 90 TABLET | Refills: 0 | Status: SHIPPED | OUTPATIENT
Start: 2024-12-10 | End: 2025-01-03

## 2024-12-18 ENCOUNTER — HOSPITAL ENCOUNTER (OUTPATIENT)
Dept: NUCLEAR MEDICINE | Facility: HOSPITAL | Age: 44
Discharge: HOME | End: 2024-12-18
Attending: INTERNAL MEDICINE
Payer: COMMERCIAL

## 2024-12-18 DIAGNOSIS — C7A.8 PRIMARY MALIGNANT NEUROENDOCRINE TUMOR OF PANCREAS (HCC): ICD-10-CM

## 2024-12-18 DIAGNOSIS — C7B.8 METASTATIC MALIGNANT NEUROENDOCRINE TUMOR TO LIVER (HCC): ICD-10-CM

## 2024-12-18 DIAGNOSIS — C7A.8 NEUROENDOCRINE CARCINOMA METASTATIC TO INTRA-ABDOMINAL LYMPH NODE (HCC): ICD-10-CM

## 2024-12-18 DIAGNOSIS — C7B.8 NEUROENDOCRINE CARCINOMA METASTATIC TO INTRA-ABDOMINAL LYMPH NODE (HCC): ICD-10-CM

## 2024-12-18 PROCEDURE — 78815 PET IMAGE W/CT SKULL-THIGH: CPT | Performed by: INTERNAL MEDICINE

## 2025-01-02 RX ORDER — LANREOTIDE ACETATE 120 MG/.5ML
120 INJECTION SUBCUTANEOUS ONCE
Status: CANCELLED | OUTPATIENT
Start: 2025-01-02

## 2025-01-02 RX ORDER — LANREOTIDE ACETATE 120 MG/.5ML
120 INJECTION SUBCUTANEOUS ONCE
OUTPATIENT
Start: 2025-01-31

## 2025-01-03 ENCOUNTER — OFFICE VISIT (OUTPATIENT)
Age: 45
End: 2025-01-03
Attending: INTERNAL MEDICINE
Payer: COMMERCIAL

## 2025-01-03 VITALS
OXYGEN SATURATION: 93 % | HEIGHT: 60 IN | SYSTOLIC BLOOD PRESSURE: 111 MMHG | HEART RATE: 100 BPM | WEIGHT: 109.5 LBS | BODY MASS INDEX: 21.5 KG/M2 | DIASTOLIC BLOOD PRESSURE: 74 MMHG | RESPIRATION RATE: 16 BRPM | TEMPERATURE: 97 F

## 2025-01-03 DIAGNOSIS — R53.83 FATIGUE DUE TO TREATMENT: ICD-10-CM

## 2025-01-03 DIAGNOSIS — C7A.8 PRIMARY MALIGNANT NEUROENDOCRINE TUMOR OF PANCREAS (HCC): Primary | ICD-10-CM

## 2025-01-03 DIAGNOSIS — K59.03 DRUG-INDUCED CONSTIPATION: ICD-10-CM

## 2025-01-03 DIAGNOSIS — C7B.8 METASTATIC MALIGNANT NEUROENDOCRINE TUMOR TO LIVER (HCC): ICD-10-CM

## 2025-01-03 DIAGNOSIS — Z51.5 PALLIATIVE CARE BY SPECIALIST: ICD-10-CM

## 2025-01-03 DIAGNOSIS — C7B.8 METASTATIC MALIGNANT NEUROENDOCRINE TUMOR TO LIVER (HCC): Primary | ICD-10-CM

## 2025-01-03 DIAGNOSIS — R63.0 LACK OF APPETITE: ICD-10-CM

## 2025-01-03 DIAGNOSIS — G89.3 NEOPLASM RELATED PAIN: Primary | ICD-10-CM

## 2025-01-03 DIAGNOSIS — G47.9 SLEEPING DIFFICULTY: ICD-10-CM

## 2025-01-03 LAB
ALBUMIN SERPL-MCNC: 4 G/DL (ref 3.2–4.8)
ALBUMIN/GLOB SERPL: 1.1 {RATIO} (ref 1–2)
ALP LIVER SERPL-CCNC: 1066 U/L
ALT SERPL-CCNC: 120 U/L
ANION GAP SERPL CALC-SCNC: 5 MMOL/L (ref 0–18)
AST SERPL-CCNC: 134 U/L (ref ?–34)
BASOPHILS # BLD AUTO: 0.03 X10(3) UL (ref 0–0.2)
BASOPHILS NFR BLD AUTO: 0.5 %
BILIRUB SERPL-MCNC: 1.1 MG/DL (ref 0.3–1.2)
BUN BLD-MCNC: 16 MG/DL (ref 9–23)
CALCIUM BLD-MCNC: 11.7 MG/DL (ref 8.7–10.4)
CHLORIDE SERPL-SCNC: 102 MMOL/L (ref 98–112)
CO2 SERPL-SCNC: 28 MMOL/L (ref 21–32)
CREAT BLD-MCNC: 0.76 MG/DL
EGFRCR SERPLBLD CKD-EPI 2021: 98 ML/MIN/1.73M2 (ref 60–?)
EOSINOPHIL # BLD AUTO: 0.05 X10(3) UL (ref 0–0.7)
EOSINOPHIL NFR BLD AUTO: 0.9 %
ERYTHROCYTE [DISTWIDTH] IN BLOOD BY AUTOMATED COUNT: 15.2 %
GLOBULIN PLAS-MCNC: 3.8 G/DL (ref 2–3.5)
GLUCOSE BLD-MCNC: 264 MG/DL (ref 70–99)
HCT VFR BLD AUTO: 29.5 %
HGB BLD-MCNC: 9.9 G/DL
IMM GRANULOCYTES # BLD AUTO: 0.08 X10(3) UL (ref 0–1)
IMM GRANULOCYTES NFR BLD: 1.4 %
LYMPHOCYTES # BLD AUTO: 0.23 X10(3) UL (ref 1–4)
LYMPHOCYTES NFR BLD AUTO: 4 %
MCH RBC QN AUTO: 29.5 PG (ref 26–34)
MCHC RBC AUTO-ENTMCNC: 33.6 G/DL (ref 31–37)
MCV RBC AUTO: 87.8 FL
MONOCYTES # BLD AUTO: 0.35 X10(3) UL (ref 0.1–1)
MONOCYTES NFR BLD AUTO: 6.2 %
NEUTROPHILS # BLD AUTO: 4.95 X10 (3) UL (ref 1.5–7.7)
NEUTROPHILS # BLD AUTO: 4.95 X10(3) UL (ref 1.5–7.7)
NEUTROPHILS NFR BLD AUTO: 87 %
OSMOLALITY SERPL CALC.SUM OF ELEC: 290 MOSM/KG (ref 275–295)
PLATELET # BLD AUTO: 88 10(3)UL (ref 150–450)
PLATELETS.RETICULATED NFR BLD AUTO: 2.9 % (ref 0–7)
POTASSIUM SERPL-SCNC: 3.5 MMOL/L (ref 3.5–5.1)
PROT SERPL-MCNC: 7.8 G/DL (ref 5.7–8.2)
RBC # BLD AUTO: 3.36 X10(6)UL
SODIUM SERPL-SCNC: 135 MMOL/L (ref 136–145)
WBC # BLD AUTO: 5.7 X10(3) UL (ref 4–11)

## 2025-01-03 RX ORDER — OXYCODONE HYDROCHLORIDE 5 MG/1
5 TABLET ORAL EVERY 4 HOURS PRN
Qty: 60 TABLET | Refills: 0 | Status: SHIPPED | OUTPATIENT
Start: 2025-01-03

## 2025-01-03 RX ORDER — GABAPENTIN 300 MG/1
300 CAPSULE ORAL NIGHTLY
Qty: 30 CAPSULE | Refills: 1 | Status: SHIPPED | OUTPATIENT
Start: 2025-01-03

## 2025-01-03 RX ORDER — LANREOTIDE ACETATE 120 MG/.5ML
120 INJECTION SUBCUTANEOUS ONCE
Status: COMPLETED | OUTPATIENT
Start: 2025-01-03 | End: 2025-01-03

## 2025-01-03 RX ADMIN — LANREOTIDE ACETATE 120 MG: 120 INJECTION SUBCUTANEOUS at 10:36:00

## 2025-01-03 NOTE — PROGRESS NOTES
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 Path of pancreatic head mass and periportal lymph node both consistent with well differentiated, grade 2 neuroendocrine tumor, Ki-67 3%  1/31/24: Dotatate PET with avid pancreatic head mass, diffuse lymph node metastases of left supraclavicular region, mediastinum, hilum, retroperitoneum and mesentery  2/14/24: C1 lanreotide  3/13/24: C2 lanreotide  4/12/24: C3 lanreotide  4/23/24: s/p liver biopsy with Dr Magdaleno, path positive for primary biliary cholangitis  5/9/24: C4 lanreotide  5/17/24: CT enterography with disease progression in pancreas, abdominal/retroperitoneal adenopathy  6/21/24: C1 Lutathera  8/16/24: C2 Lutathera  10/11/24: C3 Lutathera  12/6/24: C4 Lutathera  12/18/24: Dotatate PET with disease progression in neck, C/A/P and bones     History of Present Illness:      45 y/o F PMH 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    - she has not started cape/tem  - having more abdominal pain; reports Norco not working for her  - feels like she is getting weaker, with worse appetite as well. She notes she has lost 8  lbs    Past Medical History:  Past Medical History:    Abdominal pain    Blood disorder    anemia    Blood in the stool    Cancer (HCC)    pancreas    Constipation    Disorder of liver    elevated enzymes    Easy bruising    Exposure to medical diagnostic radiation    last tx 8/16/2024    Flatulence/gas pain/belching    Headache disorder    Heartburn    Heavy menses    Hemorrhoids    Indigestion    Irregular bowel habits    Itch of skin    Leg swelling    Menses painful    Nausea    Nausea & vomiting    Stool incontinence    Stress    Uncomfortable fullness after meals    Visual impairment    reading glasses    Vomiting    Wears glasses    Weight loss     Past Surgical History:   Procedure Laterality Date    Needle biopsy left      Tubal ligation         Home Medications:   HYDROcodone-acetaminophen 5-325 MG Oral Tab Take 1 tablet by mouth every 4 to 6 hours as needed for Pain. 90 tablet 0    lanreotide 120 MG/0.5ML Subcutaneous Solution Inject 120 mg into the skin every 28 days.      Vitamin C 500 MG Oral Tab Take 1 tablet (500 mg total) by mouth daily.      Multiple Vitamins-Minerals (MULTI-VITAMIN/MINERALS) Oral Tab Take 1 tablet by mouth daily.         Allergies:   No Known Allergies    Psychosocial History:  Social History     Socioeconomic History    Marital status:      Spouse name: Not on file    Number of children: Not on file    Years of education: Not on file    Highest education level: Not on file   Occupational History    Not on file   Tobacco Use    Smoking status: Never    Smokeless tobacco: Never   Vaping Use    Vaping status: Never Used   Substance and Sexual Activity    Alcohol use: Not Currently    Drug use: Never    Sexual activity: Not on file   Other Topics Concern    Not on file   Social History Narrative    Not on file     Social Drivers of Health     Financial Resource Strain: Not on file   Food Insecurity: Food Insecurity Present (2/9/2024)    Received from [facility]  [place], [facility]    Food Insecurity     Currently or in the past 3 months, have you worried your food would run out before you had money to buy more?: Yes     In the past 12 months, have you run out of food or been unable to get more?: No   Transportation Needs: No Transportation Needs (2/9/2024)    Received from [facility], [facility]    Transportation Needs     Currently or in the past 3 months, has lack of transportation kept you from medical appointments, getting food or medicine, or providing care to a family member?: Unrecognized value     : Not on file     Medical Transportation Needs?: No     Daily Living Transportation Needs? [Peds Only] : Not on file   Physical Activity: Not on file   Stress: Not on file (11/9/2024)   Social Connections: Unknown (3/13/2021)    Received from [facility], [facility]    Social Connections     Conversations with friends/family/neighbors per week: Not on file   Housing Stability: Low Risk  (11/10/2023)    Received from [facility], [facility]    Housing Stability     Mortgage Payment Concerns?: Not on file     Number of Places Lived in the Last Year: Not on file     Unstable Housing?: Not on file       Family Medical History:  History reviewed. No pertinent family history.    Review of Systems:  A 10-point ROS was done with pertinent positives and negative per the HPI    Vital Signs:  Height: 152.4 cm (5') (01/03 0918)  Weight: 49.7 kg (109 lb 8 oz) (01/03 0918)  BSA (Calculated - sq m): 1.45 sq meters (01/03 0918)  Pulse: 100 (01/03 0918)  BP: 111/74 (01/03 0918)  Temp: 97 °F (36.1 °C) (01/03 0918)  Do Not Use - Resp Rate: --  SpO2: 93 % (01/03 0918)    Wt Readings from Last 6 Encounters:   01/03/25 49.7 kg (109 lb 8 oz)   12/03/24 53.1 kg (117 lb)   11/07/24 55.9 kg (123 lb 3.2 oz)   09/30/24 59 kg (130 lb)   10/08/24 54.3 kg (119 lb 12.8  oz)   09/17/24 59 kg (130 lb)     Physical Examination:  General: Patient is alert and oriented, not in acute distress  Psych: Mood and affect are appropriate  Eyes: EOMI, PERRL  ENT: Oropharynx is clear, no adenopathy  CV: no LE edema  Respiratory: non-labored respirations  GI/Abd: Soft, non-tender.   Neurological: Grossly intact   Skin: no rashes or petechiae    Laboratory:  Lab Results   Component Value Date    WBC 5.7 01/03/2025    WBC 5.4 12/03/2024    WBC 5.6 11/20/2024    HGB 9.9 (L) 01/03/2025    HGB 10.9 (L) 12/03/2024    HGB 10.8 (L) 11/20/2024    HCT 29.5 (L) 01/03/2025    MCV 87.8 01/03/2025    MCH 29.5 01/03/2025    MCHC 33.6 01/03/2025    RDW 15.2 01/03/2025    PLT 88.0 (L) 01/03/2025    .0 (L) 12/03/2024    .0 (L) 11/20/2024     Lab Results   Component Value Date     (H) 01/03/2025    BUN 16 01/03/2025    CREATSERUM 0.76 01/03/2025    CREATSERUM 0.84 12/03/2024    CREATSERUM 0.56 11/20/2024    ANIONGAP 5 01/03/2025    CA 11.7 (H) 01/03/2025    OSMOCALC 290 01/03/2025    ALKPHO 1,066 (H) 01/03/2025     (H) 01/03/2025     (H) 01/03/2025    BILT 1.1 01/03/2025    TP 7.8 01/03/2025    ALB 4.0 01/03/2025    GLOBULIN 3.8 (H) 01/03/2025     (L) 01/03/2025    K 3.5 01/03/2025     01/03/2025    CO2 28.0 01/03/2025     Lab Results   Component Value Date    INR 1.04 10/08/2024         Impression & Plan:     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, grade 2  - metastatic to liver and diffuse lymphadenopathy (ki-67 3%)  - s/p metal biliary stent placement 1/10/24  - baseline chromogranin A 229  - previously discussed incurable prognosis and palliative intent of treatment  - progressed on 1L single agent lanreotide  - refractory to Lutathera; CT reviewed with worsening metastatic disease  - discussed switch to capecitabine/temozolomide. We discussed logistics of treatment and side effects including but not limited to HFS, low blood counts, n/v.  She needs a chemo ed with  before she starts  - [id number] follow up while on cape/tem    Elevated LFTs  - secondary to worsening metastatic disease.     Thrombocytopenia  - secondary to lutathera and worsening hepatic disease    Cancer associated pain  - resolved    Iron deficiency anemia  - s/p IV iron dextran 2/14/24, IV infed 6/18/24    Primary biliary cholangitis  - on ursodiol; following with Dr Magdaleno    F/u: chemo ed.     Eduar Alfred  Hematology/Medical Oncology  [facility]

## 2025-01-03 NOTE — PROGRESS NOTES
Patient here for follow-up and planned injection. Had recent PET 12/18/24. Has not started tem/cape. States she would like to discuss with doctor first. Having increased right sided abdominal pain. Using norco without much relief.

## 2025-01-03 NOTE — CONSULTS
Palliative Care Consult Note     Patient Name: Lluvia Nieves   YOB: 1980   Medical Record Number: [id number]   [facility]: [id number]   Date of visit: 1/3/2025     Reason for Consult: Referred by Dr. Alfred for Symptom management     Chief Complaint/Reason for Visit:  Initial visit for symptoms     History of Present Illness:         Lluvia Nieves is a 44 year old female with metastatic neuroendocrine receiving chemotherapy.  She complains of abdominal and low back pain that is constant but varies in intensity.  The pain is most intense with position changes.  The pain does wake her.  The pain can be deep achy or sharp burning pain.   She is using norco with some benefit but it also doesn't last until next dose.  She is waiting until pain is severe to use it since it contributes to constipation.  The pain is affecting activity and fatigue.  She has no appetite and tries to eat protein and supplement when needed.  She is MOM every 2-3 days with benefit for constipation.  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)    Primary malignant neuroendocrine tumor of pancreas (HCC)    Iron deficiency anemia due to chronic blood loss    Nausea & vomiting    Palliative care by specialist      Medical History:  Past Medical History:    Abdominal pain    Blood disorder    anemia    Blood in the stool    Cancer (HCC)    pancreas    Constipation    Disorder of liver    elevated enzymes    Easy bruising    Exposure to medical diagnostic radiation    last tx 8/16/2024    Flatulence/gas pain/belching    Headache disorder    Heartburn    Heavy menses    Hemorrhoids    Indigestion    Irregular bowel habits    Itch of skin    Leg swelling    Menses painful     Nausea    Nausea & vomiting    Stool incontinence    Stress    Uncomfortable fullness after meals    Visual impairment    reading glasses    Vomiting    Wears glasses    Weight loss     Surgical History:  Past Surgical History:   Procedure Laterality Date    Needle biopsy left      Tubal ligation         Allergies:  Allergies[1]    Palliative Care Social History:    Marital Status: Henry  Children: 9yr old daughter  Living Situation: home with family      Medications:  Current Outpatient Medications   Medication Sig Dispense Refill    oxyCODONE 5 MG Oral Tab Take 1 tablet (5 mg total) by mouth every 4 (four) hours as needed for Pain. 60 tablet 0    gabapentin 300 MG Oral Cap Take 1 capsule (300 mg total) by mouth nightly. 30 capsule 1    capecitabine 500 MG Oral tab Take 2 tablets (1,000 mg total) by mouth 2 (two) times daily Days 1-14 of a 28 day cycle. (Patient not taking: Reported on 1/3/2025) 56 tablet 11    temozolomide 100 MG Oral capsule Take 3 capsules (300 mg total) by mouth daily Days 10-14 of a 28 day cycle. (Patient not taking: Reported on 1/3/2025) 15 capsule 11    HYDROcodone-acetaminophen 5-325 MG Oral Tab Take 1 tablet by mouth every 4 to 6 hours as needed for Pain. 90 tablet 0    lanreotide 120 MG/0.5ML Subcutaneous Solution Inject 120 mg into the skin every 28 days.      Vitamin C 500 MG Oral Tab Take 1 tablet (500 mg total) by mouth daily.      Multiple Vitamins-Minerals (MULTI-VITAMIN/MINERALS) Oral Tab Take 1 tablet by mouth daily.      prochlorperazine (COMPAZINE) 10 mg tablet Take 1 tablet (10 mg total) by mouth every 6 (six) hours as needed for Nausea. (Patient not taking: Reported on 1/3/2025) 60 tablet 1    Ursodiol 500 MG Oral Tab Take 1 tablet (500 mg total) by mouth in the morning and 1 tablet (500 mg total) before bedtime. (Patient not taking: Reported on 1/3/2025) 180 tablet 3       Review of Systems:  General:  Fatigue.  Feels well.    Respiratory:  Denies SOB, denies  cough  Cardiac:  Denies chest pain, heart palpitations  Abdomen:  Denies constipation, diarrhea.  Denies pain.    Psych:  No complaints.  Sleeping well    Palliative Performance Scale:  90%    Physical Examination:  General: Patient is alert and oriented, not in acute distress.  Respiratory: Clear to auscultation.  Cardiac: Regular rate and rhythm.  No edema  Abdomen: Soft, non tender with good bowel sounds.  Musculoskeletal: Normal gait. Walks without assistive device.  Psych:  Mood/Affect appropriate      Advanced Directives Discussed and Completed:     HCPOA/Health Surrogate:    There is no completed HCPOA documentation on file in Epic.    POLST Discussed and Completed:   pain was focus    Palliative Care:  patient would like pain better controlled.  Will switch to oxycodone to allow more flexibility with dosing and minimize tylenol use.    She has some neuropathic like pain so will add gabapentin.  Patient will start new treatment so can consider weaning if pain improves.    Discussed strategies to improve appetite and oral intake.   Discussed bowel prophylaxis.      Impression/Plan:   1. Neoplasm related pain  Oxycodone 5mg Q 4 prn  Gabapentin 300mg Q HS    2. Drug-induced constipation  Miralax daily  MOM daily prn    3. Lack of appetite  Small frequent meals  Protein supplements    4. Sleeping difficulty  Sleep hygiene  Pain control    5. Fatigue due to treatment  Increase activity with moderation  Sunlight  Limit naps  Pain control    6. Palliative care by specialist  Ongoing support    7. Metastatic malignant neuroendocrine tumor to liver (HCC)      Planned Follow up: Return in about 1 week (around 1/10/2025).    I spent a total of  45 minutes with the patient today, which included all of the following:direct face to face contact, history taking, physical examination, and >50% was spent counseling and coordinating care         Electronically Signed by:  JAMAR KOROMA   [facility] Outpatient Palliative  Nurse Practitioner            [1] No Known Allergies

## 2025-01-03 NOTE — PROGRESS NOTES
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

## 2025-01-06 LAB — CHROMOGRANIN A: 214.1 NG/ML

## 2025-01-09 ENCOUNTER — TELEPHONE (OUTPATIENT)
Age: 45
End: 2025-01-09

## 2025-01-09 NOTE — TELEPHONE ENCOUNTER
Called to find out what the status is of the capecitabine/temozolomide prescriptions that were transferred weeks ago. I re-sent the prescriptions yesterday to try to get the ball rolling. They said they have received the prescriptions and are not ready to schedule yet. They will call the patient when ready to schedule.

## 2025-01-13 ENCOUNTER — TELEPHONE (OUTPATIENT)
Age: 45
End: 2025-01-13

## 2025-01-13 NOTE — TELEPHONE ENCOUNTER
Toxicities: Somatuline [place] on 1/3/2025  She has not started taking Capecitabine/Temozolomide yet.    Altered Mental Status/Pain     Lluvia's sister Michelle reports Lluvia has been confused since yesterday. She is not eat more than 1-2 bites of food. She is sipping fluids. She is having \"terrible pain\" in her neck, back, left leg and right arm even though she is taking oxycodone 5 mg every 4 hours. She is also taking gabapentin 300 mg at bedtime. She is currently rating her pain \"9/10.\" Today Lluvia can barely walk with a stand by assist and walker.     I told Michelle Teixeira needs to be evaluated in the  ER. Michelle said she won't be able to get her into the car due to her pain. I told her they need to call 911 now.  She agreed with the plan and will call for an ambulance now.

## 2025-01-13 NOTE — TELEPHONE ENCOUNTER
Pt's spouse Henry called and advised the pt is having a lot of pain in her bones on her neck, back, left leg and rt arm. He reports pt can't walk and can't lay on bed by herself. Pt isn't eating much gets full quickly. He states the pt is on pain medication but it isn't working     Please give a call back [phone] this is the second number to call if they do not answer the number selected

## 2025-01-14 ENCOUNTER — TELEPHONE (OUTPATIENT)
Age: 45
End: 2025-01-14

## 2025-01-14 NOTE — TELEPHONE ENCOUNTER
Called [facility]. Confirmed prescriptions are covered by her plan. For some reason they are not processing yet. They will \"expedite\" this per the agent I spoke with.

## 2025-01-31 ENCOUNTER — APPOINTMENT (OUTPATIENT)
Age: 45
End: 2025-01-31
Attending: INTERNAL MEDICINE
Payer: COMMERCIAL

## (undated) DEVICE — 10FT COMBINED O2 DELIVERY/CO2 MONITORING. FILTER WITH MICROSTREAM TYPE LUER: Brand: DUAL ADULT NASAL CANNULA

## (undated) DEVICE — KIT CUSTOM ENDOPROCEDURE STERIS

## (undated) DEVICE — REM POLYHESIVE ADULT PATIENT RETURN ELECTRODE: Brand: VALLEYLAB

## (undated) DEVICE — CANNULATING SPHINCTEROTOME: Brand: AUTOTOME™ RX 44

## (undated) DEVICE — BITEBLOCK ENDOSCP 60FR MAXI STRP

## (undated) DEVICE — 1200CC GUARDIAN II: Brand: GUARDIAN

## (undated) DEVICE — SNAPLOC WIRE GUIDE LOCKING DEVICE: Brand: SNAPLOC

## (undated) DEVICE — Device

## (undated) DEVICE — ACROBAT 2 CALIBRATED TIP WIRE GUIDE: Brand: ACROBAT

## (undated) DEVICE — 3M™ RED DOT™ MONITORING ELECTRODE WITH FOAM TAPE AND STICKY GEL 2570-3, 3/BAG, 200/CASE, 54/PLT: Brand: RED DOT™

## (undated) DEVICE — RETRIEVAL BALLOON CATHETER: Brand: EXTRACTOR™ PRO RX

## (undated) DEVICE — 3M™ RED DOT™ MONITORING ELECTRODE WITH FOAM TAPE AND STICKY GEL, 50/BAG, 20/CASE, 72/PLT 2570: Brand: RED DOT™

## (undated) DEVICE — SINGLE USE DISTAL COVER MAJ-2315: Brand: SINGLE USE DISTAL COVER

## (undated) DEVICE — FUSION OMNI-TOME SPHINCTEROTOME: Brand: FUSION

## (undated) DEVICE — SYRINGE MED 10ML LL TIP W/O SFTY DISP

## (undated) DEVICE — KIT VLV 5 PC AIR H2O SUCT BX ENDOGATOR CONN

## (undated) DEVICE — GIJAW SINGLE-USE BIOPSY FORCEPS WITH NEEDLE: Brand: GIJAW

## (undated) DEVICE — STERIS KITS

## (undated) DEVICE — BILIARY STENT WITH NAVIFLEXTM RX DELIVERY SYSTEM
Type: IMPLANTABLE DEVICE | Status: NON-FUNCTIONAL
Brand: ADVANIX™ BILIARY

## (undated) DEVICE — ENDOSCOPIC ULTRASOUND FINE NEEDLE BIOPSY (FNB) DEVICE: Brand: ACQUIRE

## (undated) DEVICE — BALLOON HEMOSTATIC EUS LINEAR

## (undated) DEVICE — KIT MFLD FOR SPEC COLL

## (undated) NOTE — LETTER
To: Dr. Alfred  Patient Name: Lluvia iNeves-Age / Sex: 1980-A: 44 y  female   Medical Records: [id number] [facility]: [id number]    Request for History & Physical for Radiology Procedure at [facility]    The above patient is scheduled to have a procedure performed in Radiology.  In order for the procedure to be performed safely, a comprehensive History & Physical, to include the Review of Systems, is required within 30 days of the scheduled appointment.      Procedure:  CT Biopsy Liver  Date Scheduled: 10/9/2024  Ordered by (Ordering Physician):  Dr. Alfred    Please [place] the [place] to [facility] at [phone].  For Questions: Call [facility] [phone]    If you cannot provide us with a comprehensive History & Physical prior to this appointment, you will need to cancel and reschedule the appointment by calling Central Scheduling [phone].  Thank you.

## (undated) NOTE — LETTER
To: Dr Ordonez,  Patient Name: Lluvia Nieves-Age / Sex: 1980-A: 43 y  female   Medical Records: [id number] [facility]: [id number]    Request for History & Physical for Radiology Procedure at [facility]    The above patient is scheduled to have a procedure performed in Radiology.  In order for the procedure to be performed safely, a comprehensive History & Physical, to include the Review of Systems, is required within 30 days of the scheduled appointment.      Procedure:    Date Scheduled:   Ordered by (Ordering Physician):  Anesthesia    Please FAX the completed H&P to [facility] at [phone].  For Questions: Call [facility] [phone]    If you cannot provide us with a comprehensive History & Physical prior to this appointment, you will need to cancel and reschedule the appointment by calling Central Scheduling [phone].  Thank you.